# Patient Record
Sex: FEMALE | Race: WHITE | NOT HISPANIC OR LATINO | Employment: UNEMPLOYED | URBAN - NONMETROPOLITAN AREA
[De-identification: names, ages, dates, MRNs, and addresses within clinical notes are randomized per-mention and may not be internally consistent; named-entity substitution may affect disease eponyms.]

---

## 2017-02-11 ENCOUNTER — HOSPITAL ENCOUNTER (EMERGENCY)
Facility: HOSPITAL | Age: 27
Discharge: HOME | End: 2017-02-12
Attending: EMERGENCY MEDICINE | Admitting: EMERGENCY MEDICINE

## 2017-02-11 DIAGNOSIS — B34.9 VIRAL SYNDROME: ICD-10-CM

## 2017-02-11 DIAGNOSIS — R09.82 POST-NASAL DRIP: Primary | ICD-10-CM

## 2017-02-11 DIAGNOSIS — R11.0 NAUSEA: ICD-10-CM

## 2017-02-11 LAB
FLUAV AG NPH QL: NEGATIVE
FLUBV AG NPH QL IA: NEGATIVE
S PYO AG THROAT QL: NEGATIVE

## 2017-02-11 PROCEDURE — 99283 EMERGENCY DEPT VISIT LOW MDM: CPT

## 2017-02-11 PROCEDURE — 87081 CULTURE SCREEN ONLY: CPT | Performed by: EMERGENCY MEDICINE

## 2017-02-11 PROCEDURE — 87880 STREP A ASSAY W/OPTIC: CPT | Performed by: EMERGENCY MEDICINE

## 2017-02-11 PROCEDURE — 94770: CPT

## 2017-02-11 PROCEDURE — 87804 INFLUENZA ASSAY W/OPTIC: CPT | Performed by: EMERGENCY MEDICINE

## 2017-02-12 VITALS
TEMPERATURE: 98.7 F | RESPIRATION RATE: 20 BRPM | HEART RATE: 92 BPM | WEIGHT: 220 LBS | DIASTOLIC BLOOD PRESSURE: 91 MMHG | SYSTOLIC BLOOD PRESSURE: 140 MMHG | OXYGEN SATURATION: 97 % | BODY MASS INDEX: 38.98 KG/M2 | HEIGHT: 63 IN

## 2017-02-12 RX ORDER — AMOXICILLIN 500 MG/1
1000 CAPSULE ORAL 3 TIMES DAILY
Qty: 30 CAPSULE | Refills: 0 | Status: SHIPPED | OUTPATIENT
Start: 2017-02-12 | End: 2017-06-20

## 2017-02-12 RX ORDER — ONDANSETRON 4 MG/1
4 TABLET, ORALLY DISINTEGRATING ORAL EVERY 8 HOURS PRN
Qty: 6 TABLET | Refills: 0 | Status: SHIPPED | OUTPATIENT
Start: 2017-02-12 | End: 2017-06-20

## 2017-02-12 NOTE — ED PROVIDER NOTES
Subjective   HPI Comments: Patient said there has been coughing with upset stomach for about a week denies any fever, no nausea vomiting or diarrhea.    No shortness of breath.    Surgical history history of only left ankle surgery.    Denies any allergies or take any medication in process.    Past medical history denies any other medical problem except for obesity.    Family history of hypertension diabetes cancer and heart disease.     smoke about a pack a day    Patient is a 26 y.o. female presenting with cough.   History provided by:  Patient  Cough   Cough characteristics:  Dry and non-productive  Severity:  Mild  Onset quality:  Gradual  Duration:  7 days  Timing:  Constant  Chronicity:  New  Smoker: yes    Relieved by:  Nothing  Worsened by:  Nothing  Ineffective treatments:  None tried  Associated symptoms: sinus congestion    Associated symptoms: no chest pain, no eye discharge, no fever, no headaches, no rash, no sore throat and no wheezing        Review of Systems   Constitutional: Negative for activity change, appetite change, fatigue and fever.   HENT: Negative for congestion, facial swelling, mouth sores, nosebleeds, sore throat and trouble swallowing.    Eyes: Negative for discharge, redness and itching.   Respiratory: Positive for cough. Negative for apnea and wheezing.    Cardiovascular: Negative for chest pain and palpitations.   Gastrointestinal: Negative for blood in stool and nausea.   Endocrine: Negative for cold intolerance, heat intolerance, polydipsia, polyphagia and polyuria.   Genitourinary: Negative for difficulty urinating, dysuria, flank pain, frequency and hematuria.   Musculoskeletal: Negative for gait problem, joint swelling and neck pain.   Skin: Negative.  Negative for color change, pallor and rash.   Allergic/Immunologic: Negative for environmental allergies.   Neurological: Negative for dizziness, seizures, syncope, speech difficulty, light-headedness, numbness and headaches.  "  Hematological: Negative for adenopathy.   Psychiatric/Behavioral: Negative for agitation, behavioral problems, confusion and sleep disturbance. The patient is not nervous/anxious.        Past Medical History   Diagnosis Date   • Asthma        No Known Allergies    Past Surgical History   Procedure Laterality Date   • Ankle surgery Left 2010       History reviewed. No pertinent family history.    Social History     Social History   • Marital status: Single     Spouse name: N/A   • Number of children: N/A   • Years of education: N/A     Social History Main Topics   • Smoking status: Current Every Day Smoker     Packs/day: 1.00     Types: Cigarettes   • Smokeless tobacco: None   • Alcohol use None   • Drug use: None   • Sexual activity: Not Asked     Other Topics Concern   • None     Social History Narrative   • None           Objective   Physical Exam   Constitutional: She is oriented to person, place, and time. She appears well-developed and well-nourished.   HENT:   Head: Normocephalic and atraumatic.   Nose: Nose normal.   Posterior pharynx with postnasal drip   Eyes: Conjunctivae and EOM are normal. Pupils are equal, round, and reactive to light.   Neck: Normal range of motion. Neck supple.   Cardiovascular: Normal rate, regular rhythm, normal heart sounds and intact distal pulses.    Pulmonary/Chest: Effort normal and breath sounds normal.   Abdominal: Soft. Bowel sounds are normal.   Musculoskeletal: Normal range of motion.   Neurological: She is alert and oriented to person, place, and time.   Skin: Skin is warm and dry.   Psychiatric: She has a normal mood and affect. Her behavior is normal. Judgment and thought content normal.   Nursing note and vitals reviewed.      Procedures         ED Course  ED Course      Blood pressure 140/91, pulse 92, temperature 98.7 °F (37.1 °C), temperature source Temporal Artery , resp. rate 20, height 63\" (160 cm), weight 220 lb (99.8 kg), SpO2 97 %.    Labs Reviewed "   INFLUENZA ANTIGEN - Normal   RAPID STREP A SCREEN - Normal   BETA HEMOLYTIC STREP CULTURE, THROAT        No orders to display                 MDM    Final diagnoses:   Post-nasal drip   Viral syndrome   Nausea            Vipul Phan MD  02/12/17 [id number]

## 2017-02-14 LAB — BACTERIA SPEC AEROBE CULT: NORMAL

## 2017-02-17 ENCOUNTER — HOSPITAL ENCOUNTER (EMERGENCY)
Facility: HOSPITAL | Age: 27
Discharge: LEFT BEFORE DISCHARGE | End: 2017-02-17

## 2017-02-17 VITALS
TEMPERATURE: 98.2 F | DIASTOLIC BLOOD PRESSURE: 90 MMHG | HEART RATE: 102 BPM | HEIGHT: 63 IN | RESPIRATION RATE: 20 BRPM | BODY MASS INDEX: 35.44 KG/M2 | WEIGHT: 200 LBS | OXYGEN SATURATION: 97 % | SYSTOLIC BLOOD PRESSURE: 127 MMHG

## 2017-02-17 PROCEDURE — 99211 OFF/OP EST MAY X REQ PHY/QHP: CPT

## 2017-06-20 ENCOUNTER — OFFICE VISIT (OUTPATIENT)
Dept: OBSTETRICS AND GYNECOLOGY | Facility: CLINIC | Age: 27
End: 2017-06-20

## 2017-06-20 ENCOUNTER — APPOINTMENT (OUTPATIENT)
Dept: LAB | Facility: HOSPITAL | Age: 27
End: 2017-06-20

## 2017-06-20 VITALS
WEIGHT: 227 LBS | BODY MASS INDEX: 40.22 KG/M2 | DIASTOLIC BLOOD PRESSURE: 72 MMHG | SYSTOLIC BLOOD PRESSURE: 128 MMHG | HEIGHT: 63 IN

## 2017-06-20 DIAGNOSIS — Z67.91 BLOOD TYPE, RH NEGATIVE: ICD-10-CM

## 2017-06-20 DIAGNOSIS — N97.9 FEMALE INFERTILITY: Primary | ICD-10-CM

## 2017-06-20 LAB — BLD GP AB SCN SERPL QL: NEGATIVE

## 2017-06-20 PROCEDURE — 99213 OFFICE O/P EST LOW 20 MIN: CPT | Performed by: OBSTETRICS & GYNECOLOGY

## 2017-06-20 PROCEDURE — 86850 RBC ANTIBODY SCREEN: CPT | Performed by: OBSTETRICS & GYNECOLOGY

## 2017-06-20 PROCEDURE — 36415 COLL VENOUS BLD VENIPUNCTURE: CPT | Performed by: OBSTETRICS & GYNECOLOGY

## 2017-06-20 NOTE — PROGRESS NOTES
Subjective   Veena Buck is a 26 y.o. female.     HPI Comments: Patient presents today to discuss infertility.  Reports 2 previous pregnancies which ended in early first trimester miscarriage.  She reports that her blood type is O- and worries about how antibodies may effect her pregnnacies.  We discussed potential workup including antibody screening.  LMP: 3 days ago, prior was in Mar  Last pregnancy was Aug 2016  G1 was as a teenager.  Has been attempting pregnancy for the past 1 1/2 - 2 years.  Discussed irregular periods and weight with potential effects.  Periods were regular for the past 4 years until Mar.      Infertility         The following portions of the patient's history were reviewed and updated as appropriate: allergies, current medications, past family history, past medical history, past social history, past surgical history and problem list.      Review of Systems   All other systems reviewed and are negative.      Objective   Physical Exam   Constitutional: She is oriented to person, place, and time. She appears well-developed and well-nourished. No distress.   HENT:   Head: Normocephalic and atraumatic.   Right Ear: External ear normal.   Left Ear: External ear normal.   Nose: Nose normal.   Mouth/Throat: Oropharynx is clear and moist.   Neurological: She is alert and oriented to person, place, and time.   Skin: She is not diaphoretic.   Psychiatric: She has a normal mood and affect. Her behavior is normal. Judgment and thought content normal.   Vitals reviewed.      Assessment/Plan   Veena was seen today for infertility.    Diagnoses and all orders for this visit:    Female infertility    Blood type, Rh negative  -     Antibody Screen       Antibody screen  Weight loss with 10 lb weight loss goal over 2 mo and f/u 2 mo  Home ovulation kits and keep track of periods

## 2017-06-30 ENCOUNTER — OFFICE VISIT (OUTPATIENT)
Dept: FAMILY MEDICINE CLINIC | Facility: CLINIC | Age: 27
End: 2017-06-30

## 2017-06-30 ENCOUNTER — APPOINTMENT (OUTPATIENT)
Dept: LAB | Facility: HOSPITAL | Age: 27
End: 2017-06-30

## 2017-06-30 VITALS
DIASTOLIC BLOOD PRESSURE: 84 MMHG | WEIGHT: 223.8 LBS | BODY MASS INDEX: 39.65 KG/M2 | SYSTOLIC BLOOD PRESSURE: 132 MMHG | HEIGHT: 63 IN

## 2017-06-30 DIAGNOSIS — N97.9 FEMALE INFERTILITY: Chronic | ICD-10-CM

## 2017-06-30 DIAGNOSIS — Z72.0 TOBACCO USE: Primary | ICD-10-CM

## 2017-06-30 DIAGNOSIS — Z87.09 HISTORY OF ASTHMA: ICD-10-CM

## 2017-06-30 DIAGNOSIS — R73.9 HYPERGLYCEMIA: ICD-10-CM

## 2017-06-30 LAB
ALBUMIN SERPL-MCNC: 4.4 G/DL (ref 3.4–4.8)
ALBUMIN/GLOB SERPL: 1.4 G/DL (ref 1.1–1.8)
ALP SERPL-CCNC: 112 U/L (ref 38–126)
ALT SERPL W P-5'-P-CCNC: 73 U/L (ref 9–52)
ANION GAP SERPL CALCULATED.3IONS-SCNC: 14 MMOL/L (ref 5–15)
AST SERPL-CCNC: 60 U/L (ref 14–36)
BILIRUB SERPL-MCNC: 0.7 MG/DL (ref 0.2–1.3)
BUN BLD-MCNC: 5 MG/DL (ref 7–21)
BUN/CREAT SERPL: 8.3 (ref 7–25)
CALCIUM SPEC-SCNC: 9.8 MG/DL (ref 8.4–10.2)
CHLORIDE SERPL-SCNC: 97 MMOL/L (ref 95–110)
CHOLEST SERPL-MCNC: 231 MG/DL (ref 0–199)
CO2 SERPL-SCNC: 28 MMOL/L (ref 22–31)
CREAT BLD-MCNC: 0.6 MG/DL (ref 0.5–1)
GFR SERPL CREATININE-BSD FRML MDRD: 121 ML/MIN/1.73 (ref 71–165)
GLOBULIN UR ELPH-MCNC: 3.2 GM/DL (ref 2.3–3.5)
GLUCOSE BLD-MCNC: 277 MG/DL (ref 60–100)
HBA1C MFR BLD: 10.35 % (ref 4–5.6)
HDLC SERPL-MCNC: 36 MG/DL (ref 60–200)
LDLC SERPL CALC-MCNC: 117 MG/DL (ref 0–129)
LDLC/HDLC SERPL: 3.26 {RATIO} (ref 0–3.22)
MAGNESIUM SERPL-MCNC: 1.7 MG/DL (ref 1.6–2.3)
POTASSIUM BLD-SCNC: 4.3 MMOL/L (ref 3.5–5.1)
PROT SERPL-MCNC: 7.6 G/DL (ref 6.3–8.6)
SODIUM BLD-SCNC: 139 MMOL/L (ref 137–145)
T4 FREE SERPL-MCNC: 0.98 NG/DL (ref 0.78–2.19)
TRIGL SERPL-MCNC: 388 MG/DL (ref 20–199)
TSH SERPL DL<=0.05 MIU/L-ACNC: 1.68 MIU/ML (ref 0.46–4.68)
VLDLC SERPL-MCNC: 77.6 MG/DL

## 2017-06-30 PROCEDURE — 83525 ASSAY OF INSULIN: CPT | Performed by: FAMILY MEDICINE

## 2017-06-30 PROCEDURE — 83036 HEMOGLOBIN GLYCOSYLATED A1C: CPT | Performed by: FAMILY MEDICINE

## 2017-06-30 PROCEDURE — 83735 ASSAY OF MAGNESIUM: CPT | Performed by: FAMILY MEDICINE

## 2017-06-30 PROCEDURE — 84443 ASSAY THYROID STIM HORMONE: CPT | Performed by: FAMILY MEDICINE

## 2017-06-30 PROCEDURE — 99214 OFFICE O/P EST MOD 30 MIN: CPT | Performed by: FAMILY MEDICINE

## 2017-06-30 PROCEDURE — 80053 COMPREHEN METABOLIC PANEL: CPT | Performed by: FAMILY MEDICINE

## 2017-06-30 PROCEDURE — 36415 COLL VENOUS BLD VENIPUNCTURE: CPT | Performed by: FAMILY MEDICINE

## 2017-06-30 PROCEDURE — 84439 ASSAY OF FREE THYROXINE: CPT | Performed by: FAMILY MEDICINE

## 2017-06-30 PROCEDURE — 80061 LIPID PANEL: CPT | Performed by: FAMILY MEDICINE

## 2017-06-30 RX ORDER — ALBUTEROL SULFATE 90 UG/1
2 AEROSOL, METERED RESPIRATORY (INHALATION) EVERY 4 HOURS PRN
Qty: 1 INHALER | Refills: 2 | Status: SHIPPED | OUTPATIENT
Start: 2017-06-30 | End: 2017-11-15 | Stop reason: SDUPTHER

## 2017-06-30 NOTE — PROGRESS NOTES
Subjective   Veena Buck is a 27 y.o. female.     History of Present Illness Ressie routine evaluation.  Currently having problems with infertility weight.    Probable polycystic ovary syndrome.  Has seen gynecology.  In the past has had an elevated blood sugar 2.  Continues to smoke unfortunately.  Has some family history of metabolic syndrome.  Currently also is in need of an inhaler.  History of asthma but continues to smoke.  History noted.    The following portions of the patient's history were reviewed and updated as appropriate: allergies, current medications, past family history, past medical history, past social history, past surgical history and problem list.    Review of Systems   Constitutional: Negative for activity change, appetite change, fatigue and unexpected weight change.   HENT: Negative for trouble swallowing and voice change.    Eyes: Negative for redness and visual disturbance.   Respiratory: Negative for cough and wheezing.    Cardiovascular: Negative for chest pain and palpitations.   Gastrointestinal: Negative for abdominal pain, constipation, diarrhea, nausea and vomiting.   Genitourinary: Negative for urgency.   Musculoskeletal: Negative for joint swelling.   Neurological: Negative for syncope and headaches.   Hematological: Negative for adenopathy.   Psychiatric/Behavioral: Negative for sleep disturbance.       Objective   Physical Exam   Constitutional: She is oriented to person, place, and time.   HENT:   Head: Normocephalic.   Right Ear: External ear normal.   Left Ear: External ear normal.   Nose: Nose normal.   Mouth/Throat: Oropharynx is clear and moist.   Eyes: EOM are normal. Pupils are equal, round, and reactive to light.   Neck: Normal range of motion. Neck supple. No tracheal deviation present. No thyromegaly present.   Cardiovascular: Normal rate, regular rhythm, normal heart sounds and intact distal pulses.    Pulmonary/Chest: Effort normal and breath sounds normal.  No respiratory distress. She has no wheezes. She has no rales. She exhibits no tenderness.   Abdominal: Soft. Bowel sounds are normal. She exhibits no distension. There is no tenderness.   Musculoskeletal: Normal range of motion.   Neurological: She is alert and oriented to person, place, and time. She has normal reflexes.   Skin: Skin is warm and dry.   Psychiatric: She has a normal mood and affect. Her behavior is normal. Judgment and thought content normal.       Assessment/Plan   Problems Addressed this Visit        Genitourinary    Female infertility (Chronic)    Relevant Orders    Comprehensive Metabolic Panel    Magnesium    T4, Free    TSH    Lipid Panel With LDL / HDL Ratio    Insulin, Random       Other    Tobacco use - Primary (Chronic)    Relevant Medications    albuterol (PROVENTIL HFA;VENTOLIN HFA) 108 (90 BASE) MCG/ACT inhaler    BMI 40.0-44.9, adult (Chronic)    Relevant Orders    Comprehensive Metabolic Panel    Magnesium    T4, Free    TSH    Hemoglobin A1c    Lipid Panel With LDL / HDL Ratio    Insulin, Random    History of asthma    Relevant Medications    albuterol (PROVENTIL HFA;VENTOLIN HFA) 108 (90 BASE) MCG/ACT inhaler      Other Visit Diagnoses     Hyperglycemia        Relevant Orders    Comprehensive Metabolic Panel    Magnesium    Hemoglobin A1c    Lipid Panel With LDL / HDL Ratio    Insulin, Random           lifestyle changes will be due [id number].Recheckasdirectedin7-10days

## 2017-07-03 LAB — INSULIN SERPL-ACNC: 14 UIU/ML

## 2017-07-07 ENCOUNTER — OFFICE VISIT (OUTPATIENT)
Dept: FAMILY MEDICINE CLINIC | Facility: CLINIC | Age: 27
End: 2017-07-07

## 2017-07-07 ENCOUNTER — OFFICE VISIT (OUTPATIENT)
Dept: ENDOCRINOLOGY | Facility: CLINIC | Age: 27
End: 2017-07-07

## 2017-07-07 VITALS
WEIGHT: 221 LBS | HEART RATE: 95 BPM | HEIGHT: 63 IN | SYSTOLIC BLOOD PRESSURE: 122 MMHG | DIASTOLIC BLOOD PRESSURE: 80 MMHG | OXYGEN SATURATION: 96 % | BODY MASS INDEX: 39.16 KG/M2

## 2017-07-07 DIAGNOSIS — E11.9 TYPE 2 DIABETES MELLITUS WITHOUT COMPLICATION, WITHOUT LONG-TERM CURRENT USE OF INSULIN (HCC): Primary | ICD-10-CM

## 2017-07-07 PROCEDURE — 99213 OFFICE O/P EST LOW 20 MIN: CPT | Performed by: FAMILY MEDICINE

## 2017-07-07 RX ORDER — METFORMIN HYDROCHLORIDE 500 MG/1
500 TABLET, EXTENDED RELEASE ORAL
Qty: 90 TABLET | Refills: 3 | Status: SHIPPED | OUTPATIENT
Start: 2017-07-07 | End: 2017-11-15 | Stop reason: SDUPTHER

## 2017-07-07 NOTE — PROGRESS NOTES
Subjective   Veena Buck is a 27 y.o. female.     History of Present Illness reevaluation following studies.  Blood sugar elevated to 77.  Counseled on pathophysiology of type 2 diabetes metabolic syndrome with insulin resistance.  Have  lifestyle measures as before.  Start metformin diabetic educator as outlined.  Counseled flu vaccine in the fall.  Recheck 4 months with hemoglobin A1c prior    The following portions of the patient's history were reviewed and updated as appropriate: allergies, current medications, past family history, past medical history, past social history, past surgical history and problem list.    Review of Systems   Constitutional: Negative for activity change, appetite change, fatigue and unexpected weight change.   HENT: Negative for trouble swallowing and voice change.    Eyes: Negative for redness and visual disturbance.   Respiratory: Negative for cough and wheezing.    Cardiovascular: Negative for chest pain and palpitations.   Gastrointestinal: Negative for abdominal pain, constipation, diarrhea, nausea and vomiting.   Genitourinary: Negative for urgency.   Musculoskeletal: Negative for joint swelling.   Neurological: Negative for syncope and headaches.   Hematological: Negative for adenopathy.   Psychiatric/Behavioral: Negative for sleep disturbance.       Objective   Physical Exam   HENT:   Head: Normocephalic.   Eyes: Pupils are equal, round, and reactive to light.   Neck: Normal range of motion.   Skin: Skin is warm.   Psychiatric: She has a normal mood and affect. Her behavior is normal.       Assessment/Plan   Veena was seen today for follow-up.    Diagnoses and all orders for this visit:    Type 2 diabetes mellitus without complication, without long-term current use of insulin  -     Ambulatory Referral to Diabetic Education  -     metFORMIN ER (GLUCOPHAGE-XR) 500 MG 24 hr tablet; Take 1 tablet by mouth Daily With Breakfast. For blood sugar everyday  -      Hemoglobin A1c; Future    BMI 40.0-44.9, adult       Plan as above

## 2017-11-14 ENCOUNTER — LAB (OUTPATIENT)
Dept: LAB | Facility: HOSPITAL | Age: 27
End: 2017-11-14

## 2017-11-14 DIAGNOSIS — E11.9 TYPE 2 DIABETES MELLITUS WITHOUT COMPLICATION, WITHOUT LONG-TERM CURRENT USE OF INSULIN (HCC): ICD-10-CM

## 2017-11-14 LAB — HBA1C MFR BLD: 8.7 % (ref 4–5.6)

## 2017-11-14 PROCEDURE — 36415 COLL VENOUS BLD VENIPUNCTURE: CPT

## 2017-11-14 PROCEDURE — 83036 HEMOGLOBIN GLYCOSYLATED A1C: CPT | Performed by: FAMILY MEDICINE

## 2017-11-15 ENCOUNTER — OFFICE VISIT (OUTPATIENT)
Dept: FAMILY MEDICINE CLINIC | Facility: CLINIC | Age: 27
End: 2017-11-15

## 2017-11-15 VITALS
WEIGHT: 220.7 LBS | BODY MASS INDEX: 39.11 KG/M2 | DIASTOLIC BLOOD PRESSURE: 78 MMHG | HEIGHT: 63 IN | SYSTOLIC BLOOD PRESSURE: 120 MMHG

## 2017-11-15 DIAGNOSIS — E11.9 TYPE 2 DIABETES MELLITUS WITHOUT COMPLICATION, WITHOUT LONG-TERM CURRENT USE OF INSULIN (HCC): Primary | ICD-10-CM

## 2017-11-15 DIAGNOSIS — Z87.09 HISTORY OF ASTHMA: ICD-10-CM

## 2017-11-15 DIAGNOSIS — Z72.0 TOBACCO USE: Chronic | ICD-10-CM

## 2017-11-15 PROCEDURE — 99214 OFFICE O/P EST MOD 30 MIN: CPT | Performed by: FAMILY MEDICINE

## 2017-11-15 RX ORDER — ALBUTEROL SULFATE 90 UG/1
2 AEROSOL, METERED RESPIRATORY (INHALATION) EVERY 4 HOURS PRN
Qty: 1 INHALER | Refills: 2 | Status: SHIPPED | OUTPATIENT
Start: 2017-11-15 | End: 2018-03-15 | Stop reason: SDUPTHER

## 2017-11-15 RX ORDER — METFORMIN HYDROCHLORIDE EXTENDED-RELEASE TABLETS 1000 MG/1
TABLET, FILM COATED, EXTENDED RELEASE ORAL
Qty: 90 TABLET | Refills: 3 | Status: SHIPPED | OUTPATIENT
Start: 2017-11-15 | End: 2017-12-11

## 2017-11-15 NOTE — PROGRESS NOTES
Subjective   Veena Buck is a 27 y.o. female.     History of Present Illness   reevaluation new-onset diabetes tobacco abuse obesity.  In the interim hemoglobin A1c is dropped from 10-8.7.  Would increase her metformin 2000 mg a day.  Trying to watch diet lost little weight.  Still smoking unfortunately however declines flu vaccine.  Overall metabolically improved.  History noted.    The following portions of the patient's history were reviewed and updated as appropriate: allergies, current medications, past family history, past medical history, past social history, past surgical history and problem list.    Review of Systems   Constitutional: Negative for activity change, appetite change, fatigue and unexpected weight change.   HENT: Negative for trouble swallowing and voice change.    Eyes: Negative for redness and visual disturbance.   Respiratory: Negative for cough and wheezing.    Cardiovascular: Negative for chest pain and palpitations.   Gastrointestinal: Negative for abdominal pain, constipation, diarrhea, nausea and vomiting.   Genitourinary: Negative for urgency.   Musculoskeletal: Negative for joint swelling.   Neurological: Negative for syncope and headaches.   Hematological: Negative for adenopathy.   Psychiatric/Behavioral: Negative for sleep disturbance.       Objective   Physical Exam   Constitutional: She is oriented to person, place, and time. She appears well-developed.   HENT:   Head: Normocephalic.   Nose: Nose normal.   Eyes: Pupils are equal, round, and reactive to light.   Neck: Normal range of motion. No thyromegaly present.   Cardiovascular: Normal rate, regular rhythm, normal heart sounds and intact distal pulses.  Exam reveals no gallop and no friction rub.    No murmur heard.  Pulmonary/Chest: Breath sounds normal.   Abdominal: Soft. She exhibits no distension and no mass. There is no tenderness.   Musculoskeletal: Normal range of motion.   Neurological: She is alert and  oriented to person, place, and time. She has normal reflexes.   Skin: Skin is warm and dry.   Psychiatric: She has a normal mood and affect. Her behavior is normal. Judgment and thought content normal.       Assessment/Plan   Problems Addressed this Visit        Digestive    BMI 40.0-44.9, adult (Chronic)       Endocrine    Type 2 diabetes mellitus without complication, without long-term current use of insulin - Primary    Relevant Medications    metFORMIN ER (FORTAMET) 1000 MG (OSM) 24 hr tablet    Other Relevant Orders    Hemoglobin A1c       Other    Tobacco use (Chronic)    Relevant Medications    albuterol (PROVENTIL HFA;VENTOLIN HFA) 108 (90 Base) MCG/ACT inhaler    History of asthma    Relevant Medications    albuterol (PROVENTIL HFA;VENTOLIN HFA) 108 (90 Base) MCG/ACT inhaler         on stopping smoking.  3-10m      on wt loss measures and programs  Declines flu vaccine just was made above recheck lab 4 months

## 2017-12-11 RX ORDER — METFORMIN HYDROCHLORIDE 750 MG/1
750 TABLET, EXTENDED RELEASE ORAL
Qty: 90 TABLET | Refills: 3 | Status: SHIPPED | OUTPATIENT
Start: 2017-12-11 | End: 2018-03-15 | Stop reason: SDUPTHER

## 2018-03-13 ENCOUNTER — TELEPHONE (OUTPATIENT)
Dept: FAMILY MEDICINE CLINIC | Facility: CLINIC | Age: 28
End: 2018-03-13

## 2018-03-13 NOTE — TELEPHONE ENCOUNTER
Called patient to ask her to go to the lab but there was no answer. ----- Message from Jr Prieto MD sent at 3/13/2018 10:17 AM CDT -----  Regarding: thurs appt  To lab before thurs

## 2018-03-15 ENCOUNTER — APPOINTMENT (OUTPATIENT)
Dept: LAB | Facility: HOSPITAL | Age: 28
End: 2018-03-15

## 2018-03-15 ENCOUNTER — OFFICE VISIT (OUTPATIENT)
Dept: FAMILY MEDICINE CLINIC | Facility: CLINIC | Age: 28
End: 2018-03-15

## 2018-03-15 VITALS
BODY MASS INDEX: 37.49 KG/M2 | DIASTOLIC BLOOD PRESSURE: 82 MMHG | WEIGHT: 211.6 LBS | HEIGHT: 63 IN | SYSTOLIC BLOOD PRESSURE: 130 MMHG

## 2018-03-15 DIAGNOSIS — Z87.09 HISTORY OF ASTHMA: ICD-10-CM

## 2018-03-15 DIAGNOSIS — Z72.0 TOBACCO USE: Chronic | ICD-10-CM

## 2018-03-15 DIAGNOSIS — E11.9 TYPE 2 DIABETES MELLITUS WITHOUT COMPLICATION, WITHOUT LONG-TERM CURRENT USE OF INSULIN (HCC): Primary | ICD-10-CM

## 2018-03-15 LAB — HBA1C MFR BLD: 10.4 % (ref 4–5.6)

## 2018-03-15 PROCEDURE — 83036 HEMOGLOBIN GLYCOSYLATED A1C: CPT | Performed by: FAMILY MEDICINE

## 2018-03-15 PROCEDURE — 36415 COLL VENOUS BLD VENIPUNCTURE: CPT | Performed by: FAMILY MEDICINE

## 2018-03-15 PROCEDURE — 99214 OFFICE O/P EST MOD 30 MIN: CPT | Performed by: FAMILY MEDICINE

## 2018-03-15 RX ORDER — METFORMIN HYDROCHLORIDE 750 MG/1
750 TABLET, EXTENDED RELEASE ORAL
Qty: 90 TABLET | Refills: 3 | Status: SHIPPED | OUTPATIENT
Start: 2018-03-15 | End: 2019-04-03 | Stop reason: SDUPTHER

## 2018-03-15 RX ORDER — ALBUTEROL SULFATE 90 UG/1
2 AEROSOL, METERED RESPIRATORY (INHALATION) EVERY 4 HOURS PRN
Qty: 1 INHALER | Refills: 12 | Status: SHIPPED | OUTPATIENT
Start: 2018-03-15 | End: 2019-04-03 | Stop reason: SDUPTHER

## 2018-03-15 NOTE — PROGRESS NOTES
Subjective   Veena Buck is a 27 y.o. female.     History of Present Illness   follow-up diabetes obesity tobacco abuse.  In interim for some reason confusion about medications try and take it twice daily was taking once a day.  States causes upset stomach counseled on same.  I did not get hemoglobin A1c range for same today.  Counseled once again mainly on lifestyle measures today including diet exercise salt restriction etc.  Also counseled again on stopping smoking.    The following portions of the patient's history were reviewed and updated as appropriate: allergies, current medications, past family history, past medical history, past social history, past surgical history and problem list.    Review of Systems   Constitutional: Negative for activity change, appetite change, fatigue and unexpected weight change.   HENT: Negative for trouble swallowing and voice change.    Eyes: Negative for redness and visual disturbance.   Respiratory: Negative for cough and wheezing.    Cardiovascular: Negative for chest pain and palpitations.   Gastrointestinal: Negative for abdominal pain, constipation, diarrhea, nausea and vomiting.   Genitourinary: Negative for urgency.   Musculoskeletal: Negative for joint swelling.   Neurological: Negative for syncope and headaches.   Hematological: Negative for adenopathy.   Psychiatric/Behavioral: Negative for sleep disturbance.       Objective   Physical Exam   Constitutional: She is oriented to person, place, and time. She appears well-developed.   HENT:   Head: Normocephalic.   Nose: Nose normal.   Eyes: Pupils are equal, round, and reactive to light.   Neck: Normal range of motion. No thyromegaly present.   Cardiovascular: Normal rate, regular rhythm, normal heart sounds and intact distal pulses.  Exam reveals no gallop and no friction rub.    No murmur heard.  Pulmonary/Chest: Breath sounds normal.   Abdominal: Soft. She exhibits no distension and no mass. There is no  tenderness.   Musculoskeletal: Normal range of motion.   Neurological: She is alert and oriented to person, place, and time. She has normal reflexes.   Skin: Skin is warm and dry.   Psychiatric: She has a normal mood and affect.       Assessment/Plan   Veena was seen today for diabetes.    Diagnoses and all orders for this visit:    Type 2 diabetes mellitus without complication, without long-term current use of insulin  -     Hemoglobin A1c  -     metFORMIN ER (GLUCOPHAGE-XR) 750 MG 24 hr tablet; Take 1 tablet by mouth Daily With Breakfast.    BMI 40.0-44.9, adult    History of asthma  -     albuterol (PROVENTIL HFA;VENTOLIN HFA) 108 (90 Base) MCG/ACT inhaler; Inhale 2 puffs Every 4 (Four) Hours As Needed for Wheezing.    Tobacco use  -     albuterol (PROVENTIL HFA;VENTOLIN HFA) 108 (90 Base) MCG/ACT inhaler; Inhale 2 puffs Every 4 (Four) Hours As Needed for Wheezing.    Body mass index is 37.48 kg/m².   on wt loss measures and programs   on stopping smoking.  3-10m     Adjustments recheck 4 months

## 2018-06-26 ENCOUNTER — HOSPITAL ENCOUNTER (EMERGENCY)
Facility: HOSPITAL | Age: 28
Discharge: HOME | End: 2018-06-26
Attending: FAMILY MEDICINE | Admitting: FAMILY MEDICINE

## 2018-06-26 VITALS
WEIGHT: 211 LBS | SYSTOLIC BLOOD PRESSURE: 142 MMHG | DIASTOLIC BLOOD PRESSURE: 90 MMHG | OXYGEN SATURATION: 98 % | TEMPERATURE: 99.1 F | RESPIRATION RATE: 18 BRPM | BODY MASS INDEX: 37.39 KG/M2 | HEIGHT: 63 IN | HEART RATE: 110 BPM

## 2018-06-26 DIAGNOSIS — Z34.91 FIRST TRIMESTER PREGNANCY: Primary | ICD-10-CM

## 2018-06-26 LAB
ALBUMIN SERPL-MCNC: 4.4 G/DL (ref 3.4–4.8)
ALBUMIN/GLOB SERPL: 1.5 G/DL (ref 1.1–1.8)
ALP SERPL-CCNC: 91 U/L (ref 38–126)
ALT SERPL W P-5'-P-CCNC: 58 U/L (ref 9–52)
ANION GAP SERPL CALCULATED.3IONS-SCNC: 11 MMOL/L (ref 5–15)
AST SERPL-CCNC: 42 U/L (ref 14–36)
BACTERIA UR QL AUTO: ABNORMAL /HPF
BASOPHILS # BLD AUTO: 0.02 10*3/MM3 (ref 0–0.2)
BASOPHILS NFR BLD AUTO: 0.2 % (ref 0–2)
BILIRUB SERPL-MCNC: 0.4 MG/DL (ref 0.2–1.3)
BILIRUB UR QL STRIP: NEGATIVE
BUN BLD-MCNC: 6 MG/DL (ref 7–21)
BUN/CREAT SERPL: 10 (ref 7–25)
CALCIUM SPEC-SCNC: 9.9 MG/DL (ref 8.4–10.2)
CHLORIDE SERPL-SCNC: 97 MMOL/L (ref 95–110)
CLARITY UR: ABNORMAL
CO2 SERPL-SCNC: 28 MMOL/L (ref 22–31)
COLOR UR: YELLOW
CREAT BLD-MCNC: 0.6 MG/DL (ref 0.5–1)
DEPRECATED RDW RBC AUTO: 39.2 FL (ref 36.4–46.3)
EOSINOPHIL # BLD AUTO: 0.2 10*3/MM3 (ref 0–0.7)
EOSINOPHIL NFR BLD AUTO: 1.9 % (ref 0–7)
ERYTHROCYTE [DISTWIDTH] IN BLOOD BY AUTOMATED COUNT: 12.6 % (ref 11.5–14.5)
GFR SERPL CREATININE-BSD FRML MDRD: 120 ML/MIN/1.73 (ref 71–165)
GLOBULIN UR ELPH-MCNC: 3 GM/DL (ref 2.3–3.5)
GLUCOSE BLD-MCNC: 309 MG/DL (ref 60–100)
GLUCOSE UR STRIP-MCNC: ABNORMAL MG/DL
HCG INTACT+B SERPL-ACNC: 296.18 MIU/ML
HCG SERPL QL: POSITIVE
HCT VFR BLD AUTO: 42.7 % (ref 35–45)
HGB BLD-MCNC: 15 G/DL (ref 12–15.5)
HGB UR QL STRIP.AUTO: ABNORMAL
HYALINE CASTS UR QL AUTO: ABNORMAL /LPF
IMM GRANULOCYTES # BLD: 0.03 10*3/MM3 (ref 0–0.02)
IMM GRANULOCYTES NFR BLD: 0.3 % (ref 0–0.5)
KETONES UR QL STRIP: ABNORMAL
LEUKOCYTE ESTERASE UR QL STRIP.AUTO: ABNORMAL
LYMPHOCYTES # BLD AUTO: 2.78 10*3/MM3 (ref 0.6–4.2)
LYMPHOCYTES NFR BLD AUTO: 25.9 % (ref 10–50)
MCH RBC QN AUTO: 30.2 PG (ref 26.5–34)
MCHC RBC AUTO-ENTMCNC: 35.1 G/DL (ref 31.4–36)
MCV RBC AUTO: 85.9 FL (ref 80–98)
MONOCYTES # BLD AUTO: 0.37 10*3/MM3 (ref 0–0.9)
MONOCYTES NFR BLD AUTO: 3.4 % (ref 0–12)
NEUTROPHILS # BLD AUTO: 7.34 10*3/MM3 (ref 2–8.6)
NEUTROPHILS NFR BLD AUTO: 68.3 % (ref 37–80)
NITRITE UR QL STRIP: NEGATIVE
PH UR STRIP.AUTO: 5.5 [PH] (ref 5–9)
PLATELET # BLD AUTO: 200 10*3/MM3 (ref 150–450)
PMV BLD AUTO: 11 FL (ref 8–12)
POTASSIUM BLD-SCNC: 4 MMOL/L (ref 3.5–5.1)
PROT SERPL-MCNC: 7.4 G/DL (ref 6.3–8.6)
PROT UR QL STRIP: NEGATIVE
RBC # BLD AUTO: 4.97 10*6/MM3 (ref 3.77–5.16)
RBC # UR: ABNORMAL /HPF
REF LAB TEST METHOD: ABNORMAL
SODIUM BLD-SCNC: 136 MMOL/L (ref 137–145)
SP GR UR STRIP: 1.01 (ref 1–1.03)
SQUAMOUS #/AREA URNS HPF: ABNORMAL /HPF
UROBILINOGEN UR QL STRIP: ABNORMAL
WBC NRBC COR # BLD: 10.74 10*3/MM3 (ref 3.2–9.8)
WBC UR QL AUTO: ABNORMAL /HPF
WHOLE BLOOD HOLD SPECIMEN: NORMAL
YEAST URNS QL MICRO: ABNORMAL /HPF

## 2018-06-26 PROCEDURE — 84702 CHORIONIC GONADOTROPIN TEST: CPT | Performed by: PHYSICIAN ASSISTANT

## 2018-06-26 PROCEDURE — 80053 COMPREHEN METABOLIC PANEL: CPT | Performed by: PHYSICIAN ASSISTANT

## 2018-06-26 PROCEDURE — 99284 EMERGENCY DEPT VISIT MOD MDM: CPT

## 2018-06-26 PROCEDURE — 81001 URINALYSIS AUTO W/SCOPE: CPT | Performed by: FAMILY MEDICINE

## 2018-06-26 PROCEDURE — 85025 COMPLETE CBC W/AUTO DIFF WBC: CPT | Performed by: PHYSICIAN ASSISTANT

## 2018-06-26 PROCEDURE — 84703 CHORIONIC GONADOTROPIN ASSAY: CPT | Performed by: PHYSICIAN ASSISTANT

## 2018-06-26 RX ORDER — SODIUM CHLORIDE 0.9 % (FLUSH) 0.9 %
10 SYRINGE (ML) INJECTION AS NEEDED
Status: DISCONTINUED | OUTPATIENT
Start: 2018-06-26 | End: 2018-06-26 | Stop reason: HOSPADM

## 2018-06-26 RX ORDER — PROMETHAZINE HYDROCHLORIDE 25 MG/ML
12.5 INJECTION, SOLUTION INTRAMUSCULAR; INTRAVENOUS ONCE
Status: DISCONTINUED | OUTPATIENT
Start: 2018-06-26 | End: 2018-06-26 | Stop reason: HOSPADM

## 2018-06-26 RX ORDER — ACETAMINOPHEN 500 MG
1000 TABLET ORAL ONCE
Status: COMPLETED | OUTPATIENT
Start: 2018-06-26 | End: 2018-06-26

## 2018-06-26 RX ORDER — PROMETHAZINE HYDROCHLORIDE 25 MG/1
25 TABLET ORAL EVERY 6 HOURS PRN
Qty: 20 TABLET | Refills: 0 | Status: SHIPPED | OUTPATIENT
Start: 2018-06-26 | End: 2018-10-05

## 2018-06-26 RX ADMIN — ACETAMINOPHEN 1000 MG: 500 TABLET ORAL at 16:47

## 2018-07-05 ENCOUNTER — TRANSCRIBE ORDERS (OUTPATIENT)
Dept: LAB | Facility: HOSPITAL | Age: 28
End: 2018-07-05

## 2018-07-05 ENCOUNTER — LAB (OUTPATIENT)
Dept: LAB | Facility: HOSPITAL | Age: 28
End: 2018-07-05

## 2018-07-05 ENCOUNTER — APPOINTMENT (OUTPATIENT)
Dept: LAB | Facility: HOSPITAL | Age: 28
End: 2018-07-05

## 2018-07-05 DIAGNOSIS — Z34.90 PREGNANCY, UNSPECIFIED GESTATIONAL AGE: ICD-10-CM

## 2018-07-05 DIAGNOSIS — Z34.00 SUPERVISION OF NORMAL FIRST PREGNANCY, ANTEPARTUM: ICD-10-CM

## 2018-07-05 DIAGNOSIS — Z34.00 SUPERVISION OF NORMAL FIRST PREGNANCY, ANTEPARTUM: Primary | ICD-10-CM

## 2018-07-05 DIAGNOSIS — E11.9 TYPE 2 DIABETES MELLITUS WITHOUT COMPLICATION, WITHOUT LONG-TERM CURRENT USE OF INSULIN (HCC): ICD-10-CM

## 2018-07-05 LAB
ABO GROUP BLD: NORMAL
AMPHET+METHAMPHET UR QL: NEGATIVE
BARBITURATES UR QL SCN: NEGATIVE
BASOPHILS # BLD AUTO: 0.02 10*3/MM3 (ref 0–0.2)
BASOPHILS NFR BLD AUTO: 0.2 % (ref 0–2)
BENZODIAZ UR QL SCN: NEGATIVE
BILIRUB UR QL STRIP: NEGATIVE
BLD GP AB SCN SERPL QL: NEGATIVE
CANNABINOIDS SERPL QL: NEGATIVE
CLARITY UR: CLEAR
COCAINE UR QL: NEGATIVE
COLOR UR: YELLOW
DEPRECATED RDW RBC AUTO: 41.4 FL (ref 36.4–46.3)
EOSINOPHIL # BLD AUTO: 0.28 10*3/MM3 (ref 0–0.7)
EOSINOPHIL NFR BLD AUTO: 2.7 % (ref 0–7)
ERYTHROCYTE [DISTWIDTH] IN BLOOD BY AUTOMATED COUNT: 13.2 % (ref 11.5–14.5)
GLUCOSE UR STRIP-MCNC: ABNORMAL MG/DL
HBA1C MFR BLD: 8.9 % (ref 4–5.6)
HCG INTACT+B SERPL-ACNC: 5 MIU/ML
HCT VFR BLD AUTO: 46.1 % (ref 35–45)
HGB BLD-MCNC: 15.8 G/DL (ref 12–15.5)
HGB UR QL STRIP.AUTO: NEGATIVE
IMM GRANULOCYTES # BLD: 0.02 10*3/MM3 (ref 0–0.02)
IMM GRANULOCYTES NFR BLD: 0.2 % (ref 0–0.5)
KETONES UR QL STRIP: NEGATIVE
LEUKOCYTE ESTERASE UR QL STRIP.AUTO: ABNORMAL
LYMPHOCYTES # BLD AUTO: 3.06 10*3/MM3 (ref 0.6–4.2)
LYMPHOCYTES NFR BLD AUTO: 29.7 % (ref 10–50)
Lab: NORMAL
MCH RBC QN AUTO: 29.8 PG (ref 26.5–34)
MCHC RBC AUTO-ENTMCNC: 34.3 G/DL (ref 31.4–36)
MCV RBC AUTO: 87 FL (ref 80–98)
METHADONE UR QL SCN: NEGATIVE
MONOCYTES # BLD AUTO: 0.41 10*3/MM3 (ref 0–0.9)
MONOCYTES NFR BLD AUTO: 4 % (ref 0–12)
NEUTROPHILS # BLD AUTO: 6.51 10*3/MM3 (ref 2–8.6)
NEUTROPHILS NFR BLD AUTO: 63.2 % (ref 37–80)
NITRITE UR QL STRIP: NEGATIVE
OPIATES UR QL: NEGATIVE
OXYCODONE UR QL SCN: NEGATIVE
PH UR STRIP.AUTO: <=5 [PH] (ref 5–9)
PLATELET # BLD AUTO: 244 10*3/MM3 (ref 150–450)
PMV BLD AUTO: 11 FL (ref 8–12)
PROT UR QL STRIP: NEGATIVE
RBC # BLD AUTO: 5.3 10*6/MM3 (ref 3.77–5.16)
RH BLD: NEGATIVE
SP GR UR STRIP: 1.02 (ref 1–1.03)
T4 FREE SERPL-MCNC: 0.95 NG/DL (ref 0.78–2.19)
TSH SERPL DL<=0.05 MIU/L-ACNC: 1.84 MIU/ML (ref 0.46–4.68)
UROBILINOGEN UR QL STRIP: ABNORMAL
WBC NRBC COR # BLD: 10.3 10*3/MM3 (ref 3.2–9.8)

## 2018-07-05 PROCEDURE — 86850 RBC ANTIBODY SCREEN: CPT

## 2018-07-05 PROCEDURE — 80307 DRUG TEST PRSMV CHEM ANLYZR: CPT | Performed by: OBSTETRICS & GYNECOLOGY

## 2018-07-05 PROCEDURE — 86762 RUBELLA ANTIBODY: CPT

## 2018-07-05 PROCEDURE — 85025 COMPLETE CBC W/AUTO DIFF WBC: CPT

## 2018-07-05 PROCEDURE — 84439 ASSAY OF FREE THYROXINE: CPT | Performed by: OBSTETRICS & GYNECOLOGY

## 2018-07-05 PROCEDURE — 86803 HEPATITIS C AB TEST: CPT

## 2018-07-05 PROCEDURE — G0432 EIA HIV-1/HIV-2 SCREEN: HCPCS

## 2018-07-05 PROCEDURE — 36415 COLL VENOUS BLD VENIPUNCTURE: CPT

## 2018-07-05 PROCEDURE — 84702 CHORIONIC GONADOTROPIN TEST: CPT | Performed by: OBSTETRICS & GYNECOLOGY

## 2018-07-05 PROCEDURE — 84443 ASSAY THYROID STIM HORMONE: CPT | Performed by: OBSTETRICS & GYNECOLOGY

## 2018-07-05 PROCEDURE — 83036 HEMOGLOBIN GLYCOSYLATED A1C: CPT

## 2018-07-05 PROCEDURE — 87086 URINE CULTURE/COLONY COUNT: CPT | Performed by: OBSTETRICS & GYNECOLOGY

## 2018-07-05 PROCEDURE — 86900 BLOOD TYPING SEROLOGIC ABO: CPT

## 2018-07-05 PROCEDURE — 87340 HEPATITIS B SURFACE AG IA: CPT

## 2018-07-05 PROCEDURE — 81003 URINALYSIS AUTO W/O SCOPE: CPT | Performed by: OBSTETRICS & GYNECOLOGY

## 2018-07-05 PROCEDURE — 86901 BLOOD TYPING SEROLOGIC RH(D): CPT

## 2018-07-06 ENCOUNTER — TELEPHONE (OUTPATIENT)
Dept: FAMILY MEDICINE CLINIC | Facility: CLINIC | Age: 28
End: 2018-07-06

## 2018-07-06 ENCOUNTER — LAB (OUTPATIENT)
Dept: LAB | Facility: HOSPITAL | Age: 28
End: 2018-07-06

## 2018-07-06 DIAGNOSIS — Z34.00 SUPERVISION OF NORMAL FIRST PREGNANCY, ANTEPARTUM: ICD-10-CM

## 2018-07-06 DIAGNOSIS — Z34.90 PREGNANCY, UNSPECIFIED GESTATIONAL AGE: ICD-10-CM

## 2018-07-06 LAB
HBV SURFACE AG SERPL QL IA: NEGATIVE
HCG INTACT+B SERPL-ACNC: 3.21 MIU/ML
HCV AB SER DONR QL: NEGATIVE
HIV1+2 AB SER QL: NEGATIVE
RUBV IGG SER QL: ABNORMAL
RUBV IGG SER-ACNC: 17.5 IU/ML (ref 0–9.9)

## 2018-07-06 PROCEDURE — 84702 CHORIONIC GONADOTROPIN TEST: CPT

## 2018-07-07 LAB — BACTERIA SPEC AEROBE CULT: NORMAL

## 2018-07-08 LAB — RPR SER QL: NORMAL

## 2018-07-17 ENCOUNTER — OFFICE VISIT (OUTPATIENT)
Dept: FAMILY MEDICINE CLINIC | Facility: CLINIC | Age: 28
End: 2018-07-17

## 2018-07-17 VITALS
BODY MASS INDEX: 37.44 KG/M2 | HEIGHT: 63 IN | WEIGHT: 211.3 LBS | SYSTOLIC BLOOD PRESSURE: 132 MMHG | DIASTOLIC BLOOD PRESSURE: 88 MMHG

## 2018-07-17 DIAGNOSIS — E11.9 TYPE 2 DIABETES MELLITUS WITHOUT COMPLICATION, WITHOUT LONG-TERM CURRENT USE OF INSULIN (HCC): Primary | Chronic | ICD-10-CM

## 2018-07-17 DIAGNOSIS — Z72.0 TOBACCO USE: Chronic | ICD-10-CM

## 2018-07-17 PROBLEM — N97.9 FEMALE INFERTILITY: Chronic | Status: RESOLVED | Noted: 2017-06-20 | Resolved: 2018-07-17

## 2018-07-17 PROCEDURE — 99214 OFFICE O/P EST MOD 30 MIN: CPT | Performed by: FAMILY MEDICINE

## 2018-07-17 NOTE — PROGRESS NOTES
Subjective   Veena Buck is a 28 y.o. female.     History of Present Illness   reevaluation type 2 diabetes obesity tobacco abuse.  In interim said a recent miscarriage.  Hemoglobin A1c drawn at that time it dropped to 8.9 from 10.4.  His electively trying to lose weight diet and exercise.  Unfortunately continuing to smoke.  Lab work was within normal limits.    The following portions of the patient's history were reviewed and updated as appropriate: allergies, current medications, past family history, past medical history, past social history, past surgical history and problem list.    Review of Systems   Constitutional: Negative for activity change, appetite change, fatigue and unexpected weight change.   HENT: Negative for trouble swallowing and voice change.    Eyes: Negative for redness and visual disturbance.   Respiratory: Negative for cough and wheezing.    Cardiovascular: Negative for chest pain and palpitations.   Gastrointestinal: Negative for abdominal pain, constipation, diarrhea, nausea and vomiting.   Genitourinary: Negative for urgency.   Musculoskeletal: Negative for joint swelling.   Neurological: Negative for syncope and headaches.   Hematological: Negative for adenopathy.   Psychiatric/Behavioral: Negative for sleep disturbance.       Objective   Physical Exam   Constitutional: She is oriented to person, place, and time. She appears well-developed.   HENT:   Head: Normocephalic.   Nose: Nose normal.   Eyes: Pupils are equal, round, and reactive to light.   Neck: Normal range of motion. No thyromegaly present.   Cardiovascular: Normal rate, regular rhythm, normal heart sounds and intact distal pulses.  Exam reveals no gallop and no friction rub.    No murmur heard.  Pulmonary/Chest: Breath sounds normal.   Abdominal: Soft. She exhibits no distension and no mass. There is no tenderness.   Musculoskeletal: Normal range of motion.   2+ pulses no skin breakdown   Neurological: She is alert  and oriented to person, place, and time. She has normal reflexes.   Skin: Skin is warm and dry.   Psychiatric: She has a normal mood and affect. Her behavior is normal. Judgment and thought content normal.       Assessment/Plan   Veena was seen today for diabetes.    Diagnoses and all orders for this visit:    Type 2 diabetes mellitus without complication, without long-term current use of insulin (CMS/[facility])  -     Hemoglobin A1c; Future    BMI 37.0-37.9, adult    Tobacco use       on wt loss measures and programs   on stopping smoking.  3-10m     Counseled summer hydration more lifestyle changes.  Defer to GYN for other.  Recheck in 4 months.  Hemoglobin A1c not down more than we'll probably have to go with injectables.

## 2018-10-05 ENCOUNTER — OFFICE VISIT (OUTPATIENT)
Dept: FAMILY MEDICINE CLINIC | Facility: CLINIC | Age: 28
End: 2018-10-05

## 2018-10-05 VITALS
DIASTOLIC BLOOD PRESSURE: 78 MMHG | BODY MASS INDEX: 37.44 KG/M2 | HEIGHT: 63 IN | WEIGHT: 211.3 LBS | SYSTOLIC BLOOD PRESSURE: 122 MMHG

## 2018-10-05 DIAGNOSIS — F41.0 PANIC ANXIETY SYNDROME: ICD-10-CM

## 2018-10-05 DIAGNOSIS — F41.1 GAD (GENERALIZED ANXIETY DISORDER): Primary | ICD-10-CM

## 2018-10-05 PROBLEM — Z87.09 HISTORY OF ASTHMA: Status: RESOLVED | Noted: 2017-06-30 | Resolved: 2018-10-05

## 2018-10-05 PROBLEM — Z67.91 BLOOD TYPE, RH NEGATIVE: Status: RESOLVED | Noted: 2017-06-20 | Resolved: 2018-10-05

## 2018-10-05 PROCEDURE — 99213 OFFICE O/P EST LOW 20 MIN: CPT | Performed by: FAMILY MEDICINE

## 2018-10-05 RX ORDER — PAROXETINE 10 MG/1
TABLET, FILM COATED ORAL
Qty: 40 TABLET | Refills: 1 | Status: SHIPPED | OUTPATIENT
Start: 2018-10-05 | End: 2018-10-30

## 2018-10-05 NOTE — PROGRESS NOTES
Subjective   Veena Buck is a 28 y.o. female.     History of Present Illness   requesting evaluation several week history of what states his panic disorder states about noises multiple stimuli create some room employer's and tachycardia panic reaction.  States has a family history of same.  Also some underlying generalized anxiety disorder with lifestyle issues.  We have counseled on the need for both cognitive behavioral therapy along with medication therapy mainly cognitive therapy help.  Continues on medicine for diabetes.  Most recently his had a miscarriage as well.    The following portions of the patient's history were reviewed and updated as appropriate: allergies, current medications, past family history, past medical history, past social history, past surgical history and problem list.    Review of Systems   Constitutional: Negative for activity change, appetite change, fatigue and unexpected weight change.   HENT: Negative for trouble swallowing and voice change.    Eyes: Negative for redness and visual disturbance.   Respiratory: Negative for cough and wheezing.    Cardiovascular: Negative for chest pain and palpitations.   Gastrointestinal: Negative for abdominal pain, constipation, diarrhea, nausea and vomiting.   Genitourinary: Negative for urgency.   Musculoskeletal: Negative for joint swelling.   Neurological: Negative for syncope and headaches.   Hematological: Negative for adenopathy.   Psychiatric/Behavioral: Negative for sleep disturbance. The patient is nervous/anxious.        Objective   Physical Exam   Constitutional: She appears well-developed.   HENT:   Head: Normocephalic.   Eyes: Pupils are equal, round, and reactive to light.   Neck: Normal range of motion.   Cardiovascular: Normal rate.    Pulmonary/Chest: Effort normal.   Abdominal: Soft.   Psychiatric: She has a normal mood and affect. Her speech is normal and behavior is normal. Judgment and thought content normal.        Assessment/Plan   Veena was seen today for panic attacks.    Diagnoses and all orders for this visit:    ARIADNA (generalized anxiety disorder)  -     PARoxetine (PAXIL) 10 MG tablet; 1qd x 10days then 2qd tll seen again for panic disorder    Panic anxiety syndrome  -     PARoxetine (PAXIL) 10 MG tablet; 1qd x 10days then 2qd tll seen again for panic disorder         him behavioral therapy will make arrangements for same on own start low-dose SSRI recheck 3 weeks  disease process and treatment process

## 2018-10-30 ENCOUNTER — OFFICE VISIT (OUTPATIENT)
Dept: FAMILY MEDICINE CLINIC | Facility: CLINIC | Age: 28
End: 2018-10-30

## 2018-10-30 ENCOUNTER — LAB (OUTPATIENT)
Dept: LAB | Facility: HOSPITAL | Age: 28
End: 2018-10-30

## 2018-10-30 VITALS
SYSTOLIC BLOOD PRESSURE: 132 MMHG | DIASTOLIC BLOOD PRESSURE: 80 MMHG | BODY MASS INDEX: 37.56 KG/M2 | HEIGHT: 63 IN | WEIGHT: 212 LBS

## 2018-10-30 DIAGNOSIS — E11.9 TYPE 2 DIABETES MELLITUS WITHOUT COMPLICATION, WITHOUT LONG-TERM CURRENT USE OF INSULIN (HCC): Chronic | ICD-10-CM

## 2018-10-30 DIAGNOSIS — E11.9 TYPE 2 DIABETES MELLITUS WITHOUT COMPLICATION, WITHOUT LONG-TERM CURRENT USE OF INSULIN (HCC): Primary | Chronic | ICD-10-CM

## 2018-10-30 DIAGNOSIS — F41.1 GAD (GENERALIZED ANXIETY DISORDER): Chronic | ICD-10-CM

## 2018-10-30 LAB — HBA1C MFR BLD: 10.3 % (ref 4–5.6)

## 2018-10-30 PROCEDURE — 99213 OFFICE O/P EST LOW 20 MIN: CPT | Performed by: FAMILY MEDICINE

## 2018-10-30 PROCEDURE — 36415 COLL VENOUS BLD VENIPUNCTURE: CPT

## 2018-10-30 PROCEDURE — 83036 HEMOGLOBIN GLYCOSYLATED A1C: CPT

## 2018-10-30 RX ORDER — SERTRALINE HYDROCHLORIDE 25 MG/1
TABLET, FILM COATED ORAL
Qty: 40 TABLET | Refills: 1 | Status: SHIPPED | OUTPATIENT
Start: 2018-10-30 | End: 2018-11-27 | Stop reason: SDUPTHER

## 2018-10-30 NOTE — PROGRESS NOTES
Subjective   Veena Buck is a 28 y.o. female.     History of Present Illness   reevaluation mild panic attack generalized anxiety disorder.  In interim states the Paxil made her jittery but worked up.  We'll switch to Zoloft currently on side effects which are common with Paxil.  Also counseled once again on need for behavioral therapy of again counseled on Lasix ago for same.  Is also time check a hemoglobin A1c.  Previous history noted.  Declines flu vaccine.    The following portions of the patient's history were reviewed and updated as appropriate: allergies, current medications, past family history, past medical history, past social history, past surgical history and problem list.    Review of Systems   Constitutional: Negative for activity change, appetite change, fatigue and unexpected weight change.   HENT: Negative for trouble swallowing and voice change.    Eyes: Negative for redness and visual disturbance.   Respiratory: Negative for cough and wheezing.    Cardiovascular: Negative for chest pain and palpitations.   Gastrointestinal: Negative for abdominal pain, constipation, diarrhea, nausea and vomiting.   Genitourinary: Negative for urgency.   Musculoskeletal: Negative for joint swelling.   Neurological: Negative for syncope and headaches.   Hematological: Negative for adenopathy.   Psychiatric/Behavioral: Negative for sleep disturbance. The patient is nervous/anxious.        Objective   Physical Exam   Constitutional: She appears well-developed.   HENT:   Head: Normocephalic.   Eyes: Pupils are equal, round, and reactive to light.   Neck: Normal range of motion.   Cardiovascular: Normal rate.    Pulmonary/Chest: Effort normal.   Psychiatric: She has a normal mood and affect. Her behavior is normal. Judgment and thought content normal.       Assessment/Plan   Veena was seen today for follow-up.    Diagnoses and all orders for this visit:    Type 2 diabetes mellitus without complication,  without long-term current use of insulin (CMS/HCC)  -     Hemoglobin A1c    ARIADNA (generalized anxiety disorder)  -     sertraline (ZOLOFT) 25 MG tablet; 1qhs x 7 days then 2qhs till seen again       Plan as above recheck 4 weeks

## 2018-11-27 ENCOUNTER — OFFICE VISIT (OUTPATIENT)
Dept: FAMILY MEDICINE CLINIC | Facility: CLINIC | Age: 28
End: 2018-11-27

## 2018-11-27 VITALS
SYSTOLIC BLOOD PRESSURE: 132 MMHG | DIASTOLIC BLOOD PRESSURE: 80 MMHG | BODY MASS INDEX: 37.21 KG/M2 | HEIGHT: 63 IN | WEIGHT: 210 LBS

## 2018-11-27 DIAGNOSIS — F41.0 PANIC ANXIETY SYNDROME: Primary | Chronic | ICD-10-CM

## 2018-11-27 DIAGNOSIS — E11.9 TYPE 2 DIABETES MELLITUS WITHOUT COMPLICATION, WITHOUT LONG-TERM CURRENT USE OF INSULIN (HCC): Chronic | ICD-10-CM

## 2018-11-27 DIAGNOSIS — Z72.0 TOBACCO USE: Chronic | ICD-10-CM

## 2018-11-27 DIAGNOSIS — F41.1 GAD (GENERALIZED ANXIETY DISORDER): Chronic | ICD-10-CM

## 2018-11-27 PROCEDURE — 99214 OFFICE O/P EST MOD 30 MIN: CPT | Performed by: FAMILY MEDICINE

## 2018-11-27 RX ORDER — GLIMEPIRIDE 2 MG/1
2 TABLET ORAL
Qty: 90 TABLET | Refills: 3 | Status: SHIPPED | OUTPATIENT
Start: 2018-11-27 | End: 2019-01-24

## 2018-11-27 NOTE — PROGRESS NOTES
Subjective   Veena Buck is a 28 y.o. female.     History of Present Illness   follow-up new-onset panic disorder uncontrolled diabetes.  Interim states Zoloft 50 mg a day helps with panic anxiety.  Is also seeing a behavioral therapist.  Hemoglobin A1c jumped 10 again.  We have counseled again on need for better control.  Given options of either injectables versus oral.  She wishes start off with oral.  Counseled this does not worth instructed go to injectable.  Also needs reconsultation with nutrition.  History noted.    The following portions of the patient's history were reviewed and updated as appropriate: allergies, current medications, past family history, past medical history, past social history, past surgical history and problem list.    Review of Systems   Constitutional: Negative for activity change, appetite change, fatigue and unexpected weight change.   HENT: Negative for trouble swallowing and voice change.    Eyes: Negative for redness and visual disturbance.   Respiratory: Negative for cough and wheezing.    Cardiovascular: Negative for chest pain and palpitations.   Gastrointestinal: Negative for abdominal pain, constipation, diarrhea, nausea and vomiting.   Genitourinary: Negative for urgency.   Musculoskeletal: Negative for joint swelling.   Neurological: Negative for syncope and headaches.   Hematological: Negative for adenopathy.   Psychiatric/Behavioral: Negative for sleep disturbance.       Objective   Physical Exam   Constitutional: She appears well-developed.   HENT:   Head: Normocephalic.   Eyes: Pupils are equal, round, and reactive to light.   Neck: Normal range of motion.   Cardiovascular: Normal rate.   Pulmonary/Chest: Effort normal.   Abdominal: Soft.   Psychiatric: She has a normal mood and affect. Her behavior is normal. Thought content normal.       Assessment/Plan   Veena was seen today for follow-up.    Diagnoses and all orders for this visit:    Panic anxiety  syndrome    Tobacco use    Type 2 diabetes mellitus without complication, without long-term current use of insulin (CMS/[facility])  -     glimepiride (AMARYL) 2 MG tablet; Take 1 tablet by mouth Every Morning Before Breakfast.  -     Ambulatory Referral to Nutrition Services  -     Hemoglobin A1c; Future    ARIADNA (generalized anxiety disorder)  -     sertraline (ZOLOFT) 50 MG tablet; 1qd      Medicines adjusted as above.  Continue behavioral therapy.  Reconsultation with nutrition services.  Recheck again 4 months with hemoglobin A1c prior.  Again counseled again on tobacco use cessation

## 2019-01-24 ENCOUNTER — OFFICE VISIT (OUTPATIENT)
Dept: FAMILY MEDICINE CLINIC | Facility: CLINIC | Age: 29
End: 2019-01-24

## 2019-01-24 VITALS
SYSTOLIC BLOOD PRESSURE: 122 MMHG | BODY MASS INDEX: 38.62 KG/M2 | DIASTOLIC BLOOD PRESSURE: 70 MMHG | WEIGHT: 218 LBS | HEIGHT: 63 IN

## 2019-01-24 DIAGNOSIS — E11.9 TYPE 2 DIABETES MELLITUS WITHOUT COMPLICATION, WITHOUT LONG-TERM CURRENT USE OF INSULIN (HCC): Primary | ICD-10-CM

## 2019-01-24 DIAGNOSIS — F41.0 PANIC ANXIETY SYNDROME: Chronic | ICD-10-CM

## 2019-01-24 DIAGNOSIS — Z3A.01 LESS THAN 8 WEEKS GESTATION OF PREGNANCY: ICD-10-CM

## 2019-01-24 PROCEDURE — 99213 OFFICE O/P EST LOW 20 MIN: CPT | Performed by: FAMILY MEDICINE

## 2019-01-24 RX ORDER — TRAZODONE HYDROCHLORIDE 100 MG/1
50 TABLET ORAL AS NEEDED
Refills: 1 | COMMUNITY
Start: 2019-01-18 | End: 2019-01-24

## 2019-01-28 ENCOUNTER — OFFICE VISIT (OUTPATIENT)
Dept: ENDOCRINOLOGY | Facility: CLINIC | Age: 29
End: 2019-01-28

## 2019-01-28 VITALS
SYSTOLIC BLOOD PRESSURE: 120 MMHG | HEART RATE: 79 BPM | DIASTOLIC BLOOD PRESSURE: 74 MMHG | WEIGHT: 218.5 LBS | BODY MASS INDEX: 38.71 KG/M2 | OXYGEN SATURATION: 98 %

## 2019-01-28 DIAGNOSIS — N95.1 MENOPAUSAL SYNDROME (HOT FLASHES): ICD-10-CM

## 2019-01-28 DIAGNOSIS — E11.9 TYPE 2 DIABETES MELLITUS WITHOUT COMPLICATION, WITHOUT LONG-TERM CURRENT USE OF INSULIN (HCC): Primary | Chronic | ICD-10-CM

## 2019-01-28 DIAGNOSIS — Z72.0 TOBACCO ABUSE DISORDER: ICD-10-CM

## 2019-01-28 LAB — GLUCOSE BLDC GLUCOMTR-MCNC: 268 MG/DL (ref 70–130)

## 2019-01-28 PROCEDURE — 95250 CONT GLUC MNTR PHYS/QHP EQP: CPT | Performed by: INTERNAL MEDICINE

## 2019-01-28 PROCEDURE — 99204 OFFICE O/P NEW MOD 45 MIN: CPT | Performed by: INTERNAL MEDICINE

## 2019-01-28 RX ORDER — BLOOD-GLUCOSE METER
KIT MISCELLANEOUS
Qty: 1 EACH | Refills: 0 | Status: SHIPPED | OUTPATIENT
Start: 2019-01-28 | End: 2019-04-02 | Stop reason: SDUPTHER

## 2019-01-28 RX ORDER — LANCETS 30 GAUGE
EACH MISCELLANEOUS
Qty: 200 EACH | Refills: 11 | Status: SHIPPED | OUTPATIENT
Start: 2019-01-28 | End: 2020-07-30

## 2019-01-28 RX ORDER — INSULIN GLARGINE 100 [IU]/ML
INJECTION, SOLUTION SUBCUTANEOUS
Qty: 12 PEN | Refills: 11 | Status: SHIPPED | OUTPATIENT
Start: 2019-01-28 | End: 2019-10-28

## 2019-01-28 NOTE — PROGRESS NOTES
Veena Buck is a 28 y.o. female seen by diabetes educator 01/28/2019 for insertion of Maryjo diagnostic continuous glucose monitor.     Sensor inserted in office. Instructed patient to wear sensor up to 14 days. Patient is to return to clinic in 2 weeks for sensor removal and results. Instructed patient to remove sensor if he has a CT scan, MRI, or X-ray, or if skin irritation occurs.     Emmy Alvarez, TYLORN, RN  Diabetes Educator

## 2019-01-28 NOTE — PATIENT INSTRUCTIONS
Basaglar  ( lantus - levemir )    Start with 15 units at night    The goal is to wake up with a glucose of 80 to 100    If you wake up less than 80 - back off by 2 units    If you wake up more than 100 for 3 days and the glucose is not dropping more than 40 points overnight - then increase by 2 units    Example    If your bedtime reading is 130 ---- you wake up 105 for 3 dasy -- you can increase the dose by 2 units because  A. You woke up higher than 100  B. You didn't drop more than 40 points overnight     -----------    Example    If your bedtime is 180 -- you wake up 120    You can't increase because you are dropping more than 40   =====================================================================    Mealtime Insulin ( admelog - novolog - humalog - apidra )    For every 15 grams that you eat you take 2 units of novolog     The goal is to be less than 120 , 2 hours post meals     If this is not enough then you may want to increase to 2.5 units per 15 grams     --    This is addition to a sliding scale     120-160 : 2 units  161-200 : 4units  201-240 : 6units  Above 240 : 8units    Example    You are about to eat 60 grams of carbohdyrate and the glucose before the meal is 220     For the 60 grams -- 8 units   For the 220 glucose -- 6units    Total 14 units

## 2019-01-28 NOTE — PROGRESS NOTES
" Veena Buck is a 28 y.o. female who presents for  evaluation of   Chief Complaint   Patient presents with   • Diabetes       Referring provider     Primary Care Provider    Jr Prieto MD    Duration Dx approx at age 26 years old    Timing - Diabetes is Constant    Quality -  well controlled    Severity -  moderate    Complications - none    Current symptoms/problems  increase appetite, polydipsia and polyuria     Alleviating Factors: Compliance      Aggravating Factors :  None    Side Effects  none    Current diet  in general, a \"healthy\" diet      Current exercise walking    Current monitoring regimen: home blood tests - checking 2 x daily   Home blood sugar records: 100s    Hypoglycemia none    Past Medical History:   Diagnosis Date   • Asthma    • Diabetes mellitus (CMS/[facility])      No family history on file.  Social History     Tobacco Use   • Smoking status: Current Every Day Smoker     Packs/day: 1.00     Types: Cigarettes   • Tobacco comment: 1*800*quit*now   Substance Use Topics   • Alcohol use: No   • Drug use: No         Current Outpatient Medications:   •  albuterol (PROVENTIL HFA;VENTOLIN HFA) 108 (90 Base) MCG/ACT inhaler, Inhale 2 puffs Every 4 (Four) Hours As Needed for Wheezing., Disp: 1 inhaler, Rfl: 12  •  FLUoxetine HCl (PROZAC PO), Take 20 mg by mouth Daily., Disp: , Rfl:   •  metFORMIN ER (GLUCOPHAGE-XR) 750 MG 24 hr tablet, Take 1 tablet by mouth Daily With Breakfast., Disp: 90 tablet, Rfl: 3  •  Insulin Glargine (BASAGLAR KWIKPEN) 100 UNIT/ML injection pen, Start 15 units qhs but may up titrate up to 40 units qhs, Disp: 12 pen, Rfl: 11  •  Insulin Lispro (ADMELOG SOLOSTAR) 100 UNIT/ML solution pen-injector, Up to 20 units with meals, Disp: 5 pen, Rfl: 11  •  Insulin Pen Needle (B-D UF III MINI PEN NEEDLES) 31G X 5 MM misc, Use 4 times daily  , ICD10 code is E11.9, Disp: 120 each, Rfl: 11    Review of Systems    Review of Systems   Constitutional: Negative for activity change, " appetite change, chills, diaphoresis, fatigue, fever and unexpected weight change.   HENT: Negative for congestion, dental problem, drooling, ear discharge, ear pain, facial swelling, mouth sores, postnasal drip, rhinorrhea, sinus pressure, sore throat, tinnitus, trouble swallowing and voice change.    Eyes: Negative for photophobia, pain, discharge, redness, itching and visual disturbance.   Respiratory: Negative for apnea, cough, choking, chest tightness, shortness of breath, wheezing and stridor.    Cardiovascular: Negative for chest pain, palpitations and leg swelling.   Gastrointestinal: Negative for abdominal distention, abdominal pain, constipation, diarrhea, nausea and vomiting.   Endocrine: Negative for cold intolerance, heat intolerance, polydipsia, polyphagia and polyuria.   Genitourinary: Negative for decreased urine volume, difficulty urinating, dysuria, flank pain, frequency, hematuria and urgency.   Musculoskeletal: Negative for arthralgias, back pain, gait problem, joint swelling, myalgias, neck pain and neck stiffness.   Skin: Negative for color change, pallor, rash and wound.   Allergic/Immunologic: Negative for immunocompromised state.   Neurological: Negative for dizziness, tremors, seizures, syncope, facial asymmetry, speech difficulty, weakness, light-headedness, numbness and headaches.   Hematological: Negative for adenopathy.   Psychiatric/Behavioral: Negative for agitation, behavioral problems, confusion, decreased concentration, dysphoric mood, hallucinations, self-injury, sleep disturbance and suicidal ideas. The patient is not nervous/anxious and is not hyperactive.         Objective:   /74   Pulse 79   Wt 99.1 kg (218 lb 8 oz)   SpO2 98%   BMI 38.71 kg/m²     Physical Exam   Constitutional: She is oriented to person, place, and time. She appears well-developed.   HENT:   Head: Normocephalic.   Right Ear: External ear normal.   Left Ear: External ear normal.   Nose: Nose normal.    Eyes: Conjunctivae and EOM are normal. No scleral icterus.   Neck: Normal range of motion. Neck supple. No tracheal deviation present. No thyromegaly present.   Cardiovascular: Normal rate, regular rhythm, normal heart sounds and intact distal pulses. Exam reveals no gallop and no friction rub.   No murmur heard.  Pulmonary/Chest: Effort normal. No stridor. No respiratory distress. She has no rales. She exhibits no tenderness.   Abdominal: Soft. Bowel sounds are normal. She exhibits no distension and no mass. There is no tenderness. There is no rebound and no guarding.   Musculoskeletal: Normal range of motion. She exhibits no tenderness or deformity.   Lymphadenopathy:     She has no cervical adenopathy.   Neurological: She is alert and oriented to person, place, and time. She displays normal reflexes. She exhibits normal muscle tone. Coordination normal.   Skin: No rash noted. No erythema. No pallor.   Psychiatric: She has a normal mood and affect. Her behavior is normal. Judgment and thought content normal.       Lab Review    Results for orders placed or performed in visit on 01/28/19   POC Glucose   Result Value Ref Range    Glucose 268 (A) 70 - 130 mg/dL         Assessment/Plan       ICD-10-CM ICD-9-CM   1. Type 2 diabetes mellitus without complication, without long-term current use of insulin (CMS/[facility]) E11.9 250.00   2. Menopausal syndrome (hot flashes) N95.1 627.2   3. Tobacco abuse disorder Z72.0 305.1         I reviewed and summarized records from Jr Prieto MD from 2019 and I reviewed / ordered labs.   From review of records :    Pt has type 2 diabetes with hyperglycemia    Glycemic Management:   Lab Results   Component Value Date    HGBA1C 10.3 (H) 10/30/2018    HGBA1C 8.9 (H) 07/05/2018    HGBA1C 10.4 (H) 03/15/2018     Lab Results   Component Value Date    GLUCOSE 309 (H) 06/26/2018    BUN 6 (L) 06/26/2018    CREATININE 0.60 06/26/2018    EGFRIFNONA 120 06/26/2018    BCR 10.0 06/26/2018    K 4.0  06/26/2018    CO2 28.0 06/26/2018    CALCIUM 9.9 06/26/2018    ALBUMIN 4.40 06/26/2018    AST 42 (H) 06/26/2018    ALT 58 (H) 06/26/2018    ANIONGAP 11.0 06/26/2018     Lab Results   Component Value Date    WBC 10.30 (H) 07/05/2018    HGB 15.8 (H) 07/05/2018    HCT 46.1 (H) 07/05/2018    MCV 87.0 07/05/2018     07/05/2018     Metformin 750 mg daily - keep this dose     Start Basaglar 15 units qhs    Novolog 2units per carb       Basaglar  ( lantus - levemir )    Start with 15 units at night    The goal is to wake up with a glucose of 80 to 100    If you wake up less than 80 - back off by 2 units    If you wake up more than 100 for 3 days and the glucose is not dropping more than 40 points overnight - then increase by 2 units    Example    If your bedtime reading is 130 ---- you wake up 105 for 3 dasy -- you can increase the dose by 2 units because  A. You woke up higher than 100  B. You didn't drop more than 40 points overnight     -----------    Example    If your bedtime is 180 -- you wake up 120    You can't increase because you are dropping more than 40   =====================================================================    Mealtime Insulin ( admelog - novolog - humalog - apidra )    For every 15 grams that you eat you take 2 units of novolog     The goal is to be less than 120 , 2 hours post meals     If this is not enough then you may want to increase to 2.5 units per 15 grams     --    This is addition to a sliding scale     120-160 : 2 units  161-200 : 4units  201-240 : 6units  Above 240 : 8units    Example    You are about to eat 60 grams of carbohdyrate and the glucose before the meal is 220     For the 60 grams -- 8 units   For the 220 glucose -- 6units    Total 14 units       ===    Uncontrolled diabetes   Hyperglycemia    Insertion of continuous glucose monitor to define pattern    The continuous glucose monitor that was inserted is a alan glucose monitor            Lab Results   Component  Value Date    TSH 1.840 07/05/2018             Orders Placed This Encounter   Procedures   • POC Glucose         A copy of my note was sent to Jr Prieto MD    Please see my above opinion and suggestions.

## 2019-01-29 ENCOUNTER — TELEPHONE (OUTPATIENT)
Dept: ENDOCRINOLOGY | Facility: CLINIC | Age: 29
End: 2019-01-29

## 2019-01-29 NOTE — TELEPHONE ENCOUNTER
Pt was called in two different kinds of insulin yesterday.  One of them was not covered.  A PA was sent over yesterday.  Can you check on this and let the pt know what to do?  Thank you.

## 2019-01-30 ENCOUNTER — TELEPHONE (OUTPATIENT)
Dept: ENDOCRINOLOGY | Facility: CLINIC | Age: 29
End: 2019-01-30

## 2019-02-01 ENCOUNTER — APPOINTMENT (OUTPATIENT)
Dept: CT IMAGING | Facility: HOSPITAL | Age: 29
End: 2019-02-01

## 2019-02-01 ENCOUNTER — HOSPITAL ENCOUNTER (EMERGENCY)
Facility: HOSPITAL | Age: 29
Discharge: HOME | End: 2019-02-01
Attending: FAMILY MEDICINE | Admitting: FAMILY MEDICINE

## 2019-02-01 ENCOUNTER — DOCUMENTATION (OUTPATIENT)
Dept: NUTRITION | Facility: HOSPITAL | Age: 29
End: 2019-02-01

## 2019-02-01 VITALS
WEIGHT: 218 LBS | BODY MASS INDEX: 38.62 KG/M2 | RESPIRATION RATE: 16 BRPM | OXYGEN SATURATION: 98 % | HEART RATE: 92 BPM | DIASTOLIC BLOOD PRESSURE: 66 MMHG | TEMPERATURE: 98.5 F | HEIGHT: 63 IN | SYSTOLIC BLOOD PRESSURE: 143 MMHG
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
## 2019-02-01 DIAGNOSIS — S16.1XXA STRAIN OF NECK MUSCLE, INITIAL ENCOUNTER: ICD-10-CM

## 2019-02-01 PROCEDURE — 99284 EMERGENCY DEPT VISIT MOD MDM: CPT

## 2019-02-01 PROCEDURE — 72125 CT NECK SPINE W/O DYE: CPT

## 2019-02-01 PROCEDURE — 70450 CT HEAD/BRAIN W/O DYE: CPT

## 2019-02-01 RX ORDER — SERTRALINE HYDROCHLORIDE 25 MG/1
25 TABLET, FILM COATED ORAL DAILY
COMMUNITY
End: 2019-02-01

## 2019-02-01 RX ORDER — HYDROCODONE BITARTRATE AND ACETAMINOPHEN 5; 325 MG/1; MG/1
1 TABLET ORAL ONCE
Status: COMPLETED | OUTPATIENT
Start: 2019-02-01 | End: 2019-02-01

## 2019-02-01 RX ADMIN — HYDROCODONE BITARTRATE AND ACETAMINOPHEN 1 TABLET: 5; 325 TABLET ORAL at 09:37

## 2019-02-01 NOTE — PROGRESS NOTES
Nutrition Services    Patient Name:  Veena Buck  YOB: 1990  MRN: [id number]  Admit Date:  (Not on file)    Pt was referred by yamila quintanilla MD for dm education. 3 attempts to call pt and spoke with pt today who said she doesn't want to come in to see RDN since she is pregnant and her aic is trending down. Will make provider aware.    Electronically signed by:  Erendira Chávez RD  02/01/19 12:54 PM

## 2019-02-06 PROBLEM — J98.8 VIRAL RESPIRATORY ILLNESS: Status: ACTIVE | Noted: 2019-02-06

## 2019-02-06 PROBLEM — B97.89 VIRAL RESPIRATORY ILLNESS: Status: ACTIVE | Noted: 2019-02-06

## 2019-02-06 PROBLEM — R52 BODY ACHES: Status: ACTIVE | Noted: 2019-02-06
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
## 2019-02-11 ENCOUNTER — OFFICE VISIT (OUTPATIENT)
Dept: ENDOCRINOLOGY | Facility: CLINIC | Age: 29
End: 2019-02-11

## 2019-02-11 VITALS
HEART RATE: 99 BPM | WEIGHT: 223 LBS | DIASTOLIC BLOOD PRESSURE: 78 MMHG | BODY MASS INDEX: 39.51 KG/M2 | HEIGHT: 63 IN | SYSTOLIC BLOOD PRESSURE: 118 MMHG

## 2019-02-11 DIAGNOSIS — E11.9 TYPE 2 DIABETES MELLITUS WITHOUT COMPLICATION, WITHOUT LONG-TERM CURRENT USE OF INSULIN (HCC): Primary | Chronic | ICD-10-CM

## 2019-02-11 DIAGNOSIS — Z3A.01 LESS THAN 8 WEEKS GESTATION OF PREGNANCY: Chronic | ICD-10-CM

## 2019-02-11 PROCEDURE — 95250 CONT GLUC MNTR PHYS/QHP EQP: CPT | Performed by: NURSE PRACTITIONER

## 2019-02-11 PROCEDURE — 99214 OFFICE O/P EST MOD 30 MIN: CPT | Performed by: NURSE PRACTITIONER

## 2019-02-11 NOTE — PROGRESS NOTES
"  Subjective    Veena Buck is a 28 y.o. female. she is here today for follow-up.    History of Present Illness       Primary Care Provider     Jr Prieto MD     Duration Dx approx at age 26 years old    Patient is currently 8 weeks      Timing - Diabetes is Constant     Quality -  well controlled     Severity -  moderate     Complications - none     Current symptoms/problems  increase appetite, polydipsia and polyuria      Alleviating Factors: Compliance       Aggravating Factors :  None     Side Effects  none     Current diet  in general, a \"healthy\" diet       Current exercise walking     Current monitoring regimen: home blood tests - checking 2 x daily   Home blood sugar records: 100s    Maryjo average 144    No low    Improved each day       Hypoglycemia none         The following portions of the patient's history were reviewed and updated as appropriate:   Past Medical History:   Diagnosis Date   • Asthma    • Diabetes mellitus (CMS/[facility])      Past Surgical History:   Procedure Laterality Date   • ANKLE SURGERY Left      History reviewed. No pertinent family history.  OB History      Para Term  AB Living    1              SAB TAB Ectopic Molar Multiple Live Births                       Current Outpatient Medications   Medication Sig Dispense Refill   • albuterol (PROVENTIL HFA;VENTOLIN HFA) 108 (90 Base) MCG/ACT inhaler Inhale 2 puffs Every 4 (Four) Hours As Needed for Wheezing. 1 inhaler 12   • diphenhydrAMINE (BENADRYL) 25 mg capsule Take 1 capsule by mouth Every 6 (Six) Hours As Needed for Allergies. 30 capsule 0   • glucose blood test strip PRN 60 each 5   • glucose monitor monitoring kit Use to test sugar 4 times daily. Whatever meter is covered by insurance. With lancing device 1 each 0   • guaifenesin (ROBITUSSIN) 100 MG/5ML liquid Take 5 mL by mouth Every 4 (Four) Hours As Needed for Cough for up to 10 days. 180 mL 0   • Insulin Glargine (BASAGLAR KWIKPEN) 100 UNIT/ML " injection pen Start 15 units qhs but may up titrate up to 40 units qhs 12 pen 11   • Insulin Pen Needle (B-D UF III MINI PEN NEEDLES) 31G X 5 MM misc Use 4 times daily  , ICD10 code is E11.9 120 each 11   • Lancets misc Use to test sugar 4 times daily to go with meter that is covered by insurance 200 each 11   • metFORMIN ER (GLUCOPHAGE-XR) 750 MG 24 hr tablet Take 1 tablet by mouth Daily With Breakfast. 90 tablet 3   • sertraline (ZOLOFT) 50 MG tablet Take 50 mg by mouth Daily.  1     No current facility-administered medications for this visit.      No Known Allergies  Social History     Socioeconomic History   • Marital status:      Spouse name: Not on file   • Number of children: Not on file   • Years of education: Not on file   • Highest education level: Not on file   Tobacco Use   • Smoking status: Current Every Day Smoker     Packs/day: 1.00     Types: Cigarettes   • Smokeless tobacco: Never Used   • Tobacco comment: 1*800*quit*now   Substance and Sexual Activity   • Alcohol use: No   • Drug use: No   • Sexual activity: Yes     Partners: Male     Birth control/protection: None       Review of Systems  Review of Systems   Constitutional: Negative for activity change, appetite change, diaphoresis and fatigue.   HENT: Negative for facial swelling, sneezing, sore throat, tinnitus, trouble swallowing and voice change.    Eyes: Negative for photophobia, pain, discharge, redness, itching and visual disturbance.   Respiratory: Negative for apnea, cough, choking, chest tightness and shortness of breath.    Cardiovascular: Negative for chest pain, palpitations and leg swelling.   Gastrointestinal: Negative for abdominal distention, abdominal pain, constipation, diarrhea, nausea and vomiting.   Endocrine: Negative for cold intolerance, heat intolerance, polydipsia, polyphagia and polyuria.   Genitourinary: Negative for difficulty urinating, dysuria, frequency, hematuria and urgency.   Musculoskeletal: Negative for  "arthralgias, back pain, gait problem, joint swelling, myalgias, neck pain and neck stiffness.   Skin: Negative for color change, pallor, rash and wound.   Neurological: Negative for dizziness, tremors, weakness, light-headedness, numbness and headaches.   Hematological: Negative for adenopathy. Does not bruise/bleed easily.   Psychiatric/Behavioral: Negative for behavioral problems, confusion and sleep disturbance.        Objective    /78 (BP Location: Right arm, Patient Position: Sitting, Cuff Size: Adult)   Pulse 99   Ht 160 cm (63\")   Wt 101 kg (223 lb)   BMI 39.50 kg/m²   Physical Exam   Constitutional: She is oriented to person, place, and time. She appears well-developed and well-nourished. No distress.   HENT:   Head: Normocephalic and atraumatic.   Right Ear: External ear normal.   Left Ear: External ear normal.   Nose: Nose normal.   Eyes: Conjunctivae and EOM are normal. Pupils are equal, round, and reactive to light.   Neck: Normal range of motion. Neck supple. No tracheal deviation present. No thyromegaly present.   Cardiovascular: Normal rate, regular rhythm and normal heart sounds.   No murmur heard.  Pulmonary/Chest: Effort normal and breath sounds normal. No respiratory distress. She has no wheezes.   Abdominal: Soft. Bowel sounds are normal. There is no tenderness. There is no rebound and no guarding.   Musculoskeletal: Normal range of motion. She exhibits no edema, tenderness or deformity.   Neurological: She is alert and oriented to person, place, and time. No cranial nerve deficit.   Skin: Skin is warm and dry. No rash noted.   Psychiatric: She has a normal mood and affect. Her behavior is normal. Judgment and thought content normal.       Lab Review  Glucose   Date Value   06/26/2018 309 mg/dL (H)   06/30/2017 277 mg/dL (H)   01/08/2016 123 mg/dl (H)     Sodium (mmol/L)   Date Value   06/26/2018 136 (L)   06/30/2017 139   01/08/2016 141     Potassium (mmol/L)   Date Value   06/26/2018 " 4.0   06/30/2017 4.3   01/08/2016 4.4     Chloride (mmol/L)   Date Value   06/26/2018 97   06/30/2017 97   01/08/2016 101     CO2 (mmol/L)   Date Value   06/26/2018 28.0   06/30/2017 28.0   01/08/2016 28     BUN   Date Value   06/26/2018 6 mg/dL (L)   06/30/2017 5 mg/dL (L)   01/08/2016 9 mg/dl     Creatinine   Date Value   06/26/2018 0.60 mg/dL   06/30/2017 0.60 mg/dL   01/08/2016 0.8 mg/dl     Hemoglobin A1C (%)   Date Value   10/30/2018 10.3 (H)   07/05/2018 8.9 (H)   03/15/2018 10.4 (H)     Triglycerides (mg/dL)   Date Value   06/30/2017 388 (H)     LDL Cholesterol  (mg/dL)   Date Value   06/30/2017 117       Assessment/Plan      1. Type 2 diabetes mellitus without complication, without long-term current use of insulin (CMS/[facility])    2. Less than 8 weeks gestation of pregnancy    .    Medications prescribed:  Outpatient Encounter Medications as of 2/11/2019   Medication Sig Dispense Refill   • albuterol (PROVENTIL HFA;VENTOLIN HFA) 108 (90 Base) MCG/ACT inhaler Inhale 2 puffs Every 4 (Four) Hours As Needed for Wheezing. 1 inhaler 12   • diphenhydrAMINE (BENADRYL) 25 mg capsule Take 1 capsule by mouth Every 6 (Six) Hours As Needed for Allergies. 30 capsule 0   • glucose blood test strip PRN 60 each 5   • glucose monitor monitoring kit Use to test sugar 4 times daily. Whatever meter is covered by insurance. With lancing device 1 each 0   • guaifenesin (ROBITUSSIN) 100 MG/5ML liquid Take 5 mL by mouth Every 4 (Four) Hours As Needed for Cough for up to 10 days. 180 mL 0   • Insulin Glargine (BASAGLAR KWIKPEN) 100 UNIT/ML injection pen Start 15 units qhs but may up titrate up to 40 units qhs 12 pen 11   • Insulin Pen Needle (B-D UF III MINI PEN NEEDLES) 31G X 5 MM misc Use 4 times daily  , ICD10 code is E11.9 120 each 11   • Lancets misc Use to test sugar 4 times daily to go with meter that is covered by insurance 200 each 11   • metFORMIN ER (GLUCOPHAGE-XR) 750 MG 24 hr tablet Take 1 tablet by mouth Daily With  Breakfast. 90 tablet 3   • sertraline (ZOLOFT) 50 MG tablet Take 50 mg by mouth Daily.  1     No facility-administered encounter medications on file as of 2/11/2019.        Orders placed during this encounter include:  No orders of the defined types were placed in this encounter.    Pt has type 2 diabetes with hyperglycemia     Glycemic Management:        Metformin 750 mg daily - keep this dose      Start Basaglar 15 units qhs     Novolog 2units per carb         Basaglar  ( lantus - levemir )     Start with 15 units at night     The goal is to wake up with a glucose of 80 to 100     If you wake up less than 80 - back off by 2 units     If you wake up more than 100 for 3 days and the glucose is not dropping more than 40 points overnight - then increase by 2 units     Example     If your bedtime reading is 130 ---- you wake up 105 for 3 dasy -- you can increase the dose by 2 units because  A. You woke up higher than 100  B. You didn't drop more than 40 points overnight      -----------     Example     If your bedtime is 180 -- you wake up 120     You can't increase because you are dropping more than 40   =====================================================================     Mealtime Insulin ( admelog - novolog - humalog - apidra )     For every 15 grams that you eat you take 2 units of novolog      The goal is to be less than 120 , 2 hours post meals      If this is not enough then you may want to increase to 2.5 units per 15 grams      --     This is addition to a sliding scale      120-160 : 2 units  161-200 : 4units  201-240 : 6units  Above 240 : 8units     Example     You are about to eat 60 grams of carbohdyrate and the glucose before the meal is 220      For the 60 grams -- 8 units   For the 220 glucose -- 6units     Total 14 units             Maryjo sensor downloaded and reviewed    Dated from Jan. 28 - Feb. 6, 2019     Average blood glucose 144     No low sugar     This am 113       Am goal is 95    2 hours  postprandial 120 or less    Increase basaglar to 15 units     Keep Humalog at 2 per 15 -- she just started this week       Follow up monthly      ===                          Lab Results   Component Value Date     TSH 1.840 07/05/2018           Preventive care:    Smoking    I advised Veena of the risks of continuing to use tobacco, and I provided her with tobacco cessation educational materials in the After Visit Summary.     During this visit, I spent less than 3  minutes counseling the patient regarding tobacco cessation.      Weight management:    Patient's Body mass index is 39.5 kg/m². BMI is above range.        4. Follow-up: Return in about 4 weeks (around 3/11/2019) for Recheck.

## 2019-02-18 ENCOUNTER — TRANSCRIBE ORDERS (OUTPATIENT)
Dept: LAB | Facility: HOSPITAL | Age: 29
End: 2019-02-18

## 2019-02-18 DIAGNOSIS — Z34.90 PREGNANCY, UNSPECIFIED GESTATIONAL AGE: Primary | ICD-10-CM

## 2019-02-19 ENCOUNTER — LAB (OUTPATIENT)
Dept: LAB | Facility: HOSPITAL | Age: 29
End: 2019-02-19

## 2019-02-19 DIAGNOSIS — Z34.90 PREGNANCY, UNSPECIFIED GESTATIONAL AGE: ICD-10-CM

## 2019-02-19 LAB
ABO GROUP BLD: NORMAL
AMPHET+METHAMPHET UR QL: NEGATIVE
BARBITURATES UR QL SCN: NEGATIVE
BASOPHILS # BLD AUTO: 0.03 10*3/MM3 (ref 0–0.2)
BASOPHILS NFR BLD AUTO: 0.2 % (ref 0–1.5)
BENZODIAZ UR QL SCN: NEGATIVE
BILIRUB UR QL STRIP: NEGATIVE
BLD GP AB SCN SERPL QL: NEGATIVE
CANNABINOIDS SERPL QL: NEGATIVE
CLARITY UR: ABNORMAL
COCAINE UR QL: NEGATIVE
COLOR UR: YELLOW
DEPRECATED RDW RBC AUTO: 39.2 FL (ref 37–54)
EOSINOPHIL # BLD AUTO: 0.42 10*3/MM3 (ref 0–0.4)
EOSINOPHIL NFR BLD AUTO: 3 % (ref 0.3–6.2)
ERYTHROCYTE [DISTWIDTH] IN BLOOD BY AUTOMATED COUNT: 12.5 % (ref 12.3–15.4)
GLUCOSE UR STRIP-MCNC: ABNORMAL MG/DL
HCT VFR BLD AUTO: 40.8 % (ref 34–46.6)
HGB BLD-MCNC: 13.8 G/DL (ref 12–15.9)
HGB UR QL STRIP.AUTO: NEGATIVE
IMM GRANULOCYTES # BLD AUTO: 0.05 10*3/MM3 (ref 0–0.05)
IMM GRANULOCYTES NFR BLD AUTO: 0.4 % (ref 0–0.5)
KETONES UR QL STRIP: ABNORMAL
LEUKOCYTE ESTERASE UR QL STRIP.AUTO: NEGATIVE
LYMPHOCYTES # BLD AUTO: 3.18 10*3/MM3 (ref 0.7–3.1)
LYMPHOCYTES NFR BLD AUTO: 23 % (ref 19.6–45.3)
Lab: NORMAL
MCH RBC QN AUTO: 29.2 PG (ref 26.6–33)
MCHC RBC AUTO-ENTMCNC: 33.8 G/DL (ref 31.5–35.7)
MCV RBC AUTO: 86.3 FL (ref 79–97)
METHADONE UR QL SCN: NEGATIVE
MONOCYTES # BLD AUTO: 0.6 10*3/MM3 (ref 0.1–0.9)
MONOCYTES NFR BLD AUTO: 4.3 % (ref 5–12)
NEUTROPHILS # BLD AUTO: 9.55 10*3/MM3 (ref 1.4–7)
NEUTROPHILS NFR BLD AUTO: 69.1 % (ref 42.7–76)
NITRITE UR QL STRIP: NEGATIVE
NRBC BLD AUTO-RTO: 0 /100 WBC (ref 0–0)
OPIATES UR QL: NEGATIVE
OXYCODONE UR QL SCN: NEGATIVE
PH UR STRIP.AUTO: <=5 [PH] (ref 5–9)
PLATELET # BLD AUTO: 272 10*3/MM3 (ref 140–450)
PMV BLD AUTO: 10.5 FL (ref 6–12)
PROT UR QL STRIP: NEGATIVE
RBC # BLD AUTO: 4.73 10*6/MM3 (ref 3.77–5.28)
RH BLD: NEGATIVE
SP GR UR STRIP: 1.02 (ref 1–1.03)
UROBILINOGEN UR QL STRIP: ABNORMAL
WBC NRBC COR # BLD: 13.83 10*3/MM3 (ref 3.4–10.8)

## 2019-02-19 PROCEDURE — 80307 DRUG TEST PRSMV CHEM ANLYZR: CPT | Performed by: NURSE PRACTITIONER

## 2019-02-19 PROCEDURE — 36415 COLL VENOUS BLD VENIPUNCTURE: CPT | Performed by: NURSE PRACTITIONER

## 2019-02-19 PROCEDURE — 86850 RBC ANTIBODY SCREEN: CPT | Performed by: NURSE PRACTITIONER

## 2019-02-19 PROCEDURE — G0432 EIA HIV-1/HIV-2 SCREEN: HCPCS | Performed by: NURSE PRACTITIONER

## 2019-02-19 PROCEDURE — 86762 RUBELLA ANTIBODY: CPT | Performed by: NURSE PRACTITIONER

## 2019-02-19 PROCEDURE — 87077 CULTURE AEROBIC IDENTIFY: CPT | Performed by: NURSE PRACTITIONER

## 2019-02-19 PROCEDURE — 81003 URINALYSIS AUTO W/O SCOPE: CPT | Performed by: NURSE PRACTITIONER

## 2019-02-19 PROCEDURE — 85025 COMPLETE CBC W/AUTO DIFF WBC: CPT | Performed by: NURSE PRACTITIONER

## 2019-02-19 PROCEDURE — 87086 URINE CULTURE/COLONY COUNT: CPT | Performed by: NURSE PRACTITIONER

## 2019-02-19 PROCEDURE — 86901 BLOOD TYPING SEROLOGIC RH(D): CPT | Performed by: NURSE PRACTITIONER

## 2019-02-19 PROCEDURE — 84443 ASSAY THYROID STIM HORMONE: CPT

## 2019-02-19 PROCEDURE — 86900 BLOOD TYPING SEROLOGIC ABO: CPT | Performed by: NURSE PRACTITIONER

## 2019-02-19 PROCEDURE — 87186 SC STD MICRODIL/AGAR DIL: CPT | Performed by: NURSE PRACTITIONER

## 2019-02-19 PROCEDURE — 87340 HEPATITIS B SURFACE AG IA: CPT | Performed by: NURSE PRACTITIONER

## 2019-02-19 PROCEDURE — 86803 HEPATITIS C AB TEST: CPT | Performed by: NURSE PRACTITIONER

## 2019-02-20 LAB
HBV SURFACE AG SERPL QL IA: NEGATIVE
HCV AB SER DONR QL: NEGATIVE
HIV1+2 AB SER QL: NEGATIVE
RUBV IGG SER QL: ABNORMAL
RUBV IGG SER-ACNC: 14.7 IU/ML (ref 0–9.9)

## 2019-02-21 LAB
BACTERIA SPEC AEROBE CULT: ABNORMAL
RPR SER QL: NORMAL

## 2019-02-25 LAB — TSH SERPL DL<=0.05 MIU/L-ACNC: 5.53 MIU/ML (ref 0.46–4.68)

## 2019-03-01 ENCOUNTER — TRANSCRIBE ORDERS (OUTPATIENT)
Dept: LAB | Facility: HOSPITAL | Age: 29
End: 2019-03-01

## 2019-03-01 DIAGNOSIS — Z34.90 PREGNANCY, UNSPECIFIED GESTATIONAL AGE: Primary | ICD-10-CM

## 2019-03-05 ENCOUNTER — OFFICE VISIT (OUTPATIENT)
Dept: FAMILY MEDICINE CLINIC | Facility: CLINIC | Age: 29
End: 2019-03-05

## 2019-03-05 ENCOUNTER — LAB (OUTPATIENT)
Dept: LAB | Facility: HOSPITAL | Age: 29
End: 2019-03-05

## 2019-03-05 VITALS
HEIGHT: 63 IN | BODY MASS INDEX: 39.09 KG/M2 | SYSTOLIC BLOOD PRESSURE: 120 MMHG | DIASTOLIC BLOOD PRESSURE: 80 MMHG | WEIGHT: 220.6 LBS

## 2019-03-05 DIAGNOSIS — F41.0 PANIC ANXIETY SYNDROME: Primary | Chronic | ICD-10-CM

## 2019-03-05 DIAGNOSIS — Z3A.10 PREGNANCY WITH 10 COMPLETED WEEKS GESTATION: ICD-10-CM

## 2019-03-05 DIAGNOSIS — Z34.90 PREGNANCY, UNSPECIFIED GESTATIONAL AGE: ICD-10-CM

## 2019-03-05 DIAGNOSIS — E11.9 TYPE 2 DIABETES MELLITUS WITHOUT COMPLICATION, WITHOUT LONG-TERM CURRENT USE OF INSULIN (HCC): Chronic | ICD-10-CM

## 2019-03-05 PROBLEM — Z3A.01 LESS THAN 8 WEEKS GESTATION OF PREGNANCY: Chronic | Status: RESOLVED | Noted: 2019-01-24 | Resolved: 2019-03-05

## 2019-03-05 PROBLEM — R52 BODY ACHES: Status: RESOLVED | Noted: 2019-02-06 | Resolved: 2019-03-05

## 2019-03-05 PROBLEM — J98.8 VIRAL RESPIRATORY ILLNESS: Status: RESOLVED | Noted: 2019-02-06 | Resolved: 2019-03-05

## 2019-03-05 PROBLEM — B97.89 VIRAL RESPIRATORY ILLNESS: Status: RESOLVED | Noted: 2019-02-06 | Resolved: 2019-03-05

## 2019-03-05 LAB
T4 SERPL-MCNC: 9.06 MCG/DL (ref 5.5–11)
TSH SERPL DL<=0.05 MIU/L-ACNC: 1.39 MIU/ML (ref 0.46–4.68)

## 2019-03-05 PROCEDURE — 84443 ASSAY THYROID STIM HORMONE: CPT

## 2019-03-05 PROCEDURE — 36415 COLL VENOUS BLD VENIPUNCTURE: CPT

## 2019-03-05 PROCEDURE — 99213 OFFICE O/P EST LOW 20 MIN: CPT | Performed by: FAMILY MEDICINE

## 2019-03-05 PROCEDURE — 84436 ASSAY OF TOTAL THYROXINE: CPT

## 2019-03-05 PROCEDURE — 86376 MICROSOMAL ANTIBODY EACH: CPT

## 2019-03-05 NOTE — PROGRESS NOTES
Subjective   Veena Buck is a 28 y.o. female.     History of Present Illness evaluation pregnancy with diabetes panic disorder.  The interim is been to see her OB/GYN in [place] as well as sees Dr. Mohan Gilbert for her diabetes.  Sugars been doing fairly well.  Is been on Zoloft since per preconception and obstetrician confirms to stay on it for now.  Have counseled following up with her endocrinologist no be we will resume her diabetes type care when she delivers    The following portions of the patient's history were reviewed and updated as appropriate: allergies, current medications, past family history, past medical history, past social history, past surgical history and problem list.    Review of Systems   Constitutional: Negative for activity change, appetite change, fatigue and unexpected weight change.   HENT: Negative for trouble swallowing and voice change.    Eyes: Negative for redness and visual disturbance.   Respiratory: Negative for cough and wheezing.    Cardiovascular: Negative for chest pain and palpitations.   Gastrointestinal: Negative for abdominal pain, constipation, diarrhea, nausea and vomiting.   Genitourinary: Negative for urgency.   Musculoskeletal: Negative for joint swelling.   Neurological: Negative for syncope and headaches.   Hematological: Negative for adenopathy.   Psychiatric/Behavioral: Negative for sleep disturbance.       Objective   Physical Exam   Constitutional: She appears well-developed.   HENT:   Head: Normocephalic.   Eyes: Pupils are equal, round, and reactive to light.   Neck: Normal range of motion.   Cardiovascular: Normal rate.   Psychiatric: She has a normal mood and affect. Her behavior is normal. Thought content normal.       Assessment/Plan   Veena was seen today for follow-up.    Diagnoses and all orders for this visit:    Panic anxiety syndrome    Pregnancy with 10 completed weeks gestation    Type 2 diabetes mellitus without complication,  without long-term current use of insulin (CMS/[facility])       Plan as above

## 2019-03-06 LAB — THYROPEROXIDASE AB SERPL-ACNC: 14 IU/ML (ref 0–34)

## 2019-03-10 ENCOUNTER — HOSPITAL ENCOUNTER (EMERGENCY)
Facility: HOSPITAL | Age: 29
Discharge: HOME | End: 2019-03-10
Attending: FAMILY MEDICINE | Admitting: FAMILY MEDICINE

## 2019-03-10 VITALS
OXYGEN SATURATION: 98 % | DIASTOLIC BLOOD PRESSURE: 95 MMHG | TEMPERATURE: 97.9 F | WEIGHT: 220 LBS | SYSTOLIC BLOOD PRESSURE: 144 MMHG | HEIGHT: 63 IN | RESPIRATION RATE: 18 BRPM | HEART RATE: 93 BPM | BODY MASS INDEX: 38.98 KG/M2

## 2019-03-10 DIAGNOSIS — O20.0 THREATENED ABORTION: Primary | ICD-10-CM

## 2019-03-10 LAB
BACTERIA UR QL AUTO: ABNORMAL /HPF
BILIRUB UR QL STRIP: ABNORMAL
CLARITY UR: ABNORMAL
COLOR UR: ABNORMAL
GLUCOSE UR STRIP-MCNC: NEGATIVE MG/DL
HCG INTACT+B SERPL-ACNC: ABNORMAL MIU/ML
HGB UR QL STRIP.AUTO: ABNORMAL
HYALINE CASTS UR QL AUTO: ABNORMAL /LPF
KETONES UR QL STRIP: NEGATIVE
LEUKOCYTE ESTERASE UR QL STRIP.AUTO: ABNORMAL
NITRITE UR QL STRIP: NEGATIVE
PH UR STRIP.AUTO: 5.5 [PH] (ref 5–9)
PROT UR QL STRIP: ABNORMAL
RBC # UR: ABNORMAL /HPF
REF LAB TEST METHOD: ABNORMAL
SP GR UR STRIP: 1.02 (ref 1–1.03)
SQUAMOUS #/AREA URNS HPF: ABNORMAL /HPF
UROBILINOGEN UR QL STRIP: ABNORMAL
WBC UR QL AUTO: ABNORMAL /HPF

## 2019-03-10 PROCEDURE — 84702 CHORIONIC GONADOTROPIN TEST: CPT | Performed by: FAMILY MEDICINE

## 2019-03-10 PROCEDURE — 81001 URINALYSIS AUTO W/SCOPE: CPT | Performed by: FAMILY MEDICINE

## 2019-03-10 PROCEDURE — 25010000003 RHO D IMMUNE GLOBULIN 1500 UNITS SOLUTION PREFILLED SYRINGE: Performed by: FAMILY MEDICINE

## 2019-03-10 PROCEDURE — 96372 THER/PROPH/DIAG INJ SC/IM: CPT

## 2019-03-10 PROCEDURE — 99283 EMERGENCY DEPT VISIT LOW MDM: CPT

## 2019-03-10 RX ORDER — LANOLIN ALCOHOL/MO/W.PET/CERES
25 CREAM (GRAM) TOPICAL DAILY
COMMUNITY
End: 2020-07-21

## 2019-03-10 RX ADMIN — RHO(D) IMMUNE GLOBULIN (HUMAN) 300 MCG: 1500 SOLUTION INTRAMUSCULAR at 06:23

## 2019-03-10 NOTE — ED NOTES
Pt informed me that when she went to restroom and is not bleeding any more at this time.     Declan Meyer RN  03/10/19 0604

## 2019-03-10 NOTE — ED PROVIDER NOTES
Subjective   28-year-old  at 10 weeks gestational age presents to the emergency department with complaint of minimal vaginal bleeding.  She states that she came because she was told that she was supposed to get RhoGam with any vaginal bleeding.  She denies abdominal pain.  She states that her last menstrual period was sometime in December.    She states that her OB performed an ultrasound and documented an intrauterine pregnancy with a heart rate of 178.  Repeat ultrasound showed a heartbeat of 170 according to the patient.            Review of Systems   Constitutional: Negative for activity change, appetite change, chills, fatigue, fever and unexpected weight change.   HENT: Negative for nosebleeds, rhinorrhea, sore throat, trouble swallowing and voice change.    Eyes: Negative for photophobia, pain and visual disturbance.   Respiratory: Negative for apnea, cough, chest tightness, shortness of breath, wheezing and stridor.    Cardiovascular: Negative for chest pain, palpitations and leg swelling.   Gastrointestinal: Negative for abdominal distention, abdominal pain, blood in stool, constipation, diarrhea, nausea and vomiting.   Endocrine: Negative for cold intolerance, heat intolerance, polydipsia and polyuria.   Genitourinary: Negative for decreased urine volume, difficulty urinating, dysuria, flank pain, hematuria and urgency.   Musculoskeletal: Negative for arthralgias, myalgias, neck pain and neck stiffness.   Skin: Negative for color change, pallor and rash.   Allergic/Immunologic: Negative for immunocompromised state.   Neurological: Negative for dizziness, seizures, syncope, weakness, light-headedness and numbness.   Hematological: Negative for adenopathy.   Psychiatric/Behavioral: Negative for agitation, confusion, dysphoric mood and suicidal ideas. The patient is not nervous/anxious.        Past Medical History:   Diagnosis Date   • Anxiety    • Asthma    • Diabetes mellitus (CMS/[facility])        No  Known Allergies    Past Surgical History:   Procedure Laterality Date   • ANKLE SURGERY Left 2010       History reviewed. No pertinent family history.    Social History     Socioeconomic History   • Marital status:      Spouse name: Not on file   • Number of children: Not on file   • Years of education: Not on file   • Highest education level: Not on file   Tobacco Use   • Smoking status: Current Every Day Smoker     Packs/day: 1.00     Types: Cigarettes   • Smokeless tobacco: Never Used   • Tobacco comment: 1*800*quit*now   Substance and Sexual Activity   • Alcohol use: No   • Drug use: No   • Sexual activity: Yes     Partners: Male     Birth control/protection: None           Objective   Physical Exam   Constitutional: She is oriented to person, place, and time. She appears well-developed and well-nourished. No distress.   HENT:   Head: Normocephalic and atraumatic.   Right Ear: External ear normal.   Left Ear: External ear normal.   Mouth/Throat: Oropharynx is clear and moist. No oropharyngeal exudate.   Eyes: Conjunctivae and EOM are normal. Pupils are equal, round, and reactive to light. Right eye exhibits no discharge. Left eye exhibits no discharge. No scleral icterus.   Neck: Neck supple. No JVD present. No tracheal deviation present. No thyromegaly present.   Cardiovascular: Normal rate, regular rhythm, normal heart sounds and intact distal pulses. Exam reveals no friction rub.   No murmur heard.  Pulmonary/Chest: Effort normal and breath sounds normal. No stridor. No respiratory distress. She has no wheezes. She has no rales. She exhibits no tenderness.   Abdominal: Soft. Bowel sounds are normal. She exhibits no distension and no mass. There is no tenderness. There is no rebound and no guarding.   Musculoskeletal: She exhibits no edema, tenderness or deformity.   Lymphadenopathy:     She has no cervical adenopathy.   Neurological: She is alert and oriented to person, place, and time. No cranial  nerve deficit. She exhibits normal muscle tone. Coordination normal.   Skin: Skin is warm and dry. Capillary refill takes 2 to 3 seconds. No rash noted. She is not diaphoretic. No erythema.   Psychiatric: She has a normal mood and affect. Her behavior is normal. Judgment and thought content normal.   Nursing note and vitals reviewed.      Procedures           ED Course  ED Course as of Mar 10 0703   Sun Mar 10, 2019   0600 Patient was placed in room 2 and evaluated by me.  Physical exam was unremarkable.  She has previously confirmed intrauterine pregnancy.  There is no abdominal pain.  She has Rh- and given RhoGam in the emergency department.  Quantitative hCG was obtained.  At this point I believe she is stable for discharge and will follow up with her OB as an outpatient.  [CE]      ED Course User Index  [CE] Eder Lambert,           Labs Reviewed   URINALYSIS W/ MICROSCOPIC IF INDICATED (NO CULTURE) - Abnormal; Notable for the following components:       Result Value    Color, UA Red (*)     Appearance, UA Cloudy (*)     Bilirubin, UA Small (1+) (*)     Blood, UA Large (3+) (*)     Protein, UA >=300 mg/dL (3+) (*)     Leuk Esterase, UA Small (1+) (*)     All other components within normal limits   URINALYSIS, MICROSCOPIC ONLY - Abnormal; Notable for the following components:    RBC, UA 21-30 (*)     WBC, UA 6-12 (*)     Bacteria, UA 1+ (*)     Squamous Epithelial Cells, UA 3-5 (*)     All other components within normal limits   HCG, QUANTITATIVE, PREGNANCY     No results found.        MDM      Final diagnoses:   Threatened             Eder Lambert DO  03/10/19 0704

## 2019-03-13 ENCOUNTER — TRANSCRIBE ORDERS (OUTPATIENT)
Dept: LAB | Facility: HOSPITAL | Age: 29
End: 2019-03-13

## 2019-03-13 DIAGNOSIS — Z34.90 PREGNANCY, UNSPECIFIED GESTATIONAL AGE: Primary | ICD-10-CM

## 2019-03-15 ENCOUNTER — OFFICE VISIT (OUTPATIENT)
Dept: ENDOCRINOLOGY | Facility: CLINIC | Age: 29
End: 2019-03-15

## 2019-03-15 VITALS
HEIGHT: 63 IN | BODY MASS INDEX: 39.69 KG/M2 | SYSTOLIC BLOOD PRESSURE: 124 MMHG | WEIGHT: 224 LBS | DIASTOLIC BLOOD PRESSURE: 86 MMHG | HEART RATE: 104 BPM

## 2019-03-15 DIAGNOSIS — E11.9 TYPE 2 DIABETES MELLITUS WITHOUT COMPLICATION, WITHOUT LONG-TERM CURRENT USE OF INSULIN (HCC): Primary | Chronic | ICD-10-CM

## 2019-03-15 DIAGNOSIS — Z72.0 TOBACCO USE: Chronic | ICD-10-CM

## 2019-03-15 DIAGNOSIS — Z3A.12 12 WEEKS GESTATION OF PREGNANCY: ICD-10-CM

## 2019-03-15 LAB — HBA1C MFR BLD: 6.1 %

## 2019-03-15 PROCEDURE — 83036 HEMOGLOBIN GLYCOSYLATED A1C: CPT | Performed by: NURSE PRACTITIONER

## 2019-03-15 PROCEDURE — 99214 OFFICE O/P EST MOD 30 MIN: CPT | Performed by: NURSE PRACTITIONER

## 2019-03-15 NOTE — PROGRESS NOTES
"  Subjective    Veena Buck is a 28 y.o. female. she is here today for follow-up.    History of Present Illness       Primary Care Provider     Jr Prieto MD     Duration Dx approx at age 26 years old     Patient is currently 8 weeks      Timing - Diabetes is Constant     Quality -  well controlled     Severity -  moderate     Complications - none     Current symptoms/problems  increase appetite, polydipsia and polyuria      Alleviating Factors: Compliance       Aggravating Factors :  None     Side Effects  none     Current diet  in general, a \"healthy\" diet       Current exercise walking     Current monitoring regimen: home blood tests - checking 2 x daily       Lab Results   Component Value Date    HGBA1C 6.1 03/15/2019       Home blood sugar records: 100s     This am 109        She has had two readings in the 200s but forgot her shot    Postprandial 134 up to 156      Hypoglycemia none         The following portions of the patient's history were reviewed and updated as appropriate:   Past Medical History:   Diagnosis Date   • Anxiety    • Asthma    • Diabetes mellitus (CMS/[facility])      Past Surgical History:   Procedure Laterality Date   • ANKLE SURGERY Left      No family history on file.  OB History      Para Term  AB Living    1              SAB TAB Ectopic Molar Multiple Live Births                       Current Outpatient Medications   Medication Sig Dispense Refill   • albuterol (PROVENTIL HFA;VENTOLIN HFA) 108 (90 Base) MCG/ACT inhaler Inhale 2 puffs Every 4 (Four) Hours As Needed for Wheezing. 1 inhaler 12   • diphenhydrAMINE (BENADRYL) 25 mg capsule Take 1 capsule by mouth Every 6 (Six) Hours As Needed for Allergies. 30 capsule 0   • doxylamine (UNISOM) 25 MG tablet Take 25 mg by mouth At Night As Needed for Sleep.     • glucose blood test strip PRN 60 each 5   • glucose monitor monitoring kit Use to test sugar 4 times daily. Whatever meter is covered by insurance. With " lancing device 1 each 0   • Insulin Glargine (BASAGLAR KWIKPEN) 100 UNIT/ML injection pen Start 15 units qhs but may up titrate up to 40 units qhs 12 pen 11   • insulin lispro (humaLOG) 100 UNIT/ML injection Inject  under the skin into the appropriate area as directed 3 (Three) Times a Day Before Meals. SLIDING SCALE     • Insulin Pen Needle (B-D UF III MINI PEN NEEDLES) 31G X 5 MM misc Use 4 times daily  , ICD10 code is E11.9 120 each 11   • Lancets misc Use to test sugar 4 times daily to go with meter that is covered by insurance 200 each 11   • metFORMIN ER (GLUCOPHAGE-XR) 750 MG 24 hr tablet Take 1 tablet by mouth Daily With Breakfast. 90 tablet 3   • sertraline (ZOLOFT) 50 MG tablet Take 1 tablet by mouth Daily. 90 tablet 2   • vitamin B-6 (PYRIDOXINE) 50 MG tablet Take 25 mg by mouth Daily.       No current facility-administered medications for this visit.      No Known Allergies  Social History     Socioeconomic History   • Marital status:      Spouse name: Not on file   • Number of children: Not on file   • Years of education: Not on file   • Highest education level: Not on file   Tobacco Use   • Smoking status: Current Every Day Smoker     Packs/day: 1.00     Types: Cigarettes   • Smokeless tobacco: Never Used   • Tobacco comment: 1*800*quit*now   Substance and Sexual Activity   • Alcohol use: No   • Drug use: No   • Sexual activity: Yes     Partners: Male     Birth control/protection: None       Review of Systems  Review of Systems   Constitutional: Negative for activity change, appetite change, diaphoresis and fatigue.   HENT: Negative for facial swelling, sneezing, sore throat, tinnitus, trouble swallowing and voice change.    Eyes: Negative for photophobia, pain, discharge, redness, itching and visual disturbance.   Respiratory: Negative for apnea, cough, choking, chest tightness and shortness of breath.    Cardiovascular: Negative for chest pain, palpitations and leg swelling.  "  Gastrointestinal: Negative for abdominal distention, abdominal pain, constipation, diarrhea, nausea and vomiting.   Endocrine: Negative for cold intolerance, heat intolerance, polydipsia, polyphagia and polyuria.   Genitourinary: Negative for difficulty urinating, dysuria, frequency, hematuria and urgency.   Musculoskeletal: Negative for arthralgias, back pain, gait problem, joint swelling, myalgias, neck pain and neck stiffness.   Skin: Negative for color change, pallor, rash and wound.   Neurological: Negative for dizziness, tremors, weakness, light-headedness, numbness and headaches.   Hematological: Negative for adenopathy. Does not bruise/bleed easily.   Psychiatric/Behavioral: Negative for behavioral problems, confusion and sleep disturbance.        Objective    /86 (BP Location: Right arm, Patient Position: Sitting, Cuff Size: Adult)   Pulse 104   Ht 160 cm (63\")   Wt 102 kg (224 lb)   BMI 39.68 kg/m²   Physical Exam   Constitutional: She is oriented to person, place, and time. She appears well-developed and well-nourished. No distress.   HENT:   Head: Normocephalic and atraumatic.   Right Ear: External ear normal.   Left Ear: External ear normal.   Nose: Nose normal.   Eyes: Conjunctivae and EOM are normal. Pupils are equal, round, and reactive to light.   Neck: Normal range of motion. Neck supple. No tracheal deviation present. No thyromegaly present.   Cardiovascular: Normal rate, regular rhythm and normal heart sounds.   No murmur heard.  Pulmonary/Chest: Effort normal and breath sounds normal. No respiratory distress. She has no wheezes.   Abdominal: Soft. Bowel sounds are normal. There is no tenderness. There is no rebound and no guarding.   Musculoskeletal: Normal range of motion. She exhibits no edema, tenderness or deformity.   Neurological: She is alert and oriented to person, place, and time. No cranial nerve deficit.   Skin: Skin is warm and dry. No rash noted.   Psychiatric: She has a " normal mood and affect. Her behavior is normal. Judgment and thought content normal.       Lab Review  Glucose   Date Value   06/26/2018 309 mg/dL (H)   06/30/2017 277 mg/dL (H)   01/08/2016 123 mg/dl (H)     Sodium (mmol/L)   Date Value   06/26/2018 136 (L)   06/30/2017 139   01/08/2016 141     Potassium (mmol/L)   Date Value   06/26/2018 4.0   06/30/2017 4.3   01/08/2016 4.4     Chloride (mmol/L)   Date Value   06/26/2018 97   06/30/2017 97   01/08/2016 101     CO2 (mmol/L)   Date Value   06/26/2018 28.0   06/30/2017 28.0   01/08/2016 28     BUN   Date Value   06/26/2018 6 mg/dL (L)   06/30/2017 5 mg/dL (L)   01/08/2016 9 mg/dl     Creatinine   Date Value   06/26/2018 0.60 mg/dL   06/30/2017 0.60 mg/dL   01/08/2016 0.8 mg/dl     Hemoglobin A1C (%)   Date Value   03/15/2019 6.1   10/30/2018 10.3 (H)   07/05/2018 8.9 (H)   03/15/2018 10.4 (H)     Triglycerides (mg/dL)   Date Value   06/30/2017 388 (H)     LDL Cholesterol  (mg/dL)   Date Value   06/30/2017 117       Assessment/Plan      1. Type 2 diabetes mellitus without complication, without long-term current use of insulin (CMS/[facility])    2. Tobacco use    3. 12 weeks gestation of pregnancy    .    Medications prescribed:  Outpatient Encounter Medications as of 3/15/2019   Medication Sig Dispense Refill   • albuterol (PROVENTIL HFA;VENTOLIN HFA) 108 (90 Base) MCG/ACT inhaler Inhale 2 puffs Every 4 (Four) Hours As Needed for Wheezing. 1 inhaler 12   • diphenhydrAMINE (BENADRYL) 25 mg capsule Take 1 capsule by mouth Every 6 (Six) Hours As Needed for Allergies. 30 capsule 0   • doxylamine (UNISOM) 25 MG tablet Take 25 mg by mouth At Night As Needed for Sleep.     • glucose blood test strip PRN 60 each 5   • glucose monitor monitoring kit Use to test sugar 4 times daily. Whatever meter is covered by insurance. With lancing device 1 each 0   • Insulin Glargine (BASAGLAR KWIKPEN) 100 UNIT/ML injection pen Start 15 units qhs but may up titrate up to 40 units qhs 12 pen 11    • insulin lispro (humaLOG) 100 UNIT/ML injection Inject  under the skin into the appropriate area as directed 3 (Three) Times a Day Before Meals. SLIDING SCALE     • Insulin Pen Needle (B-D UF III MINI PEN NEEDLES) 31G X 5 MM misc Use 4 times daily  , ICD10 code is E11.9 120 each 11   • Lancets misc Use to test sugar 4 times daily to go with meter that is covered by insurance 200 each 11   • metFORMIN ER (GLUCOPHAGE-XR) 750 MG 24 hr tablet Take 1 tablet by mouth Daily With Breakfast. 90 tablet 3   • sertraline (ZOLOFT) 50 MG tablet Take 1 tablet by mouth Daily. 90 tablet 2   • vitamin B-6 (PYRIDOXINE) 50 MG tablet Take 25 mg by mouth Daily.       No facility-administered encounter medications on file as of 3/15/2019.        Orders placed during this encounter include:  Orders Placed This Encounter   Procedures   • POC Glycosylated Hemoglobin (Hb A1C)     Pt has type 2 diabetes with hyperglycemia     Glycemic Management:        Lab Results   Component Value Date    HGBA1C 6.1 03/15/2019             Basaglar  ( lantus - levemir )     Start with 15 units at night--increase to 18 units      The goal is to wake up with a glucose of 80 to 100     If you wake up less than 80 - back off by 2 units     If you wake up more than 100 for 3 days and the glucose is not dropping more than 40 points overnight - then increase by 2 units     Example     If your bedtime reading is 130 ---- you wake up 105 for 3 dasy -- you can increase the dose by 2 units because  A. You woke up higher than 100  B. You didn't drop more than 40 points overnight      -----------     Example     If your bedtime is 180 -- you wake up 120     You can't increase because you are dropping more than 40   =====================================================================     Mealtime Insulin ( admelog - novolog - humalog - apidra )     For every 15 grams that you eat you take 2 units of novolog      The goal is to be less than 120 , 2 hours post meals       If this is not enough then you may want to increase to 2.5 units per 15 grams      --      This is addition to a sliding scale      120-160 : 2 units  161-200 : 4units  201-240 : 6units  Above 240 : 8units     Example     You are about to eat 60 grams of carbohdyrate and the glucose before the meal is 220      For the 60 grams -- 8 units   For the 220 glucose -- 6units     Total 14 units                  Maryjo sensor downloaded and reviewed     Dated from Jan. 28 - Feb. 6, 2019      Average blood glucose 144      No low sugar      This am 113         Am goal is 95     2 hours postprandial 120 or less     Increase basaglar to 15 units      Keep Humalog at 2 per 15 -- she just started this week         Follow up monthly      ===                              Lab Results   Component Value Date     TSH 1.840 07/05/2018            Preventive care:     Smoking     I advised Veena of the risks of continuing to use tobacco, and I provided her with tobacco cessation educational materials in the After Visit Summary.      During this visit, I spent less than 3  minutes counseling the patient regarding tobacco cessation.        Weight management:     Patient's Body mass index is 39.68 kg/m². BMI is above normal parameters. Recommendations include: educational material.           4. Follow-up: Return in about 4 weeks (around 4/12/2019) for Recheck.

## 2019-03-19 ENCOUNTER — TELEPHONE (OUTPATIENT)
Dept: ENDOCRINOLOGY | Facility: CLINIC | Age: 29
End: 2019-03-19

## 2019-04-02 ENCOUNTER — TELEPHONE (OUTPATIENT)
Dept: FAMILY MEDICINE CLINIC | Facility: CLINIC | Age: 29
End: 2019-04-02

## 2019-04-03 ENCOUNTER — TELEPHONE (OUTPATIENT)
Dept: ENDOCRINOLOGY | Facility: CLINIC | Age: 29
End: 2019-04-03

## 2019-04-03 ENCOUNTER — TELEPHONE (OUTPATIENT)
Dept: FAMILY MEDICINE CLINIC | Facility: CLINIC | Age: 29
End: 2019-04-03

## 2019-04-03 DIAGNOSIS — Z72.0 TOBACCO USE: Chronic | ICD-10-CM

## 2019-04-03 DIAGNOSIS — E11.9 TYPE 2 DIABETES MELLITUS WITHOUT COMPLICATION, WITHOUT LONG-TERM CURRENT USE OF INSULIN (HCC): ICD-10-CM

## 2019-04-03 DIAGNOSIS — Z87.09 HISTORY OF ASTHMA: ICD-10-CM

## 2019-04-03 RX ORDER — BLOOD-GLUCOSE METER
EACH MISCELLANEOUS
Qty: 150 EACH | Refills: 11 | Status: SHIPPED | OUTPATIENT
Start: 2019-04-03 | End: 2019-05-28 | Stop reason: SDUPTHER

## 2019-04-03 RX ORDER — METFORMIN HYDROCHLORIDE 750 MG/1
750 TABLET, EXTENDED RELEASE ORAL
Qty: 90 TABLET | Refills: 3 | Status: SHIPPED | OUTPATIENT
Start: 2019-04-03 | End: 2019-10-28

## 2019-04-03 RX ORDER — ALBUTEROL SULFATE 90 UG/1
2 AEROSOL, METERED RESPIRATORY (INHALATION) EVERY 4 HOURS PRN
Qty: 1 INHALER | Refills: 12 | Status: SHIPPED | OUTPATIENT
Start: 2019-04-03 | End: 2020-07-30 | Stop reason: SDUPTHER

## 2019-04-03 NOTE — TELEPHONE ENCOUNTER
Patient needs refills sent to [facility] for her albuterol (PROVENTIL HFA;VENTOLIN HFA) 108 (90 Base) MCG/ACT inhaler

## 2019-04-30 ENCOUNTER — LAB (OUTPATIENT)
Dept: LAB | Facility: HOSPITAL | Age: 29
End: 2019-04-30

## 2019-04-30 ENCOUNTER — TRANSCRIBE ORDERS (OUTPATIENT)
Dept: LAB | Facility: HOSPITAL | Age: 29
End: 2019-04-30

## 2019-04-30 DIAGNOSIS — Z36.1 NEED FOR MATERNAL SERUM ALPHA-PROTEIN (MSAFP) SCREENING: Primary | ICD-10-CM

## 2019-04-30 DIAGNOSIS — Z36.1 NEED FOR MATERNAL SERUM ALPHA-PROTEIN (MSAFP) SCREENING: ICD-10-CM

## 2019-04-30 PROCEDURE — 36415 COLL VENOUS BLD VENIPUNCTURE: CPT

## 2019-05-03 ENCOUNTER — LAB (OUTPATIENT)
Dept: LAB | Facility: HOSPITAL | Age: 29
End: 2019-05-03

## 2019-05-03 ENCOUNTER — TRANSCRIBE ORDERS (OUTPATIENT)
Dept: LAB | Facility: HOSPITAL | Age: 29
End: 2019-05-03

## 2019-05-03 DIAGNOSIS — Z36.1 SCREENING FOR RAISED ALPHA-FETOPROTEIN LEVELS IN AMNIOTIC FLUID: Primary | ICD-10-CM

## 2019-05-03 DIAGNOSIS — E11.8 DIABETIC COMPLICATION (HCC): Primary | ICD-10-CM

## 2019-05-03 LAB
ALBUMIN SERPL-MCNC: 3.9 G/DL (ref 3.5–5.2)
ALBUMIN/GLOB SERPL: 1.4 G/DL
ALP SERPL-CCNC: 73 U/L (ref 39–117)
ALT SERPL W P-5'-P-CCNC: 14 U/L (ref 1–33)
ANION GAP SERPL CALCULATED.3IONS-SCNC: 11.5 MMOL/L
AST SERPL-CCNC: 13 U/L (ref 1–32)
BASOPHILS # BLD AUTO: 0.03 10*3/MM3 (ref 0–0.2)
BASOPHILS NFR BLD AUTO: 0.2 % (ref 0–1.5)
BILIRUB SERPL-MCNC: 0.2 MG/DL (ref 0.2–1.2)
BUN BLD-MCNC: 5 MG/DL (ref 6–20)
BUN/CREAT SERPL: 9.3 (ref 7–25)
CALCIUM SPEC-SCNC: 9.5 MG/DL (ref 8.6–10.5)
CHLORIDE SERPL-SCNC: 104 MMOL/L (ref 98–107)
CO2 SERPL-SCNC: 20.5 MMOL/L (ref 22–29)
CREAT BLD-MCNC: 0.54 MG/DL (ref 0.57–1)
DEPRECATED RDW RBC AUTO: 42.1 FL (ref 37–54)
EOSINOPHIL # BLD AUTO: 0.22 10*3/MM3 (ref 0–0.4)
EOSINOPHIL NFR BLD AUTO: 1.6 % (ref 0.3–6.2)
ERYTHROCYTE [DISTWIDTH] IN BLOOD BY AUTOMATED COUNT: 13 % (ref 12.3–15.4)
GFR SERPL CREATININE-BSD FRML MDRD: 135 ML/MIN/1.73
GLOBULIN UR ELPH-MCNC: 2.7 GM/DL
GLUCOSE BLD-MCNC: 189 MG/DL (ref 65–99)
HBA1C MFR BLD: 5.7 % (ref 4.8–5.6)
HCT VFR BLD AUTO: 38.2 % (ref 34–46.6)
HGB BLD-MCNC: 12.8 G/DL (ref 12–15.9)
IMM GRANULOCYTES # BLD AUTO: 0.05 10*3/MM3 (ref 0–0.05)
IMM GRANULOCYTES NFR BLD AUTO: 0.4 % (ref 0–0.5)
LDH SERPL-CCNC: 120 U/L (ref 135–214)
LYMPHOCYTES # BLD AUTO: 1.72 10*3/MM3 (ref 0.7–3.1)
LYMPHOCYTES NFR BLD AUTO: 12.7 % (ref 19.6–45.3)
MCH RBC QN AUTO: 29.6 PG (ref 26.6–33)
MCHC RBC AUTO-ENTMCNC: 33.5 G/DL (ref 31.5–35.7)
MCV RBC AUTO: 88.2 FL (ref 79–97)
MONOCYTES # BLD AUTO: 0.42 10*3/MM3 (ref 0.1–0.9)
MONOCYTES NFR BLD AUTO: 3.1 % (ref 5–12)
NEUTROPHILS # BLD AUTO: 11.12 10*3/MM3 (ref 1.7–7)
NEUTROPHILS NFR BLD AUTO: 82 % (ref 42.7–76)
NRBC BLD AUTO-RTO: 0 /100 WBC (ref 0–0.2)
PLATELET # BLD AUTO: 220 10*3/MM3 (ref 140–450)
PMV BLD AUTO: 11.8 FL (ref 6–12)
POTASSIUM BLD-SCNC: 3.9 MMOL/L (ref 3.5–5.2)
PROT SERPL-MCNC: 6.6 G/DL (ref 6–8.5)
RBC # BLD AUTO: 4.33 10*6/MM3 (ref 3.77–5.28)
SODIUM BLD-SCNC: 136 MMOL/L (ref 136–145)
URATE SERPL-MCNC: 3.9 MG/DL (ref 2.4–5.7)
WBC NRBC COR # BLD: 13.56 10*3/MM3 (ref 3.4–10.8)

## 2019-05-03 PROCEDURE — 82105 ALPHA-FETOPROTEIN SERUM: CPT

## 2019-05-03 PROCEDURE — 82677 ASSAY OF ESTRIOL: CPT

## 2019-05-03 PROCEDURE — 84550 ASSAY OF BLOOD/URIC ACID: CPT | Performed by: NURSE PRACTITIONER

## 2019-05-03 PROCEDURE — 86336 INHIBIN A: CPT

## 2019-05-03 PROCEDURE — 36415 COLL VENOUS BLD VENIPUNCTURE: CPT | Performed by: NURSE PRACTITIONER

## 2019-05-03 PROCEDURE — 80053 COMPREHEN METABOLIC PANEL: CPT | Performed by: NURSE PRACTITIONER

## 2019-05-03 PROCEDURE — 83036 HEMOGLOBIN GLYCOSYLATED A1C: CPT | Performed by: NURSE PRACTITIONER

## 2019-05-03 PROCEDURE — 84702 CHORIONIC GONADOTROPIN TEST: CPT

## 2019-05-03 PROCEDURE — 85025 COMPLETE CBC W/AUTO DIFF WBC: CPT | Performed by: NURSE PRACTITIONER

## 2019-05-03 PROCEDURE — 83615 LACTATE (LD) (LDH) ENZYME: CPT | Performed by: NURSE PRACTITIONER

## 2019-05-05 LAB
2ND TRIMESTER 4 SCREEN SERPL-IMP: NORMAL
2ND TRIMESTER 4 SCREEN SERPL-IMP: NORMAL
AFP ADJ MOM SERPL: 0.76
AFP SERPL-MCNC: 34.6 NG/ML
AGE AT DELIVERY: 28.2 YR
FET TS 18 RISK FROM MAT AGE: NORMAL
FET TS 21 RISK FROM MAT AGE: 842
GA METHOD: NORMAL
GA: 20.3 WEEKS
HCG ADJ MOM SERPL: 0.83
HCG SERPL-ACNC: NORMAL MIU/ML
IDDM PATIENT QL: NO
INHIBIN A ADJ MOM SERPL: 1.74
INHIBIN A SERPL-MCNC: 282.28 PG/ML
LABORATORY COMMENT REPORT: NORMAL
MULTIPLE PREGNANCY: NO
NEURAL TUBE DEFECT RISK FETUS: NORMAL %
RESULT: NORMAL
TS 18 RISK FETUS: NORMAL
TS 21 RISK FETUS: 560
U ESTRIOL ADJ MOM SERPL: 0.34
U ESTRIOL SERPL-MCNC: 0.58 NG/ML

## 2019-05-21 ENCOUNTER — OFFICE VISIT (OUTPATIENT)
Dept: ENDOCRINOLOGY | Facility: CLINIC | Age: 29
End: 2019-05-21

## 2019-05-21 VITALS
DIASTOLIC BLOOD PRESSURE: 70 MMHG | BODY MASS INDEX: 38.8 KG/M2 | WEIGHT: 219 LBS | HEART RATE: 110 BPM | SYSTOLIC BLOOD PRESSURE: 122 MMHG | HEIGHT: 63 IN

## 2019-05-21 DIAGNOSIS — Z3A.20 20 WEEKS GESTATION OF PREGNANCY: ICD-10-CM

## 2019-05-21 DIAGNOSIS — E11.9 TYPE 2 DIABETES MELLITUS WITHOUT COMPLICATION, WITHOUT LONG-TERM CURRENT USE OF INSULIN (HCC): Primary | Chronic | ICD-10-CM

## 2019-05-21 DIAGNOSIS — Z72.0 TOBACCO USE: Chronic | ICD-10-CM

## 2019-05-21 PROCEDURE — 95250 CONT GLUC MNTR PHYS/QHP EQP: CPT | Performed by: NURSE PRACTITIONER

## 2019-05-21 PROCEDURE — 99214 OFFICE O/P EST MOD 30 MIN: CPT | Performed by: NURSE PRACTITIONER

## 2019-05-21 RX ORDER — LORATADINE 10 MG
81 TABLET ORAL 2 TIMES DAILY
Refills: 5 | COMMUNITY
Start: 2019-05-06 | End: 2020-07-21

## 2019-05-21 NOTE — PROGRESS NOTES
"  Subjective    Veena Buck is a 28 y.o. female. she is here today for follow-up.    History of Present Illness         Primary Care Provider     Jr Prieto MD     Duration Dx approx at age 26 years old     Patient is currently 20 weeks      Timing - Diabetes is Constant     Quality -  well controlled     Severity -  moderate     Complications - none     Current symptoms/problems  increase appetite, polydipsia and polyuria      Alleviating Factors: Compliance       Aggravating Factors :  None     Side Effects  none     Current diet  in general, a \"healthy\" diet       Current exercise walking     Current monitoring regimen: home blood tests - checking 2 x daily      Lab Results   Component Value Date    HGBA1C 5.70 (H) 2019             Home blood sugar records: 100s     Am fasting 90 up to 110      at lunch time 78          Hypoglycemia none         The following portions of the patient's history were reviewed and updated as appropriate:   Past Medical History:   Diagnosis Date   • Anxiety    • Asthma    • Diabetes mellitus (CMS/HCC)      Past Surgical History:   Procedure Laterality Date   • ANKLE SURGERY Left      History reviewed. No pertinent family history.  OB History      Para Term  AB Living    1              SAB TAB Ectopic Molar Multiple Live Births                       Current Outpatient Medications   Medication Sig Dispense Refill   • albuterol sulfate  (90 Base) MCG/ACT inhaler Inhale 2 puffs Every 4 (Four) Hours As Needed for Wheezing. 1 inhaler 12   • Blood Glucose Monitoring Suppl (ONE TOUCH ULTRA 2) w/Device kit USE TO TEST SUGAR 4 TIMES DAILY. 150 each 11   • [facility] ASPIRIN ADULT LOW DOSE 81 MG chewable tablet Chew 81 mg 2 (Two) Times a Day.  5   • diphenhydrAMINE (BENADRYL) 25 mg capsule Take 1 capsule by mouth Every 6 (Six) Hours As Needed for Allergies. 30 capsule 0   • doxylamine (UNISOM) 25 MG tablet Take 25 mg by mouth At Night As Needed for Sleep.   "   • glucose blood test strip PRN 60 each 5   • Insulin Glargine (BASAGLAR KWIKPEN) 100 UNIT/ML injection pen Start 15 units qhs but may up titrate up to 40 units qhs 12 pen 11   • insulin lispro (ADMELOG) 100 UNIT/ML injection Inject 2 up to 20 units tid (uses sliding scale) 5 each 5   • Insulin Pen Needle (B-D UF III MINI PEN NEEDLES) 31G X 5 MM misc Use 4 times daily  , ICD10 code is E11.9 120 each 11   • Lancets misc Use to test sugar 4 times daily to go with meter that is covered by insurance 200 each 11   • metFORMIN ER (GLUCOPHAGE-XR) 750 MG 24 hr tablet Take 1 tablet by mouth Daily With Breakfast. 90 tablet 3   • sertraline (ZOLOFT) 50 MG tablet Take 1 tablet by mouth Daily. 90 tablet 2   • vitamin B-6 (PYRIDOXINE) 50 MG tablet Take 25 mg by mouth Daily.       No current facility-administered medications for this visit.      No Known Allergies  Social History     Socioeconomic History   • Marital status:      Spouse name: Not on file   • Number of children: Not on file   • Years of education: Not on file   • Highest education level: Not on file   Tobacco Use   • Smoking status: Current Every Day Smoker     Packs/day: 1.00     Types: Cigarettes   • Smokeless tobacco: Never Used   • Tobacco comment: 1*800*quit*now   Substance and Sexual Activity   • Alcohol use: No   • Drug use: No   • Sexual activity: Yes     Partners: Male     Birth control/protection: None       Review of Systems  Review of Systems   Constitutional: Negative for activity change, appetite change, diaphoresis and fatigue.   HENT: Negative for facial swelling, sneezing, sore throat, tinnitus, trouble swallowing and voice change.    Eyes: Negative for photophobia, pain, discharge, redness, itching and visual disturbance.   Respiratory: Negative for apnea, cough, choking, chest tightness and shortness of breath.    Cardiovascular: Negative for chest pain, palpitations and leg swelling.   Gastrointestinal: Negative for abdominal distention,  "abdominal pain, constipation, diarrhea, nausea and vomiting.   Endocrine: Negative for cold intolerance, heat intolerance, polydipsia, polyphagia and polyuria.   Genitourinary: Negative for difficulty urinating, dysuria, frequency, hematuria and urgency.   Musculoskeletal: Negative for arthralgias, back pain, gait problem, joint swelling, myalgias, neck pain and neck stiffness.   Skin: Negative for color change, pallor, rash and wound.   Neurological: Negative for dizziness, tremors, weakness, light-headedness, numbness and headaches.   Hematological: Negative for adenopathy. Does not bruise/bleed easily.   Psychiatric/Behavioral: Negative for behavioral problems, confusion and sleep disturbance.        Objective    /70 (BP Location: Left arm, Patient Position: Sitting, Cuff Size: Adult)   Pulse 110   Ht 160 cm (63\")   Wt 99.3 kg (219 lb)   BMI 38.79 kg/m²   Physical Exam   Constitutional: She is oriented to person, place, and time. She appears well-developed and well-nourished. No distress.   HENT:   Head: Normocephalic and atraumatic.   Right Ear: External ear normal.   Left Ear: External ear normal.   Nose: Nose normal.   Eyes: Conjunctivae and EOM are normal. Pupils are equal, round, and reactive to light.   Neck: Normal range of motion. Neck supple. No tracheal deviation present. No thyromegaly present.   Cardiovascular: Normal rate, regular rhythm and normal heart sounds.   No murmur heard.  Pulmonary/Chest: Effort normal and breath sounds normal. No respiratory distress. She has no wheezes.   Abdominal: Soft. Bowel sounds are normal. There is no tenderness. There is no rebound and no guarding.   Musculoskeletal: Normal range of motion. She exhibits no edema, tenderness or deformity.   Neurological: She is alert and oriented to person, place, and time. No cranial nerve deficit.   Skin: Skin is warm and dry. No rash noted.   Psychiatric: She has a normal mood and affect. Her behavior is normal. " Judgment and thought content normal.       Lab Review  Glucose (mg/dL)   Date Value   05/03/2019 189 (H)   06/26/2018 309 (H)   06/30/2017 277 (H)     Sodium (mmol/L)   Date Value   05/03/2019 136   06/26/2018 136 (L)   06/30/2017 139     Potassium (mmol/L)   Date Value   05/03/2019 3.9   06/26/2018 4.0   06/30/2017 4.3     Chloride (mmol/L)   Date Value   05/03/2019 104   06/26/2018 97   06/30/2017 97     CO2 (mmol/L)   Date Value   05/03/2019 20.5 (L)   06/26/2018 28.0   06/30/2017 28.0     BUN (mg/dL)   Date Value   05/03/2019 5 (L)   06/26/2018 6 (L)   06/30/2017 5 (L)     Creatinine (mg/dL)   Date Value   05/03/2019 0.54 (L)   06/26/2018 0.60   06/30/2017 0.60     Hemoglobin A1C (%)   Date Value   05/03/2019 5.70 (H)   03/15/2019 6.1   10/30/2018 10.3 (H)   07/05/2018 8.9 (H)   03/15/2018 10.4 (H)     Triglycerides (mg/dL)   Date Value   06/30/2017 388 (H)     LDL Cholesterol  (mg/dL)   Date Value   06/30/2017 117       Assessment/Plan      1. Type 2 diabetes mellitus without complication, without long-term current use of insulin (CMS/[facility])    2. 20 weeks gestation of pregnancy    3. Tobacco use    .    Medications prescribed:  Outpatient Encounter Medications as of 5/21/2019   Medication Sig Dispense Refill   • albuterol sulfate  (90 Base) MCG/ACT inhaler Inhale 2 puffs Every 4 (Four) Hours As Needed for Wheezing. 1 inhaler 12   • Blood Glucose Monitoring Suppl (ONE TOUCH ULTRA 2) w/Device kit USE TO TEST SUGAR 4 TIMES DAILY. 150 each 11   • [facility] ASPIRIN ADULT LOW DOSE 81 MG chewable tablet Chew 81 mg 2 (Two) Times a Day.  5   • diphenhydrAMINE (BENADRYL) 25 mg capsule Take 1 capsule by mouth Every 6 (Six) Hours As Needed for Allergies. 30 capsule 0   • doxylamine (UNISOM) 25 MG tablet Take 25 mg by mouth At Night As Needed for Sleep.     • glucose blood test strip PRN 60 each 5   • Insulin Glargine (BASAGLAR KWIKPEN) 100 UNIT/ML injection pen Start 15 units qhs but may up titrate up to 40 units qhs 12  pen 11   • insulin lispro (ADMELOG) 100 UNIT/ML injection Inject 2 up to 20 units tid (uses sliding scale) 5 each 5   • Insulin Pen Needle (B-D UF III MINI PEN NEEDLES) 31G X 5 MM misc Use 4 times daily  , ICD10 code is E11.9 120 each 11   • Lancets misc Use to test sugar 4 times daily to go with meter that is covered by insurance 200 each 11   • metFORMIN ER (GLUCOPHAGE-XR) 750 MG 24 hr tablet Take 1 tablet by mouth Daily With Breakfast. 90 tablet 3   • sertraline (ZOLOFT) 50 MG tablet Take 1 tablet by mouth Daily. 90 tablet 2   • vitamin B-6 (PYRIDOXINE) 50 MG tablet Take 25 mg by mouth Daily.       No facility-administered encounter medications on file as of 5/21/2019.        Orders placed during this encounter include:  No orders of the defined types were placed in this encounter.    Pt has type 2 diabetes with hyperglycemia     Glycemic Management:       Lab Results   Component Value Date    HGBA1C 5.70 (H) 05/03/2019           Basaglar  ( lantus - levemir ) --- taking 26 units        Your goal for am is 95 or less      -----------       =====================================================================     Mealtime Insulin ( admelog - novolog - humalog - apidra )     For every 15 grams that you eat you take 2 units of novolog      The goal is to be less than 120 , 2 hours post meals      If this is not enough then you may want to increase to 2.5 units per 15 grams      --      This is addition to a sliding scale      120-160 : 2 units  161-200 : 4units  201-240 : 6units  Above 240 : 8units     Example     You are about to eat 60 grams of carbohdyrate and the glucose before the meal is 220      For the 60 grams -- 8 units   For the 220 glucose -- 6units     Total 14 units         Uncontrolled diabetes  Hyperglycemia    Insertion of continuous glucose monitor to define patter    The continuous glucose monitor that was inserted is a alan glucose monitor      Follow up one week          ===                               Lab Results   Component Value Date     TSH 1.840 07/05/2018            Preventive care:     Smoking     I advised Veena of the risks of continuing to use tobacco, and I provided her with tobacco cessation educational materials in the After Visit Summary.     During this visit, I spent less than 3  minutes counseling the patient regarding tobacco cessation.          Weight management:     Patient's Body mass index is 38.79 kg/m². BMI is above normal parameters. Recommendations include: educational material.      4. Follow-up: Return in about 1 week (around 5/28/2019) for Recheck.

## 2019-05-28 ENCOUNTER — OFFICE VISIT (OUTPATIENT)
Dept: ENDOCRINOLOGY | Facility: CLINIC | Age: 29
End: 2019-05-28

## 2019-05-28 DIAGNOSIS — E11.9 TYPE 2 DIABETES MELLITUS WITHOUT COMPLICATION, WITHOUT LONG-TERM CURRENT USE OF INSULIN (HCC): Primary | Chronic | ICD-10-CM

## 2019-05-28 PROCEDURE — 95251 CONT GLUC MNTR ANALYSIS I&R: CPT | Performed by: NURSE PRACTITIONER

## 2019-05-28 RX ORDER — BLOOD-GLUCOSE METER
EACH MISCELLANEOUS
Qty: 150 EACH | Refills: 11 | Status: SHIPPED | OUTPATIENT
Start: 2019-05-28 | End: 2020-07-30

## 2019-05-28 NOTE — PROGRESS NOTES
Veena Buck is a 28 y.o. female seen by diabetes educator 05/28/2019  to remove diagnostic Maryjo sensor. Completed sensor download. Reviewed with ALDAIR Thomson. Avg. Glucose 110. In [employer] range 76%, above 8%, hypoglycemia 16%. Patient currently not giving for breakfast. Lunch giving 10-15 un. After dinner she gives about 10-15 units then states her sugar gets down to 70 so she eats a bedtime snack. Patient stated if her sugar is 130 she doesn't give insulin but if it's 150, then she will give 15 units. Reminded to give Basaglar 26 units every day no matter if she eats or not. Give same time of day. Do not stack insulin that's how she ended up having a low sugar of 40. Reviewed pregnancy goals waking and before meals <95, 2 hours after meals <120. Reviewed recommended carb intake for pregnancy. Instructed to count carbs. Give 2:15 plus sliding scale 1:40 above 120.      Keep follow up appt on 6/26/19. Recommended 2 wks follow up - patient stated she will keep a log and return in 1 week for follow up. Appointment scheduled.   TYLOR GalindoN, RN  Diabetes Educator

## 2019-05-28 NOTE — TELEPHONE ENCOUNTER
More test strips   Received: Today   Message Contents   Emmy Alvarez RN Campbell, Selma Billy MA             Needs more test strips sent in.

## 2019-05-29 ENCOUNTER — TELEPHONE (OUTPATIENT)
Dept: FAMILY MEDICINE CLINIC | Facility: CLINIC | Age: 29
End: 2019-05-29

## 2019-05-29 ENCOUNTER — TELEPHONE (OUTPATIENT)
Dept: ENDOCRINOLOGY | Facility: CLINIC | Age: 29
End: 2019-05-29

## 2019-05-29 NOTE — TELEPHONE ENCOUNTER
Pt needs test strips for the Ultra One , got the machine but no test strips.    Pharmacy [facility]      Call [phone] [facility]

## 2019-06-05 ENCOUNTER — OFFICE VISIT (OUTPATIENT)
Dept: ENDOCRINOLOGY | Facility: CLINIC | Age: 29
End: 2019-06-05

## 2019-06-05 DIAGNOSIS — E11.9 TYPE 2 DIABETES MELLITUS WITHOUT COMPLICATION, WITHOUT LONG-TERM CURRENT USE OF INSULIN (HCC): Primary | Chronic | ICD-10-CM

## 2019-06-05 NOTE — PROGRESS NOTES
Veena Buck is a 28 y.o. female seen by diabetes educator 06/05/2019  to review blood sugars and medication. Patient currently giving 5 units for breakfast. Discussed sometimes she eats 2-3 biscuits and needs more insulin for 3 biscuits.  understood and patient stated it is hard for her to remember. At lunch sometimes she checks and gives after. Discussed checking before meals and giving insulin before meals. Patient stated she has vial and syringe so hard to take out. Will see if she can change to pens. Patient eating morning snack and bedtime snack - sometimes will give for snacks depending on blood sugar. Judges insulin need at dinner based on blood sugar - stated sometimes she doesn't give and sometimes she gives 5-10 un for dinner. Gives Basaglar 26 un nightly. Takes 1 metformin 750 tab in AM.   Discussed tobacco cessation and dangers during pregnancy. Patient stated she has cut back to half a pack a day and uses a vaporizer. Patient states she takes 1 aspirin 81mg a day because she forgets the second pill. Reinforced medication compliance. Patient will be 23 weeks tomorrow. Got anatomy scan done last week per patient.     Keep follow up appt with Joshua.   Emmy Alvarez, TYLORN, RN  Diabetes Educator

## 2019-06-06 ENCOUNTER — HOSPITAL ENCOUNTER (OUTPATIENT)
Facility: HOSPITAL | Age: 29
Discharge: HOME | End: 2019-06-06
Attending: OBSTETRICS & GYNECOLOGY | Admitting: OBSTETRICS & GYNECOLOGY

## 2019-06-06 VITALS
DIASTOLIC BLOOD PRESSURE: 63 MMHG | HEART RATE: 88 BPM | TEMPERATURE: 98 F | SYSTOLIC BLOOD PRESSURE: 136 MMHG | OXYGEN SATURATION: 97 %

## 2019-06-06 LAB
BACTERIA UR QL AUTO: ABNORMAL /HPF
BILIRUB UR QL STRIP: NEGATIVE
CLARITY UR: ABNORMAL
COLOR UR: YELLOW
DEPRECATED RDW RBC AUTO: 41.9 FL (ref 37–54)
EOSINOPHIL # BLD MANUAL: 0.59 10*3/MM3 (ref 0–0.4)
EOSINOPHIL NFR BLD MANUAL: 4 % (ref 0.3–6.2)
ERYTHROCYTE [DISTWIDTH] IN BLOOD BY AUTOMATED COUNT: 13.1 % (ref 12.3–15.4)
GLUCOSE UR STRIP-MCNC: NEGATIVE MG/DL
HCT VFR BLD AUTO: 36.4 % (ref 34–46.6)
HGB BLD-MCNC: 12.4 G/DL (ref 12–15.9)
HGB UR QL STRIP.AUTO: NEGATIVE
HYALINE CASTS UR QL AUTO: ABNORMAL /LPF
KETONES UR QL STRIP: NEGATIVE
LEUKOCYTE ESTERASE UR QL STRIP.AUTO: ABNORMAL
LYMPHOCYTES # BLD MANUAL: 2.51 10*3/MM3 (ref 0.7–3.1)
LYMPHOCYTES NFR BLD MANUAL: 17 % (ref 19.6–45.3)
LYMPHOCYTES NFR BLD MANUAL: 2 % (ref 5–12)
MCH RBC QN AUTO: 30 PG (ref 26.6–33)
MCHC RBC AUTO-ENTMCNC: 34.1 G/DL (ref 31.5–35.7)
MCV RBC AUTO: 87.9 FL (ref 79–97)
MONOCYTES # BLD AUTO: 0.3 10*3/MM3 (ref 0.1–0.9)
MYELOCYTES NFR BLD MANUAL: 1 % (ref 0–0)
NEUTROPHILS # BLD AUTO: 11.23 10*3/MM3 (ref 1.7–7)
NEUTROPHILS NFR BLD MANUAL: 72 % (ref 42.7–76)
NEUTS BAND NFR BLD MANUAL: 4 % (ref 0–5)
NITRITE UR QL STRIP: NEGATIVE
PH UR STRIP.AUTO: 5.5 [PH] (ref 5–9)
PLAT MORPH BLD: NORMAL
PLATELET # BLD AUTO: 193 10*3/MM3 (ref 140–450)
PMV BLD AUTO: 10.2 FL (ref 6–12)
PROT UR QL STRIP: NEGATIVE
RBC # BLD AUTO: 4.14 10*6/MM3 (ref 3.77–5.28)
RBC # UR: ABNORMAL /HPF
RBC MORPH BLD: NORMAL
REF LAB TEST METHOD: ABNORMAL
SP GR UR STRIP: 1.01 (ref 1–1.03)
SQUAMOUS #/AREA URNS HPF: ABNORMAL /HPF
UROBILINOGEN UR QL STRIP: ABNORMAL
WBC MORPH BLD: NORMAL
WBC NRBC COR # BLD: 14.78 10*3/MM3 (ref 3.4–10.8)
WBC UR QL AUTO: ABNORMAL /HPF
YEAST URNS QL MICRO: ABNORMAL /HPF

## 2019-06-06 PROCEDURE — 36415 COLL VENOUS BLD VENIPUNCTURE: CPT | Performed by: OBSTETRICS & GYNECOLOGY

## 2019-06-06 PROCEDURE — 59025 FETAL NON-STRESS TEST: CPT

## 2019-06-06 PROCEDURE — 85027 COMPLETE CBC AUTOMATED: CPT | Performed by: OBSTETRICS & GYNECOLOGY

## 2019-06-06 PROCEDURE — 81001 URINALYSIS AUTO W/SCOPE: CPT | Performed by: OBSTETRICS & GYNECOLOGY

## 2019-06-06 PROCEDURE — G0463 HOSPITAL OUTPT CLINIC VISIT: HCPCS

## 2019-06-06 PROCEDURE — 85007 BL SMEAR W/DIFF WBC COUNT: CPT | Performed by: OBSTETRICS & GYNECOLOGY

## 2019-06-06 NOTE — NURSING NOTE
Pt has a big bag of [employer] food is sitting calmly says she is going to eat, the baby is hungry. I told her we were doing some tests and needs to wait. Says she feels some better.

## 2019-06-06 NOTE — NON STRESS TEST
Veena Buck, a  at 25w1d with an JOSEF of 2019, Date entered prior to episode creation, was seen at [facility] DELIVERY for a nonstress test.    Chief Complaint   Patient presents with   • Abdominal Cramping     middle of abd.       Patient Active Problem List   Diagnosis   • Tobacco use   • Type 2 diabetes mellitus without complication, without long-term current use of insulin (CMS/[facility])   • BMI 37.0-37.9, adult   • ARIADNA (generalized anxiety disorder)   • Panic anxiety syndrome   • 12 weeks gestation of pregnancy   • Pregnancy with 10 completed weeks gestation   • 20 weeks gestation of pregnancy       Start Time: 1238  Stop Time: 1256    Interpretation A  Nonstress Test Interpretation A: Reactive (19 1256 : Alexandria Drake, RN)  Comments A: 25 weeks (19 1256 : Alexandria Drake, RN)

## 2019-06-26 ENCOUNTER — OFFICE VISIT (OUTPATIENT)
Dept: ENDOCRINOLOGY | Facility: CLINIC | Age: 29
End: 2019-06-26

## 2019-06-26 VITALS
SYSTOLIC BLOOD PRESSURE: 124 MMHG | BODY MASS INDEX: 39.51 KG/M2 | HEART RATE: 107 BPM | DIASTOLIC BLOOD PRESSURE: 76 MMHG | WEIGHT: 223 LBS | HEIGHT: 63 IN

## 2019-06-26 DIAGNOSIS — E11.9 TYPE 2 DIABETES MELLITUS WITHOUT COMPLICATION, WITHOUT LONG-TERM CURRENT USE OF INSULIN (HCC): Primary | Chronic | ICD-10-CM

## 2019-06-26 DIAGNOSIS — Z72.0 TOBACCO USE: Chronic | ICD-10-CM

## 2019-06-26 DIAGNOSIS — Z3A.26 26 WEEKS GESTATION OF PREGNANCY: ICD-10-CM

## 2019-06-26 LAB — HBA1C MFR BLD: 5.6 %

## 2019-06-26 PROCEDURE — 83036 HEMOGLOBIN GLYCOSYLATED A1C: CPT | Performed by: NURSE PRACTITIONER

## 2019-06-26 PROCEDURE — 99214 OFFICE O/P EST MOD 30 MIN: CPT | Performed by: NURSE PRACTITIONER

## 2019-06-26 NOTE — PROGRESS NOTES
"  Subjective    Veena Buck is a 29 y.o. female. she is here today for follow-up.    History of Present Illness         Primary Care Provider     Jr Prieto MD     Duration Dx approx at age 26 years old     Patient is currently 26 weeks      Timing - Diabetes is Constant     Quality -  well controlled     Severity -  moderate     Complications - none     Current symptoms/problems  increase appetite, polydipsia and polyuria      Alleviating Factors: Compliance       Aggravating Factors :  None     Side Effects  none     Current diet  in general, a \"healthy\" diet       Current exercise walking     Current monitoring regimen: home blood tests - checking 2 x daily       Lab Results   Component Value Date    HGBA1C 5.6 2019                   Home blood sugar records: 100s     Am fasting 90 up to 110       at lunch time 78            Hypoglycemia none           The following portions of the patient's history were reviewed and updated as appropriate:   Past Medical History:   Diagnosis Date   • Anxiety    • Asthma    • Diabetes mellitus (CMS/HCC)      Past Surgical History:   Procedure Laterality Date   • ANKLE SURGERY Left      History reviewed. No pertinent family history.  OB History      Para Term  AB Living    1              SAB TAB Ectopic Molar Multiple Live Births                       Current Outpatient Medications   Medication Sig Dispense Refill   • albuterol sulfate  (90 Base) MCG/ACT inhaler Inhale 2 puffs Every 4 (Four) Hours As Needed for Wheezing. 1 inhaler 12   • Blood Glucose Monitoring Suppl (ONE TOUCH ULTRA 2) w/Device kit USE TO TEST SUGAR 4 TIMES DAILY.  Dx-e11.9 150 each 11   • [facility] ASPIRIN ADULT LOW DOSE 81 MG chewable tablet Chew 81 mg 2 (Two) Times a Day.  5   • diphenhydrAMINE (BENADRYL) 25 mg capsule Take 1 capsule by mouth Every 6 (Six) Hours As Needed for Allergies. 30 capsule 0   • glucose blood test strip Test 4 times daily DX E11.9 100 each 11   • " hydrocortisone 1 % cream Apply  topically to the appropriate area as directed 2 (Two) Times a Day. Do not apply longer than 2 weeks 20 g 0   • Insulin Glargine (BASAGLAR KWIKPEN) 100 UNIT/ML injection pen Start 15 units qhs but may up titrate up to 40 units qhs 12 pen 11   • insulin lispro (ADMELOG) 100 UNIT/ML injection Inject 2 up to 20 units tid (uses sliding scale) 5 each 5   • Insulin Pen Needle (B-D UF III MINI PEN NEEDLES) 31G X 5 MM misc Use 4 times daily  , ICD10 code is E11.9 120 each 11   • Lancets misc Use to test sugar 4 times daily to go with meter that is covered by insurance 200 each 11   • metFORMIN ER (GLUCOPHAGE-XR) 750 MG 24 hr tablet Take 1 tablet by mouth Daily With Breakfast. 90 tablet 3   • sertraline (ZOLOFT) 50 MG tablet Take 1 tablet by mouth Daily. 90 tablet 2   • vitamin B-6 (PYRIDOXINE) 50 MG tablet Take 25 mg by mouth Daily.       No current facility-administered medications for this visit.      No Known Allergies  Social History     Socioeconomic History   • Marital status:      Spouse name: Not on file   • Number of children: Not on file   • Years of education: Not on file   • Highest education level: Not on file   Tobacco Use   • Smoking status: Current Every Day Smoker     Packs/day: 1.00     Types: Cigarettes   • Smokeless tobacco: Never Used   • Tobacco comment: 1*800*quit*now   Substance and Sexual Activity   • Alcohol use: No   • Drug use: No   • Sexual activity: Yes     Partners: Male     Birth control/protection: None       Review of Systems  Review of Systems   Constitutional: Negative for activity change, appetite change, diaphoresis and fatigue.   HENT: Negative for facial swelling, sneezing, sore throat, tinnitus, trouble swallowing and voice change.    Eyes: Negative for photophobia, pain, discharge, redness, itching and visual disturbance.   Respiratory: Negative for apnea, cough, choking, chest tightness and shortness of breath.    Cardiovascular: Negative for  "chest pain, palpitations and leg swelling.   Gastrointestinal: Negative for abdominal distention, abdominal pain, constipation, diarrhea, nausea and vomiting.   Endocrine: Negative for cold intolerance, heat intolerance, polydipsia, polyphagia and polyuria.   Genitourinary: Negative for difficulty urinating, dysuria, frequency, hematuria and urgency.   Musculoskeletal: Negative for arthralgias, back pain, gait problem, joint swelling, myalgias, neck pain and neck stiffness.   Skin: Negative for color change, pallor, rash and wound.   Neurological: Negative for dizziness, tremors, weakness, light-headedness, numbness and headaches.   Hematological: Negative for adenopathy. Does not bruise/bleed easily.   Psychiatric/Behavioral: Negative for behavioral problems, confusion and sleep disturbance.        Objective    /76 (BP Location: Right arm, Patient Position: Sitting, Cuff Size: Adult)   Pulse 107   Ht 160 cm (63\")   Wt 101 kg (223 lb)   BMI 39.50 kg/m²   Physical Exam   Constitutional: She is oriented to person, place, and time. She appears well-developed and well-nourished. No distress.   HENT:   Head: Normocephalic and atraumatic.   Right Ear: External ear normal.   Left Ear: External ear normal.   Nose: Nose normal.   Eyes: Conjunctivae and EOM are normal. Pupils are equal, round, and reactive to light.   Neck: Normal range of motion. Neck supple. No tracheal deviation present. No thyromegaly present.   Cardiovascular: Normal rate, regular rhythm and normal heart sounds.   No murmur heard.  Pulmonary/Chest: Effort normal and breath sounds normal. No respiratory distress. She has no wheezes.   Abdominal: Soft. Bowel sounds are normal. There is no tenderness. There is no rebound and no guarding.   Musculoskeletal: Normal range of motion. She exhibits no edema, tenderness or deformity.   Neurological: She is alert and oriented to person, place, and time. No cranial nerve deficit.   Skin: Skin is warm and " dry. No rash noted.   Psychiatric: She has a normal mood and affect. Her behavior is normal. Judgment and thought content normal.       Lab Review  Glucose (mg/dL)   Date Value   05/03/2019 189 (H)   06/26/2018 309 (H)   06/30/2017 277 (H)     Sodium (mmol/L)   Date Value   05/03/2019 136   06/26/2018 136 (L)   06/30/2017 139     Potassium (mmol/L)   Date Value   05/03/2019 3.9   06/26/2018 4.0   06/30/2017 4.3     Chloride (mmol/L)   Date Value   05/03/2019 104   06/26/2018 97   06/30/2017 97     CO2 (mmol/L)   Date Value   05/03/2019 20.5 (L)   06/26/2018 28.0   06/30/2017 28.0     BUN (mg/dL)   Date Value   05/03/2019 5 (L)   06/26/2018 6 (L)   06/30/2017 5 (L)     Creatinine (mg/dL)   Date Value   05/03/2019 0.54 (L)   06/26/2018 0.60   06/30/2017 0.60     Hemoglobin A1C (%)   Date Value   06/26/2019 5.6   05/03/2019 5.70 (H)   03/15/2019 6.1   10/30/2018 10.3 (H)   07/05/2018 8.9 (H)   03/15/2018 10.4 (H)     Triglycerides (mg/dL)   Date Value   06/30/2017 388 (H)     LDL Cholesterol  (mg/dL)   Date Value   06/30/2017 117       Assessment/Plan      1. Type 2 diabetes mellitus without complication, without long-term current use of insulin (CMS/[facility])    2. 26 weeks gestation of pregnancy    3. Tobacco use    .    Medications prescribed:  Outpatient Encounter Medications as of 6/26/2019   Medication Sig Dispense Refill   • albuterol sulfate  (90 Base) MCG/ACT inhaler Inhale 2 puffs Every 4 (Four) Hours As Needed for Wheezing. 1 inhaler 12   • Blood Glucose Monitoring Suppl (ONE TOUCH ULTRA 2) w/Device kit USE TO TEST SUGAR 4 TIMES DAILY.  Dx-e11.9 150 each 11   • [facility] ASPIRIN ADULT LOW DOSE 81 MG chewable tablet Chew 81 mg 2 (Two) Times a Day.  5   • diphenhydrAMINE (BENADRYL) 25 mg capsule Take 1 capsule by mouth Every 6 (Six) Hours As Needed for Allergies. 30 capsule 0   • glucose blood test strip Test 4 times daily DX E11.9 100 each 11   • hydrocortisone 1 % cream Apply  topically to the appropriate area  as directed 2 (Two) Times a Day. Do not apply longer than 2 weeks 20 g 0   • Insulin Glargine (BASAGLAR KWIKPEN) 100 UNIT/ML injection pen Start 15 units qhs but may up titrate up to 40 units qhs 12 pen 11   • insulin lispro (ADMELOG) 100 UNIT/ML injection Inject 2 up to 20 units tid (uses sliding scale) 5 each 5   • Insulin Pen Needle (B-D UF III MINI PEN NEEDLES) 31G X 5 MM misc Use 4 times daily  , ICD10 code is E11.9 120 each 11   • Lancets misc Use to test sugar 4 times daily to go with meter that is covered by insurance 200 each 11   • metFORMIN ER (GLUCOPHAGE-XR) 750 MG 24 hr tablet Take 1 tablet by mouth Daily With Breakfast. 90 tablet 3   • sertraline (ZOLOFT) 50 MG tablet Take 1 tablet by mouth Daily. 90 tablet 2   • vitamin B-6 (PYRIDOXINE) 50 MG tablet Take 25 mg by mouth Daily.       No facility-administered encounter medications on file as of 6/26/2019.        Orders placed during this encounter include:  Orders Placed This Encounter   Procedures   • POC Glycosylated Hemoglobin (Hb A1C)     Pt has type 2 diabetes with hyperglycemia     Glycemic Management:       Lab Results   Component Value Date    HGBA1C 5.6 06/26/2019                   Basaglar  ( lantus - levemir ) --- taking 26 units         Your goal for am is 95 or less      -----------        =====================================================================     Mealtime Insulin ( admelog - novolog - humalog - apidra )    Admelog     2 units per 15 grams of CHO            --     This is addition to a sliding scale      120-160 : 2 units  161-200 : 4units  201-240 : 6units  Above 240 : 8units     Example     You are about to eat 60 grams of carbohdyrate and the glucose before the meal is 220      For the 60 grams -- 8 units   For the 220 glucose -- 6units     Total 14 units                    ===                              Lab Results   Component Value Date     TSH 1.840 07/05/2018            Preventive care:     Smoking     I advised  Veena of the risks of continuing to use tobacco, and I provided her with tobacco cessation educational materials in the After Visit Summary.      During this visit, I spent less than 3  minutes counseling the patient regarding tobacco cessation.           Weight management:      Patient's Body mass index is 39.5 kg/m². BMI is above normal parameters. Recommendations include: educational material.           4. Follow-up: Return in about 3 weeks (around 7/17/2019) for Recheck.

## 2019-07-09 ENCOUNTER — HOSPITAL ENCOUNTER (OUTPATIENT)
Facility: HOSPITAL | Age: 29
Discharge: HOME | End: 2019-07-09
Attending: OBSTETRICS & GYNECOLOGY | Admitting: OBSTETRICS & GYNECOLOGY

## 2019-07-09 VITALS
RESPIRATION RATE: 18 BRPM | TEMPERATURE: 98.4 F | OXYGEN SATURATION: 98 % | HEART RATE: 99 BPM | DIASTOLIC BLOOD PRESSURE: 70 MMHG | SYSTOLIC BLOOD PRESSURE: 142 MMHG

## 2019-07-09 LAB
BACTERIA UR QL AUTO: ABNORMAL /HPF
BILIRUB UR QL STRIP: NEGATIVE
CLARITY UR: ABNORMAL
COLOR UR: YELLOW
GLUCOSE UR STRIP-MCNC: ABNORMAL MG/DL
GRAN CASTS URNS QL MICRO: ABNORMAL /LPF
HGB UR QL STRIP.AUTO: NEGATIVE
HYALINE CASTS UR QL AUTO: ABNORMAL /LPF
KETONES UR QL STRIP: NEGATIVE
LEUKOCYTE ESTERASE UR QL STRIP.AUTO: ABNORMAL
NITRITE UR QL STRIP: POSITIVE
PH UR STRIP.AUTO: 6 [PH] (ref 5–9)
PROT UR QL STRIP: NEGATIVE
RBC # UR: ABNORMAL /HPF
REF LAB TEST METHOD: ABNORMAL
SP GR UR STRIP: 1.02 (ref 1–1.03)
SQUAMOUS #/AREA URNS HPF: ABNORMAL /HPF
UROBILINOGEN UR QL STRIP: ABNORMAL
WBC UR QL AUTO: ABNORMAL /HPF

## 2019-07-09 PROCEDURE — 59025 FETAL NON-STRESS TEST: CPT | Performed by: OBSTETRICS & GYNECOLOGY

## 2019-07-09 PROCEDURE — 59025 FETAL NON-STRESS TEST: CPT

## 2019-07-09 PROCEDURE — G0463 HOSPITAL OUTPT CLINIC VISIT: HCPCS

## 2019-07-09 PROCEDURE — 81001 URINALYSIS AUTO W/SCOPE: CPT | Performed by: OBSTETRICS & GYNECOLOGY

## 2019-07-09 RX ORDER — SODIUM CHLORIDE 9 MG/ML
INJECTION, SOLUTION INTRAVENOUS
Status: DISCONTINUED
Start: 2019-07-09 | End: 2019-07-09 | Stop reason: HOSPADM

## 2019-07-09 RX ORDER — SODIUM CHLORIDE 9 MG/ML
999 INJECTION, SOLUTION INTRAVENOUS ONCE
Status: DISCONTINUED | OUTPATIENT
Start: 2019-07-09 | End: 2019-07-09 | Stop reason: HOSPADM

## 2019-07-09 RX ORDER — PRENATAL VIT NO.126/IRON/FOLIC 28MG-0.8MG
1 TABLET ORAL DAILY
COMMUNITY
End: 2020-07-21

## 2019-07-09 RX ORDER — RANITIDINE 150 MG/1
150 TABLET ORAL 2 TIMES DAILY
COMMUNITY
End: 2020-07-21

## 2019-07-09 RX ORDER — ONDANSETRON 2 MG/ML
4 INJECTION INTRAMUSCULAR; INTRAVENOUS EVERY 6 HOURS PRN
Status: DISCONTINUED | OUTPATIENT
Start: 2019-07-09 | End: 2019-07-09 | Stop reason: HOSPADM

## 2019-07-09 RX ORDER — NITROFURANTOIN 25; 75 MG/1; MG/1
100 CAPSULE ORAL 2 TIMES DAILY
Qty: 6 CAPSULE | Refills: 0 | Status: SHIPPED | OUTPATIENT
Start: 2019-07-09 | End: 2019-12-30 | Stop reason: HOSPADM

## 2019-07-09 RX ORDER — SODIUM CHLORIDE, SODIUM LACTATE, POTASSIUM CHLORIDE, CALCIUM CHLORIDE 600; 310; 30; 20 MG/100ML; MG/100ML; MG/100ML; MG/100ML
150 INJECTION, SOLUTION INTRAVENOUS CONTINUOUS
Status: DISCONTINUED | OUTPATIENT
Start: 2019-07-09 | End: 2019-07-09 | Stop reason: HOSPADM

## 2019-07-09 NOTE — NON STRESS TEST
Veena Buck, a  at 29w6d with an JOSEF of 2019, Date entered prior to episode creation, was seen at [facility] DELIV[facility] for a nonstress test.    Chief Complaint   Patient presents with   • nausea     patient complains of nausea and vomiting.        Patient Active Problem List   Diagnosis   • Tobacco use   • Type 2 diabetes mellitus without complication, without long-term current use of insulin (CMS/[facility])   • BMI 37.0-37.9, adult   • ARIADNA (generalized anxiety disorder)   • Panic anxiety syndrome   • 12 weeks gestation of pregnancy   • Pregnancy with 10 completed weeks gestation   • 20 weeks gestation of pregnancy   • 26 weeks gestation of pregnancy       Start Time:1720  Stop Time: 180  Interpretation A  Nonstress Test Interpretation A: Reactive (19 : Kalli Nielson, RN)  Comments A: Reviewed with KERLINE Kang RN;   (19 : Kalli Nielson, RN)    Patient non compliant with monitoring for NST. Kept sitting up in bed after being asked to lean back for monitoring.

## 2019-07-09 NOTE — NURSING NOTE
Attempted x2 iv sticks on patient.  Left hand and left AC, unsuccessful.  Patient refused to be stuck again.  Requests to be discharged after urine test results.

## 2019-07-09 NOTE — DISCHARGE INSTR - OTHER ORDERS
Return to labor and delivery if you have:  Regular painful contractions, vaginal bleeding, leaking fluid or water breaks.  If you have decreased fetal movement.  Drink plenty of water.  Take medications as prescribed.  Keep next scheduled appointment.

## 2019-07-10 NOTE — PROGRESS NOTES
[facility]  Obstetric History and Physical    Chief Complaint   Patient presents with   • nausea     patient complains of nausea and vomiting.            Patient is a 29 y.o. female  currently at 29w6d, who presents with complaint of nausea and vomiting for last 3 to 4 days she is able to hold clear liquids down.  Her medical history is complicated by history of type 2 diabetes she is on metformin and insulin.  Nausea and vomiting is described as throughout the day nothing seems to make it better or worse she is try Phenergan and it really has not helped its not bilious in nature.  There is no blood tinge to it.    Her prenatal care is is at [facility] in [place]     Obstetric History   #: 1, Date: None, Sex: None, Weight: None, GA: None, Delivery: None, Apgar1: None, Apgar5: None, Living: None, Birth Comments: None       The following portions of the patients history were reviewed and updated as appropriate: current medications, allergies, past medical history, past surgical history, past family history, past social history and problem list .       Prenatal Information:  Prenatal Results     Initial Prenatal Labs     Test Value Reference Range Date Time    Hemoglobin 12.8 g/dL 12.0 - 15.9 g/dL 19 1003    Hematocrit 38.2 % 34.0 - 46.6 % 19 1003    Platelets 193 10*3/mm3 140 - 450 10*3/mm3 19 1329    Rubella IgG 14.7 IU/mL 0.0 - 9.9 IU/mL 19 1518      Immune  Immune 19 1518    Hepatitis B SAg Negative  Negative 19 1518    Hepatitis C Ab Negative  Negative 19 1518    RPR Non-Reactive  Non-Reactive 19 1518    ABO O   19 1518    Rh Negative   19 1518    Antibody Screen Negative   19 1518    HIV Negative  Negative 19 1518    Urine Culture >100,000 CFU/mL Escherichia coli   19 1518    Gonorrhea        Chlamydia        TSH 1.390 mIU/mL 0.460 - 4.680 mIU/mL 19 1315          2nd and 3rd Trimester     Test Value  Reference Range Date Time    Hemoglobin (repeated) 12.4 g/dL 12.0 - 15.9 g/dL 06/06/19 1329    Hematocrit (repeated) 36.4 % 34.0 - 46.6 % 06/06/19 1329    GCT        Antibody Screen (repeated)        GTT Fasting        GTT 1 Hr        GTT 2 Hr        GTT 3 Hr        Group B Strep              Drug Screening     Test Value Reference Range Date Time    Amphetamine Screen Negative  Negative 02/19/19 1518    Barbiturate Screen Negative  Negative 02/19/19 1518    Benzodiazepine Screen Negative  Negative 02/19/19 1518    Methadone Screen Negative  Negative 02/19/19 1518    Phencyclidine Screen        Opiates Screen Negative  Negative 02/19/19 1518    THC Screen Negative  Negative 02/19/19 1518    Cocaine Screen Negative  Negative 02/19/19 1518    Propoxyphene Screen        Buprenorphine Screen        Methamphetamine Screen        Oxycodone Screen Negative  Negative 02/19/19 1518    Tricyclic Antidepressants Screen              Other (Risk screening)     Test Value Reference Range Date Time    Varicella IgG        Parvovirus IgG        CMV IgG        Cystic Fibrosis        Hemoglobin electrophoresis        NIPT        MSAFP-4 *Screen Negative*   05/03/19 1003    AFP (for NTD only)                  External Prenatal Results     Pregnancy Outside Results - Transcribed From Office Records - See Scanned Records For Details     Test Value Date Time    Hgb 12.4 g/dL 06/06/19 1329    Hct 36.4 % 06/06/19 1329    ABO O  02/19/19 1518    Rh Negative  02/19/19 1518    Antibody Screen Negative  02/19/19 1518    Glucose Fasting GTT       Glucose Tolerance Test 1 hour       Glucose Tolerance Test 3 hour       Gonorrhea (discrete)       Chlamydia (discrete)       RPR Non-Reactive  02/19/19 1518    VDRL       Syphilis Antibody       Rubella 14.7 IU/mL 02/19/19 1518      Immune  02/19/19 1518    HBsAg Negative  02/19/19 1518    Herpes Simplex Virus PCR       Herpes Simplex VIrus Culture       HIV Negative  02/19/19 1518    Hep C RNA Quant  PCR       Hep C Antibody Negative  19 1518    AFP 34.6 ng/mL 19 1003    Group B Strep       GBS Susceptibility to Clindamycin       GBS Susceptibility to Erythromycin       Fetal Fibronectin       Genetic Testing, Maternal Blood             Drug Screening     Test Value Date Time    Urine Drug Screen       Amphetamine Screen Negative  19 1518    Barbiturate Screen Negative  19 1518    Benzodiazepine Screen Negative  19 1518    Methadone Screen Negative  19 1518    Phencyclidine Screen       Opiates Screen Negative  19 1518    THC Screen Negative  19 1518    Cocaine Screen       Propoxyphene Screen       Buprenorphine Screen       Methamphetamine Screen       Oxycodone Screen Negative  19 1518    Tricyclic Antidepressants Screen                    Past OB History:     Obstetric History       T0      L0     SAB0   TAB0   Ectopic0   Molar0   Multiple0   Live Births0       # Outcome Date GA Lbr Ky/2nd Weight Sex Delivery Anes PTL Lv   1 Current                    ALLERGIES:   No Known Allergies     Home Medications:     Prior to Admission medications    Medication Sig Start Date End Date Taking? Authorizing Provider   albuterol sulfate  (90 Base) MCG/ACT inhaler Inhale 2 puffs Every 4 (Four) Hours As Needed for Wheezing. 4/3/19  Yes Jr Prieto MD   Blood Glucose Monitoring Suppl (ONE TOUCH ULTRA 2) w/Device kit USE TO TEST SUGAR 4 TIMES DAILY.  Dx-e11.9 19  Yes Joshua Jean APRN   [facility] ASPIRIN ADULT LOW DOSE 81 MG chewable tablet Chew 81 mg 2 (Two) Times a Day. 19  Yes ProviderMaulik MD   glucose blood test strip Test 4 times daily DX E11.9 19  Yes Joshua Jean APRN   Insulin Glargine (BASAGLAR KWIKPEN) 100 UNIT/ML injection pen Start 15 units qhs but may up titrate up to 40 units qhs 19  Yes Jude Meeks MD   insulin lispro (ADMELOG) 100 UNIT/ML injection Inject 2 up to 20 units tid  (uses sliding scale) 3/19/19  Yes Joshua Jean APRN   Insulin Pen Needle (B-D UF III MINI PEN NEEDLES) 31G X 5 MM misc Use 4 times daily  , ICD10 code is E11.9 1/28/19  Yes Jude Meeks MD   Lancets misc Use to test sugar 4 times daily to go with meter that is covered by insurance 1/28/19  Yes Jude Meeks MD   metFORMIN ER (GLUCOPHAGE-XR) 750 MG 24 hr tablet Take 1 tablet by mouth Daily With Breakfast. 4/3/19  Yes Joshua Jean APRN   Prenatal Vit-Fe Fumarate-FA (PRENATAL, CLASSIC, VITAMIN) 28-0.8 MG tablet tablet Take 1 tablet by mouth Daily.   Yes Maulik Mclaughlin MD   promethazine (PHENERGAN) 25 MG tablet Take 1 tablet by mouth Every 6 (Six) Hours As Needed for Nausea or Vomiting. 7/8/19  Yes Bebe Gutierrez APRN   raNITIdine (ZANTAC) 150 MG tablet Take 150 mg by mouth 2 (Two) Times a Day.   Yes Maulik Mclaughlin MD   sertraline (ZOLOFT) 50 MG tablet Take 1 tablet by mouth Daily. 3/5/19  Yes Jr Prieto MD   vitamin B-6 (PYRIDOXINE) 50 MG tablet Take 25 mg by mouth Daily.   Yes ProviderMaulik MD   diphenhydrAMINE (BENADRYL) 25 mg capsule Take 1 capsule by mouth Every 6 (Six) Hours As Needed for Allergies. 2/6/19   Jeniffer Rosa APRN   hydrocortisone 1 % cream Apply  topically to the appropriate area as directed 2 (Two) Times a Day. Do not apply longer than 2 weeks 5/28/19   Marques Pereira MD   nitrofurantoin, macrocrystal-monohydrate, (MACROBID) 100 MG capsule Take 1 capsule by mouth 2 (Two) Times a Day. 7/9/19   Amrik Danielle MD       Past Medical History: Past Medical History:   Diagnosis Date   • Anxiety    • Asthma    • Diabetes mellitus (CMS/HCC)       Past Surgical History Past Surgical History:   Procedure Laterality Date   • ANKLE SURGERY Left 2010      Family History: History reviewed. No pertinent family history.   Social History:  reports that she has been smoking cigarettes.  She has been smoking about 1.00 pack per  day. She has never used smokeless tobacco.   reports that she does not drink alcohol.   reports that she does not use drugs.        Review of Systems                                                                                                                  Neuro no history of brain tumor    HENT no history of ear tumors    Eye no history of retinal tumors    Pulmonary no history of lung tumors    Cardiac no history of cardiac tumors    GI: No history of small bowel tumors    Musculoskeletal: No history of skeletal muscle tumors    Endocrine: No history of adrenal tumors    Lymphatic: No history of Hodgkin's disease    Renal: No history of renal cancer      Objective       Vital Signs Range for the last 24 hours  Temperature: Temp:  [98.4 °F (36.9 °C)] 98.4 °F (36.9 °C)   Temp Source: Temp src: Oral   BP: BP: (142)/(70) 142/70   Pulse: Heart Rate:  [99] 99   Respirations: Resp:  [18] 18   SPO2: SpO2:  [98 %] 98 %   O2 Amount (l/min):     O2 Devices Device (Oxygen Therapy): room air   Weight:         OBGyn Exam  Constitutional: Appears to be in no acute distress; Eyes: sclera normal; Endocrine system: thyroid palpate is normal; Pulmonary system: lungs clear; Cardiovascular system: heart regular rate and rhythm; Gastrointestinal system: abdomen soft nontender, active bowel sounds; Urologic system: CVA negative; Psychiatric: appropriate insight; Neurologic: gait within normal limit      Last Labs  Lab Results   Component Value Date    WBC 14.78 (H) 06/06/2019    RBC 4.14 06/06/2019    HGB 12.4 06/06/2019    HCT 36.4 06/06/2019    MCV 87.9 06/06/2019    MCH 30.0 06/06/2019    MCHC 34.1 06/06/2019    RDW 13.1 06/06/2019    RDWSD 41.9 06/06/2019    MPV 10.2 06/06/2019     06/06/2019        Lab Results   Component Value Date    GLUCOSE 189 (H) 05/03/2019    BUN 5 (L) 05/03/2019    CREATININE 0.54 (L) 05/03/2019     05/03/2019    K 3.9 05/03/2019     05/03/2019    CO2 20.5 (L) 05/03/2019    CALCIUM  9.5 2019    PROTEINTOT 6.6 2019    ALBUMIN 3.90 2019    ALT 14 2019    AST 13 2019    ALKPHOS 73 2019    BILITOT 0.2 2019    EGFRIFNONA 135 2019    GLOB 2.7 2019    AGRATIO 1.4 2019    BCR 9.3 2019    ANIONGAP 11.5 2019       Lab Results   Component Value Date    HCGQUAL Positive (A) 2018         Assessment/Plan:  1. 29 y.o. [id number].  Nausea and vomiting about 29-6/7 weeks pregnant pregnancy complicated by type 2 diabetes on metformin and insulin I advised her to discontinue her Metformin until she is feeling better because of risk of lactic acidosis  2. UTI urine is positive for nitrites so she probably does have a urinary tract infection culture is pending.  Care everywhere indicates prior UTI Macrobid is been called in pending culture  3.  [facility]                 This document has been electronically signed by Amrik Danielle MD on 2019 7:01 PM

## 2019-08-09 ENCOUNTER — HOSPITAL ENCOUNTER (OUTPATIENT)
Facility: HOSPITAL | Age: 29
Discharge: HOME | End: 2019-08-09
Attending: OBSTETRICS & GYNECOLOGY | Admitting: OBSTETRICS & GYNECOLOGY

## 2019-08-09 VITALS
RESPIRATION RATE: 18 BRPM | WEIGHT: 218 LBS | HEART RATE: 95 BPM | TEMPERATURE: 98.3 F | OXYGEN SATURATION: 97 % | HEIGHT: 63 IN | DIASTOLIC BLOOD PRESSURE: 63 MMHG | SYSTOLIC BLOOD PRESSURE: 126 MMHG | BODY MASS INDEX: 38.62 KG/M2

## 2019-08-09 LAB
BLOODY SPECIMEN?: NO
FIBRONECTIN FETAL VAG QL: NEGATIVE

## 2019-08-09 PROCEDURE — 59025 FETAL NON-STRESS TEST: CPT

## 2019-08-09 PROCEDURE — 59025 FETAL NON-STRESS TEST: CPT | Performed by: OBSTETRICS & GYNECOLOGY

## 2019-08-09 PROCEDURE — G0463 HOSPITAL OUTPT CLINIC VISIT: HCPCS

## 2019-08-09 PROCEDURE — 82731 ASSAY OF FETAL FIBRONECTIN: CPT | Performed by: OBSTETRICS & GYNECOLOGY

## 2019-08-09 NOTE — DISCHARGE INSTRUCTIONS
Please return for more than 6 contractions in a 1 hour period, not relieved by hydration, soaking in a warm bath and lying on your side. Return for leaking of fluid or bright red vaginal bleeding. Return for decreased or absence of fetal movement. Drink 8-10 glasses of water a day. Eat small frequent meals.

## 2019-08-09 NOTE — NURSING NOTE
Dr. Danielle notified of patient arrival and complaint of contractions since last night, pt IDM during pregnancy & recently finished Macrobid for a UTI.     FFN collected. SVE finger tip, unchanged from documentation on 6/6/2019. Category 1 tracing noted.     Order to recheck cervix 2 hours from previous

## 2019-08-09 NOTE — NON STRESS TEST
Veena Buck, a  at 34w2d with an JOSEF of 2019, Date entered prior to episode creation, was seen at [facility] DEL[facility]ERY for a nonstress test.    Chief Complaint   Patient presents with   • Contractions     since yesterday afternoon   • other     reports normal fetal movement. denies leaking or bleeding. denies n/v. denies visual changes.        Patient Active Problem List   Diagnosis   • Tobacco use   • Type 2 diabetes mellitus without complication, without long-term current use of insulin (CMS/[facility])   • BMI 37.0-37.9, adult   • ARIADNA (generalized anxiety disorder)   • Panic anxiety syndrome   • 12 weeks gestation of pregnancy   • Pregnancy with 10 completed weeks gestation   • 20 weeks gestation of pregnancy   • 26 weeks gestation of pregnancy       Start Time: 1020  Stop Time: 1245    Interpretation A  Nonstress Test Interpretation A: Reactive (19 1245 : Jose Davison, RN)  Comments A: reviewed with DOLORES Rubi RN  (19 1245 : Jose Davison, RN)    FFN negative. SVE finger tip, unchanged on repeat exam. Ready for discharge.

## 2019-10-28 ENCOUNTER — OFFICE VISIT (OUTPATIENT)
Dept: ENDOCRINOLOGY | Facility: CLINIC | Age: 29
End: 2019-10-28

## 2019-10-28 VITALS
BODY MASS INDEX: 35.12 KG/M2 | OXYGEN SATURATION: 96 % | HEIGHT: 63 IN | SYSTOLIC BLOOD PRESSURE: 124 MMHG | HEART RATE: 95 BPM | DIASTOLIC BLOOD PRESSURE: 68 MMHG | WEIGHT: 198.2 LBS

## 2019-10-28 DIAGNOSIS — E11.9 TYPE 2 DIABETES MELLITUS WITHOUT COMPLICATION, WITHOUT LONG-TERM CURRENT USE OF INSULIN (HCC): Primary | Chronic | ICD-10-CM

## 2019-10-28 LAB
GLUCOSE BLDC GLUCOMTR-MCNC: 210 MG/DL (ref 70–130)
HBA1C MFR BLD: 5.7 %

## 2019-10-28 PROCEDURE — 83036 HEMOGLOBIN GLYCOSYLATED A1C: CPT | Performed by: INTERNAL MEDICINE

## 2019-10-28 PROCEDURE — 99214 OFFICE O/P EST MOD 30 MIN: CPT | Performed by: INTERNAL MEDICINE

## 2019-10-28 PROCEDURE — 82962 GLUCOSE BLOOD TEST: CPT | Performed by: INTERNAL MEDICINE

## 2019-10-28 RX ORDER — INSULIN LISPRO 100 [IU]/ML
INJECTION, SOLUTION INTRAVENOUS; SUBCUTANEOUS
Qty: 5 PEN | Refills: 11 | Status: SHIPPED | OUTPATIENT
Start: 2019-10-28 | End: 2019-10-29 | Stop reason: CLARIF

## 2019-10-28 RX ORDER — INSULIN GLARGINE 100 [IU]/ML
INJECTION, SOLUTION SUBCUTANEOUS
Qty: 3 PEN | Refills: 11 | Status: SHIPPED | OUTPATIENT
Start: 2019-10-28 | End: 2020-01-31

## 2019-10-28 RX ORDER — METFORMIN HYDROCHLORIDE 750 MG/1
750 TABLET, EXTENDED RELEASE ORAL
Qty: 30 TABLET | Refills: 11 | Status: SHIPPED | OUTPATIENT
Start: 2019-10-28 | End: 2020-01-31

## 2019-10-28 RX ORDER — NAPROXEN SODIUM 220 MG
TABLET ORAL
Qty: 120 EACH | Refills: 11 | Status: SHIPPED | OUTPATIENT
Start: 2019-10-28

## 2019-10-28 NOTE — PROGRESS NOTES
"  Subjective    Veena Buck is a 29 y.o. female. she is here today for follow-up.    Diabetes   Pertinent negatives for hypoglycemia include no confusion, dizziness, headaches, pallor or tremors. Pertinent negatives for diabetes include no chest pain, no fatigue, no polydipsia, no polyphagia, no polyuria and no weakness.            Primary Care Provider     Jr Prieto MD     Duration Dx approx at age 26 years old     Patient is currently 26 weeks      Timing - Diabetes is Constant     Quality -  well controlled     Severity -  moderate     Complications - none     Current symptoms/problems  increase appetite, polydipsia and polyuria      Alleviating Factors: Compliance       Aggravating Factors :  None     Side Effects  none     Current diet  in general, a \"healthy\" diet       Current exercise walking     Current monitoring regimen: home blood tests - checking 2 x daily       Lab Results   Component Value Date    HGBA1C 5.6 2019                   Home blood sugar records: 100s     Am fasting 90 up to 110       at lunch time 78            Hypoglycemia none           The following portions of the patient's history were reviewed and updated as appropriate:   Past Medical History:   Diagnosis Date   • Anxiety    • Asthma    • Diabetes mellitus (CMS/[facility])     Type 2   • Gestational diabetes     controlled with Insulin      Past Surgical History:   Procedure Laterality Date   • ANKLE SURGERY Left    • WISDOM TOOTH EXTRACTION       No family history on file.  OB History      Para Term  AB Living    1              SAB TAB Ectopic Molar Multiple Live Births                       Current Outpatient Medications   Medication Sig Dispense Refill   • Blood Glucose Monitoring Suppl (ONE TOUCH ULTRA 2) w/Device kit USE TO TEST SUGAR 4 TIMES DAILY.  Dx-e11.9 150 each 11   • glucose blood test strip Test 4 times daily DX E11.9 100 each 11   • hydrocortisone 1 % cream Apply  topically to the " appropriate area as directed 2 (Two) Times a Day. Do not apply longer than 2 weeks 20 g 0   • Insulin Pen Needle (B-D UF III MINI PEN NEEDLES) 31G X 5 MM misc Use 4 times daily  , ICD10 code is E11.9 120 each 11   • Lancets misc Use to test sugar 4 times daily to go with meter that is covered by insurance 200 each 11   • sertraline (ZOLOFT) 50 MG tablet Take 1 tablet by mouth Daily. 90 tablet 2   • albuterol sulfate  (90 Base) MCG/ACT inhaler Inhale 2 puffs Every 4 (Four) Hours As Needed for Wheezing. 1 inhaler 12   • [facility] ASPIRIN ADULT LOW DOSE 81 MG chewable tablet Chew 81 mg 2 (Two) Times a Day.  5   • diphenhydrAMINE (BENADRYL) 25 mg capsule Take 1 capsule by mouth Every 6 (Six) Hours As Needed for Allergies. 30 capsule 0   • Insulin Glargine (BASAGLAR KWIKPEN) 100 UNIT/ML injection pen Start 15 units qhs but may up titrate up to 40 units qhs 12 pen 11   • insulin lispro (ADMELOG) 100 UNIT/ML injection Inject 2 up to 20 units tid (uses sliding scale) 5 each 5   • metFORMIN ER (GLUCOPHAGE-XR) 750 MG 24 hr tablet Take 1 tablet by mouth Daily With Breakfast. 90 tablet 3   • nitrofurantoin, macrocrystal-monohydrate, (MACROBID) 100 MG capsule Take 1 capsule by mouth 2 (Two) Times a Day. 6 capsule 0   • Prenatal Vit-Fe Fumarate-FA (PRENATAL, CLASSIC, VITAMIN) 28-0.8 MG tablet tablet Take 1 tablet by mouth Daily.     • promethazine (PHENERGAN) 25 MG tablet Take 1 tablet by mouth Every 6 (Six) Hours As Needed for Nausea or Vomiting. 20 tablet 0   • raNITIdine (ZANTAC) 150 MG tablet Take 150 mg by mouth 2 (Two) Times a Day.     • vitamin B-6 (PYRIDOXINE) 50 MG tablet Take 25 mg by mouth Daily.       No current facility-administered medications for this visit.      No Known Allergies  Social History     Socioeconomic History   • Marital status:      Spouse name: Not on file   • Number of children: Not on file   • Years of education: Not on file   • Highest education level: Not on file   Tobacco Use   •  "Smoking status: Current Every Day Smoker     Packs/day: 1.00     Types: Cigarettes   • Smokeless tobacco: Never Used   • Tobacco comment: 1*800*quit*now   Substance and Sexual Activity   • Alcohol use: No   • Drug use: No   • Sexual activity: Yes     Partners: Male     Birth control/protection: None       Review of Systems  Review of Systems   Constitutional: Negative for activity change, appetite change, diaphoresis and fatigue.   HENT: Negative for facial swelling, sneezing, sore throat, tinnitus, trouble swallowing and voice change.    Eyes: Negative for photophobia, pain, discharge, redness, itching and visual disturbance.   Respiratory: Negative for apnea, cough, choking, chest tightness and shortness of breath.    Cardiovascular: Negative for chest pain, palpitations and leg swelling.   Gastrointestinal: Negative for abdominal distention, abdominal pain, constipation, diarrhea, nausea and vomiting.   Endocrine: Negative for cold intolerance, heat intolerance, polydipsia, polyphagia and polyuria.   Genitourinary: Negative for difficulty urinating, dysuria, frequency, hematuria and urgency.   Musculoskeletal: Negative for arthralgias, back pain, gait problem, joint swelling, myalgias, neck pain and neck stiffness.   Skin: Negative for color change, pallor, rash and wound.   Neurological: Negative for dizziness, tremors, weakness, light-headedness, numbness and headaches.   Hematological: Negative for adenopathy. Does not bruise/bleed easily.   Psychiatric/Behavioral: Negative for behavioral problems, confusion and sleep disturbance.        Objective    /68   Pulse 95   Ht 160 cm (63\")   Wt 89.9 kg (198 lb 3.2 oz)   SpO2 96%   BMI 35.11 kg/m²   Physical Exam   Constitutional: She is oriented to person, place, and time. She appears well-developed and well-nourished. No distress.   HENT:   Head: Normocephalic and atraumatic.   Right Ear: External ear normal.   Left Ear: External ear normal.   Nose: Nose " normal.   Eyes: Conjunctivae and EOM are normal. Pupils are equal, round, and reactive to light.   Neck: Normal range of motion. Neck supple. No tracheal deviation present. No thyromegaly present.   Cardiovascular: Normal rate, regular rhythm and normal heart sounds.   No murmur heard.  Pulmonary/Chest: Effort normal and breath sounds normal. No respiratory distress. She has no wheezes.   Abdominal: Soft. Bowel sounds are normal. There is no tenderness. There is no rebound and no guarding.   Musculoskeletal: Normal range of motion. She exhibits no edema, tenderness or deformity.   Neurological: She is alert and oriented to person, place, and time. No cranial nerve deficit.   Skin: Skin is warm and dry. No rash noted.   Psychiatric: She has a normal mood and affect. Her behavior is normal. Judgment and thought content normal.       Lab Review  Glucose (mg/dL)   Date Value   05/03/2019 189 (H)   06/26/2018 309 (H)   06/30/2017 277 (H)     Sodium (mmol/L)   Date Value   05/03/2019 136   06/26/2018 136 (L)   06/30/2017 139     Potassium (mmol/L)   Date Value   05/03/2019 3.9   06/26/2018 4.0   06/30/2017 4.3     Chloride (mmol/L)   Date Value   05/03/2019 104   06/26/2018 97   06/30/2017 97     CO2 (mmol/L)   Date Value   05/03/2019 20.5 (L)   06/26/2018 28.0   06/30/2017 28.0     BUN (mg/dL)   Date Value   05/03/2019 5 (L)   06/26/2018 6 (L)   06/30/2017 5 (L)     Creatinine (mg/dL)   Date Value   05/03/2019 0.54 (L)   06/26/2018 0.60   06/30/2017 0.60     Hemoglobin A1C (%)   Date Value   06/26/2019 5.6   05/03/2019 5.70 (H)   03/15/2019 6.1   10/30/2018 10.3 (H)   07/05/2018 8.9 (H)   03/15/2018 10.4 (H)     Triglycerides (mg/dL)   Date Value   06/30/2017 388 (H)     LDL Cholesterol  (mg/dL)   Date Value   06/30/2017 117       Assessment/Plan      1. Type 2 diabetes mellitus without complication, without long-term current use of insulin (CMS/[facility])    .    Medications prescribed:  Outpatient Encounter Medications as of  10/28/2019   Medication Sig Dispense Refill   • Blood Glucose Monitoring Suppl (ONE TOUCH ULTRA 2) w/Device kit USE TO TEST SUGAR 4 TIMES DAILY.  Dx-e11.9 150 each 11   • glucose blood test strip Test 4 times daily DX E11.9 100 each 11   • hydrocortisone 1 % cream Apply  topically to the appropriate area as directed 2 (Two) Times a Day. Do not apply longer than 2 weeks 20 g 0   • Insulin Pen Needle (B-D UF III MINI PEN NEEDLES) 31G X 5 MM misc Use 4 times daily  , ICD10 code is E11.9 120 each 11   • Lancets misc Use to test sugar 4 times daily to go with meter that is covered by insurance 200 each 11   • sertraline (ZOLOFT) 50 MG tablet Take 1 tablet by mouth Daily. 90 tablet 2   • albuterol sulfate  (90 Base) MCG/ACT inhaler Inhale 2 puffs Every 4 (Four) Hours As Needed for Wheezing. 1 inhaler 12   • [facility] ASPIRIN ADULT LOW DOSE 81 MG chewable tablet Chew 81 mg 2 (Two) Times a Day.  5   • diphenhydrAMINE (BENADRYL) 25 mg capsule Take 1 capsule by mouth Every 6 (Six) Hours As Needed for Allergies. 30 capsule 0   • Insulin Glargine (BASAGLAR KWIKPEN) 100 UNIT/ML injection pen Start 15 units qhs but may up titrate up to 40 units qhs 12 pen 11   • insulin lispro (ADMELOG) 100 UNIT/ML injection Inject 2 up to 20 units tid (uses sliding scale) 5 each 5   • metFORMIN ER (GLUCOPHAGE-XR) 750 MG 24 hr tablet Take 1 tablet by mouth Daily With Breakfast. 90 tablet 3   • nitrofurantoin, macrocrystal-monohydrate, (MACROBID) 100 MG capsule Take 1 capsule by mouth 2 (Two) Times a Day. 6 capsule 0   • Prenatal Vit-Fe Fumarate-FA (PRENATAL, CLASSIC, VITAMIN) 28-0.8 MG tablet tablet Take 1 tablet by mouth Daily.     • promethazine (PHENERGAN) 25 MG tablet Take 1 tablet by mouth Every 6 (Six) Hours As Needed for Nausea or Vomiting. 20 tablet 0   • raNITIdine (ZANTAC) 150 MG tablet Take 150 mg by mouth 2 (Two) Times a Day.     • vitamin B-6 (PYRIDOXINE) 50 MG tablet Take 25 mg by mouth Daily.       No facility-administered  encounter medications on file as of 10/28/2019.        Orders placed during this encounter include:  Orders Placed This Encounter   Procedures   • POC Glycosylated Hemoglobin (Hb A1C)   • POC Glucose     Pt has type 2 diabetes with hyperglycemia, delivered 6 weeks ago      Glycemic Management:       Lab Results   Component Value Date    HGBA1C 5.6 06/26/2019       Postpartum    Metformin , side effects at 500 bid but 750 mg once daily she is ok     Start trulicity 0.75 mg weekly      Start steglatro 5 mg daily            Basaglar  ( lantus - levemir ) --- 26 , stop        =========================================     Mealtime Insulin ( admelog - novolog - humalog - apidra )    Admelog     2 units per 15 grams of CHO            --     This is addition to a sliding scale      120-160 : 2 units  161-200 : 4units  201-240 : 6units  Above 240 : 8units      Stop , use only sliding scale                  ===                              Lab Results   Component Value Date     TSH 1.840 07/05/2018            Preventive care:     Smoking     I advised Veena of the risks of continuing to use tobacco, and I provided her with tobacco cessation educational materials in the After Visit Summary.      During this visit, I spent less than 3  minutes counseling the patient regarding tobacco cessation.           Weight management:      Patient's Body mass index is 35.11 kg/m². BMI is above normal parameters. Recommendations include: educational material.           4. Follow-up: No Follow-up on file.

## 2020-01-31 ENCOUNTER — OFFICE VISIT (OUTPATIENT)
Dept: ENDOCRINOLOGY | Facility: CLINIC | Age: 30
End: 2020-01-31

## 2020-01-31 VITALS
WEIGHT: 199.4 LBS | BODY MASS INDEX: 35.33 KG/M2 | SYSTOLIC BLOOD PRESSURE: 128 MMHG | HEIGHT: 63 IN | OXYGEN SATURATION: 98 % | DIASTOLIC BLOOD PRESSURE: 78 MMHG | HEART RATE: 67 BPM

## 2020-01-31 DIAGNOSIS — E11.9 TYPE 2 DIABETES MELLITUS WITHOUT COMPLICATION, WITHOUT LONG-TERM CURRENT USE OF INSULIN (HCC): Primary | ICD-10-CM

## 2020-01-31 LAB — HBA1C MFR BLD: 6.2 %

## 2020-01-31 PROCEDURE — 99214 OFFICE O/P EST MOD 30 MIN: CPT | Performed by: INTERNAL MEDICINE

## 2020-01-31 PROCEDURE — 83036 HEMOGLOBIN GLYCOSYLATED A1C: CPT | Performed by: INTERNAL MEDICINE

## 2020-01-31 RX ORDER — MEDROXYPROGESTERONE ACETATE 150 MG/ML
INJECTION, SUSPENSION INTRAMUSCULAR
Status: ON HOLD | COMMUNITY
Start: 2020-01-16 | End: 2021-10-02

## 2020-01-31 RX ORDER — PROPRANOLOL HYDROCHLORIDE 20 MG/1
20 TABLET ORAL 3 TIMES DAILY PRN
COMMUNITY
Start: 2020-01-22

## 2020-01-31 NOTE — PROGRESS NOTES
"  Subjective    Veena Buck is a 29 y.o. female. she is here today for follow-up.    Diabetes   Pertinent negatives for hypoglycemia include no confusion, dizziness, headaches, pallor or tremors. Pertinent negatives for diabetes include no chest pain, no fatigue, no polydipsia, no polyphagia, no polyuria and no weakness.            Primary Care Provider     Jr Prieto MD     Duration Dx approx at age 26 years old     Patient is currently 26 weeks      Timing - Diabetes is Constant     Quality -  well controlled     Severity -  moderate     Complications - none     Current symptoms/problems  increase appetite, polydipsia and polyuria      Alleviating Factors: Compliance       Aggravating Factors :  None     Side Effects  none     Current diet  in general, a \"healthy\" diet       Current exercise walking     Current monitoring regimen: home blood tests - checking 2 x daily       Lab Results   Component Value Date    HGBA1C 5.7 10/28/2019                   Home blood sugar records: 100s     Am fasting 90 up to 110       at lunch time 78            Hypoglycemia none           The following portions of the patient's history were reviewed and updated as appropriate:   Past Medical History:   Diagnosis Date   • Anxiety    • Asthma    • Diabetes mellitus (CMS/[facility])     Type 2   • Gestational diabetes     controlled with Insulin      Past Surgical History:   Procedure Laterality Date   • ANKLE SURGERY Left    • WISDOM TOOTH EXTRACTION       No family history on file.  OB History        1    Para        Term                AB        Living           SAB        TAB        Ectopic        Molar        Multiple        Live Births                  Current Outpatient Medications   Medication Sig Dispense Refill   • ADMELOG 100 UNIT/ML injection Inject up to 20 units 3 times daily with meals 20 mL 5   • albuterol sulfate  (90 Base) MCG/ACT inhaler Inhale 2 puffs Every 4 (Four) Hours As Needed for " "Wheezing. 1 inhaler 12   • Blood Glucose Monitoring Suppl (ONE TOUCH ULTRA 2) w/Device kit USE TO TEST SUGAR 4 TIMES DAILY.  Dx-e11.9 150 each 11   • [facility] ASPIRIN ADULT LOW DOSE 81 MG chewable tablet Chew 81 mg 2 (Two) Times a Day.  5   • diphenhydrAMINE (BENADRYL) 25 mg capsule Take 1 capsule by mouth Every 6 (Six) Hours As Needed for Allergies. 30 capsule 0   • Ertugliflozin L-PyroglutamicAc (STEGLATRO) 5 MG tablet Take 1 tablet by mouth Every Morning. 30 tablet 11   • glucose blood test strip Test 4 times daily DX E11.9 100 each 11   • hydrocortisone 1 % cream Apply  topically to the appropriate area as directed 2 (Two) Times a Day. Do not apply longer than 2 weeks 20 g 0   • Insulin Glargine (BASAGLAR KWIKPEN) 100 UNIT/ML injection pen Up to 30 units qhs 3 pen 11   • Insulin Pen Needle (B-D UF III MINI PEN NEEDLES) 31G X 5 MM misc Use 4 times daily  , ICD10 code is E11.9 120 each 11   • Insulin Syringe 31G X 5/16\" 0.5 ML misc Use 4 x daily 120 each 11   • Lancets misc Use to test sugar 4 times daily to go with meter that is covered by insurance 200 each 11   • medroxyPROGESTERone (DEPO-PROVERA) 150 MG/ML injection      • metFORMIN ER (GLUCOPHAGE XR) 750 MG 24 hr tablet Take 1 tablet by mouth Daily With Breakfast. 30 tablet 11   • Prenatal Vit-Fe Fumarate-FA (PRENATAL, CLASSIC, VITAMIN) 28-0.8 MG tablet tablet Take 1 tablet by mouth Daily.     • propranolol (INDERAL) 20 MG tablet      • raNITIdine (ZANTAC) 150 MG tablet Take 150 mg by mouth 2 (Two) Times a Day.     • Semaglutide,0.25 or 0.5MG/DOS, (OZEMPIC, 0.25 OR 0.5 MG/DOSE,) 2 MG/1.5ML solution pen-injector Inject 0.5 mg under the skin into the appropriate area as directed 1 (One) Time Per Week. 0.5 mg weekly 1 pen 11   • sertraline (ZOLOFT) 100 MG tablet      • sertraline (ZOLOFT) 50 MG tablet Take 1 tablet by mouth Daily. 90 tablet 2   • vitamin B-6 (PYRIDOXINE) 50 MG tablet Take 25 mg by mouth Daily.       No current facility-administered medications for " "this visit.      No Known Allergies  Social History     Socioeconomic History   • Marital status:      Spouse name: Not on file   • Number of children: Not on file   • Years of education: Not on file   • Highest education level: Not on file   Tobacco Use   • Smoking status: Current Every Day Smoker     Packs/day: 1.00     Types: Cigarettes   • Smokeless tobacco: Never Used   • Tobacco comment: 1*800*quit*now   Substance and Sexual Activity   • Alcohol use: No   • Drug use: No   • Sexual activity: Yes     Partners: Male     Birth control/protection: None       Review of Systems  Review of Systems   Constitutional: Negative for activity change, appetite change, diaphoresis and fatigue.   HENT: Negative for facial swelling, sneezing, sore throat, tinnitus, trouble swallowing and voice change.    Eyes: Negative for photophobia, pain, discharge, redness, itching and visual disturbance.   Respiratory: Negative for apnea, cough, choking, chest tightness and shortness of breath.    Cardiovascular: Negative for chest pain, palpitations and leg swelling.   Gastrointestinal: Negative for abdominal distention, abdominal pain, constipation, diarrhea, nausea and vomiting.   Endocrine: Negative for cold intolerance, heat intolerance, polydipsia, polyphagia and polyuria.   Genitourinary: Negative for difficulty urinating, dysuria, frequency, hematuria and urgency.   Musculoskeletal: Negative for arthralgias, back pain, gait problem, joint swelling, myalgias, neck pain and neck stiffness.   Skin: Negative for color change, pallor, rash and wound.   Neurological: Negative for dizziness, tremors, weakness, light-headedness, numbness and headaches.   Hematological: Negative for adenopathy. Does not bruise/bleed easily.   Psychiatric/Behavioral: Negative for behavioral problems, confusion and sleep disturbance.        Objective    /78   Pulse 67   Ht 160 cm (63\")   Wt 90.4 kg (199 lb 6.4 oz)   SpO2 98%   BMI 35.32 " kg/m²   Physical Exam   Constitutional: She is oriented to person, place, and time. She appears well-developed and well-nourished. No distress.   HENT:   Head: Normocephalic and atraumatic.   Right Ear: External ear normal.   Left Ear: External ear normal.   Nose: Nose normal.   Eyes: Pupils are equal, round, and reactive to light. Conjunctivae and EOM are normal.   Neck: Normal range of motion. Neck supple. No tracheal deviation present. No thyromegaly present.   Cardiovascular: Normal rate, regular rhythm and normal heart sounds.   No murmur heard.  Pulmonary/Chest: Effort normal and breath sounds normal. No respiratory distress. She has no wheezes.   Abdominal: Soft. Bowel sounds are normal. There is no tenderness. There is no rebound and no guarding.   Musculoskeletal: Normal range of motion. She exhibits no edema, tenderness or deformity.   Neurological: She is alert and oriented to person, place, and time. No cranial nerve deficit.   Skin: Skin is warm and dry. No rash noted.   Psychiatric: She has a normal mood and affect. Her behavior is normal. Judgment and thought content normal.       Lab Review  Glucose (mg/dL)   Date Value   05/03/2019 189 (H)   06/26/2018 309 (H)   06/30/2017 277 (H)     Sodium (mmol/L)   Date Value   05/03/2019 136   06/26/2018 136 (L)   06/30/2017 139     Potassium (mmol/L)   Date Value   05/03/2019 3.9   06/26/2018 4.0   06/30/2017 4.3     Chloride (mmol/L)   Date Value   05/03/2019 104   06/26/2018 97   06/30/2017 97     CO2 (mmol/L)   Date Value   05/03/2019 20.5 (L)   06/26/2018 28.0   06/30/2017 28.0     BUN (mg/dL)   Date Value   05/03/2019 5 (L)   06/26/2018 6 (L)   06/30/2017 5 (L)     Creatinine (mg/dL)   Date Value   05/03/2019 0.54 (L)   06/26/2018 0.60   06/30/2017 0.60     Hemoglobin A1C (%)   Date Value   10/28/2019 5.7   06/26/2019 5.6   05/03/2019 5.70 (H)   03/15/2019 6.1   01/29/2019 7.9 (H)     Triglycerides (mg/dL)   Date Value   06/30/2017 388 (H)     LDL  "Cholesterol  (mg/dL)   Date Value   06/30/2017 117       Assessment/Plan      1. Type 2 diabetes mellitus without complication, without long-term current use of insulin (CMS/[facility])    .    Medications prescribed:  Outpatient Encounter Medications as of 1/31/2020   Medication Sig Dispense Refill   • ADMELOG 100 UNIT/ML injection Inject up to 20 units 3 times daily with meals 20 mL 5   • albuterol sulfate  (90 Base) MCG/ACT inhaler Inhale 2 puffs Every 4 (Four) Hours As Needed for Wheezing. 1 inhaler 12   • Blood Glucose Monitoring Suppl (ONE TOUCH ULTRA 2) w/Device kit USE TO TEST SUGAR 4 TIMES DAILY.  Dx-e11.9 150 each 11   • [facility] ASPIRIN ADULT LOW DOSE 81 MG chewable tablet Chew 81 mg 2 (Two) Times a Day.  5   • diphenhydrAMINE (BENADRYL) 25 mg capsule Take 1 capsule by mouth Every 6 (Six) Hours As Needed for Allergies. 30 capsule 0   • Ertugliflozin L-PyroglutamicAc (STEGLATRO) 5 MG tablet Take 1 tablet by mouth Every Morning. 30 tablet 11   • glucose blood test strip Test 4 times daily DX E11.9 100 each 11   • hydrocortisone 1 % cream Apply  topically to the appropriate area as directed 2 (Two) Times a Day. Do not apply longer than 2 weeks 20 g 0   • Insulin Glargine (BASAGLAR KWIKPEN) 100 UNIT/ML injection pen Up to 30 units qhs 3 pen 11   • Insulin Pen Needle (B-D UF III MINI PEN NEEDLES) 31G X 5 MM misc Use 4 times daily  , ICD10 code is E11.9 120 each 11   • Insulin Syringe 31G X 5/16\" 0.5 ML misc Use 4 x daily 120 each 11   • Lancets misc Use to test sugar 4 times daily to go with meter that is covered by insurance 200 each 11   • medroxyPROGESTERone (DEPO-PROVERA) 150 MG/ML injection      • metFORMIN ER (GLUCOPHAGE XR) 750 MG 24 hr tablet Take 1 tablet by mouth Daily With Breakfast. 30 tablet 11   • Prenatal Vit-Fe Fumarate-FA (PRENATAL, CLASSIC, VITAMIN) 28-0.8 MG tablet tablet Take 1 tablet by mouth Daily.     • propranolol (INDERAL) 20 MG tablet      • raNITIdine (ZANTAC) 150 MG tablet Take 150 mg by " mouth 2 (Two) Times a Day.     • Semaglutide,0.25 or 0.5MG/DOS, (OZEMPIC, 0.25 OR 0.5 MG/DOSE,) 2 MG/1.5ML solution pen-injector Inject 0.5 mg under the skin into the appropriate area as directed 1 (One) Time Per Week. 0.5 mg weekly 1 pen 11   • sertraline (ZOLOFT) 100 MG tablet      • sertraline (ZOLOFT) 50 MG tablet Take 1 tablet by mouth Daily. 90 tablet 2   • vitamin B-6 (PYRIDOXINE) 50 MG tablet Take 25 mg by mouth Daily.       No facility-administered encounter medications on file as of 1/31/2020.        Orders placed during this encounter include:  No orders of the defined types were placed in this encounter.    Pt has type 2 diabetes with hyperglycemia, delivered 6 weeks ago      Glycemic Management:       Lab Results   Component Value Date    HGBA1C 5.7 10/28/2019            Metformin , side effects      Continue ozempic and steglatro         Admelog   Only prn sliding scale    120-160 : 2 units  161-200 : 4units  201-240 : 6units  Above 240 : 8units      Stop , use only sliding scale

## 2020-07-30 ENCOUNTER — OFFICE VISIT (OUTPATIENT)
Dept: ENDOCRINOLOGY | Facility: CLINIC | Age: 30
End: 2020-07-30

## 2020-07-30 VITALS
WEIGHT: 211.1 LBS | OXYGEN SATURATION: 98 % | DIASTOLIC BLOOD PRESSURE: 62 MMHG | HEART RATE: 98 BPM | BODY MASS INDEX: 37.4 KG/M2 | SYSTOLIC BLOOD PRESSURE: 100 MMHG | HEIGHT: 63 IN

## 2020-07-30 DIAGNOSIS — E11.9 TYPE 2 DIABETES MELLITUS WITHOUT COMPLICATION, WITHOUT LONG-TERM CURRENT USE OF INSULIN (HCC): Primary | ICD-10-CM

## 2020-07-30 PROCEDURE — 99214 OFFICE O/P EST MOD 30 MIN: CPT | Performed by: INTERNAL MEDICINE

## 2020-07-30 RX ORDER — GLUCOSAMINE HCL/CHONDROITIN SU 500-400 MG
CAPSULE ORAL
Qty: 120 EACH | Refills: 11 | Status: SHIPPED | OUTPATIENT
Start: 2020-07-30

## 2020-07-30 RX ORDER — LANCING DEVICE
EACH MISCELLANEOUS
Qty: 1 EACH | Refills: 11 | Status: SHIPPED | OUTPATIENT
Start: 2020-07-30

## 2020-07-30 RX ORDER — BLOOD-GLUCOSE METER
KIT MISCELLANEOUS
Qty: 120 EACH | Refills: 11 | Status: SHIPPED | OUTPATIENT
Start: 2020-07-30

## 2020-07-30 RX ORDER — INSULIN GLARGINE 100 [IU]/ML
INJECTION, SOLUTION SUBCUTANEOUS
Qty: 2 PEN | Refills: 11 | Status: SHIPPED | OUTPATIENT
Start: 2020-07-30 | End: 2021-12-06

## 2020-07-30 RX ORDER — ISOPROPYL ALCOHOL 0.7 ML/1
SWAB TOPICAL
Qty: 120 EACH | Refills: 11 | Status: SHIPPED | OUTPATIENT
Start: 2020-07-30

## 2020-07-30 RX ORDER — PEN NEEDLE, DIABETIC 30 GX3/16"
1 NEEDLE, DISPOSABLE MISCELLANEOUS 4 TIMES DAILY
Qty: 120 EACH | Refills: 11 | Status: SHIPPED | OUTPATIENT
Start: 2020-07-30

## 2020-07-30 RX ORDER — INSULIN LISPRO 100 U/ML
INJECTION, SOLUTION SUBCUTANEOUS
Qty: 5 PEN | Refills: 11 | Status: SHIPPED | OUTPATIENT
Start: 2020-07-30 | End: 2020-08-03 | Stop reason: CLARIF

## 2020-07-30 NOTE — PATIENT INSTRUCTIONS
Metformin , side effects      Continue ozempic but increase from 0.5 mg weekly to 1 mg weekly     Add steglatro 5 mg daily        Admelog  , not using    If before meal Sliding scale   130-160 : 2 units  161-200 : 4units  201-240 : 6units  Above 240 : 8units         Admelog  If after meal  Sliding scale      180-200 : 4units  201-240 : 6units  Above 240 : 8units      Do either before or after

## 2020-07-30 NOTE — PROGRESS NOTES
"  Subjective    Veena Buck is a 30 y.o. female. she is here today for follow-up.    Diabetes   Pertinent negatives for hypoglycemia include no confusion, dizziness, headaches, pallor or tremors. Pertinent negatives for diabetes include no chest pain, no fatigue, no polydipsia, no polyphagia, no polyuria and no weakness.            Primary Care Provider     Jr Prieto MD     Duration Dx approx at age 26 years old     Patient is currently 26 weeks      Timing - Diabetes is Constant     Quality -  well controlled     Severity -  moderate     Complications - none     Current symptoms/problems  increase appetite, polydipsia and polyuria      Alleviating Factors: Compliance       Aggravating Factors :  None     Side Effects  none     Current diet  in general, a \"healthy\" diet       Current exercise walking     Current monitoring regimen: home blood tests - not checking     Lab Results   Component Value Date    HGBA1C 6.2 2020                        Hypoglycemia none           The following portions of the patient's history were reviewed and updated as appropriate:   Past Medical History:   Diagnosis Date   • Anxiety    • Asthma    • Diabetes mellitus (CMS/[facility])     Type 2   • Gestational diabetes     controlled with Insulin      Past Surgical History:   Procedure Laterality Date   • ANKLE SURGERY Left    • WISDOM TOOTH EXTRACTION       No family history on file.  OB History        1    Para        Term                AB        Living           SAB        TAB        Ectopic        Molar        Multiple        Live Births                  Current Outpatient Medications   Medication Sig Dispense Refill   • albuterol sulfate HFA (Ventolin HFA) 108 (90 Base) MCG/ACT inhaler Inhale 2 puffs Every 4 (Four) Hours As Needed for Wheezing. 1 inhaler 0   • Blood Glucose Monitoring Suppl (ONE TOUCH ULTRA 2) w/Device kit USE TO TEST SUGAR 4 TIMES DAILY.  Dx-e11.9 150 each 11   • glucose blood test " "strip Test 4 times daily DX E11.9 100 each 11   • loratadine (CLARITIN) 10 MG tablet Take 1 tablet by mouth Daily. 30 tablet 0   • medroxyPROGESTERone (DEPO-PROVERA) 150 MG/ML injection      • propranolol (INDERAL) 20 MG tablet      • Semaglutide,0.25 or 0.5MG/DOS, (OZEMPIC, 0.25 OR 0.5 MG/DOSE,) 2 MG/1.5ML solution pen-injector Inject 0.5 mg under the skin into the appropriate area as directed 1 (One) Time Per Week. 0.5 mg weekly 1 pen 11   • sertraline (ZOLOFT) 100 MG tablet      • sertraline (ZOLOFT) 50 MG tablet Take 1 tablet by mouth Daily. 90 tablet 2   • Insulin Pen Needle (B-D UF III MINI PEN NEEDLES) 31G X 5 MM misc Use 4 times daily  , ICD10 code is E11.9 120 each 11   • Insulin Syringe 31G X 5/16\" 0.5 ML misc Use 4 x daily 120 each 11   • Lancets misc Use to test sugar 4 times daily to go with meter that is covered by insurance 200 each 11     No current facility-administered medications for this visit.      No Known Allergies  Social History     Socioeconomic History   • Marital status:      Spouse name: Not on file   • Number of children: Not on file   • Years of education: Not on file   • Highest education level: Not on file   Tobacco Use   • Smoking status: Current Every Day Smoker     Packs/day: 1.00     Types: Cigarettes   • Smokeless tobacco: Never Used   • Tobacco comment: 1*800*quit*now   Substance and Sexual Activity   • Alcohol use: No   • Drug use: No   • Sexual activity: Yes     Partners: Male     Birth control/protection: None       Review of Systems  Review of Systems   Constitutional: Negative for activity change, appetite change, diaphoresis and fatigue.   HENT: Negative for facial swelling, sneezing, sore throat, tinnitus, trouble swallowing and voice change.    Eyes: Negative for photophobia, pain, discharge, redness, itching and visual disturbance.   Respiratory: Negative for apnea, cough, choking, chest tightness and shortness of breath.    Cardiovascular: Negative for chest " "pain, palpitations and leg swelling.   Gastrointestinal: Negative for abdominal distention, abdominal pain, constipation, diarrhea, nausea and vomiting.   Endocrine: Negative for cold intolerance, heat intolerance, polydipsia, polyphagia and polyuria.   Genitourinary: Negative for difficulty urinating, dysuria, frequency, hematuria and urgency.   Musculoskeletal: Negative for arthralgias, back pain, gait problem, joint swelling, myalgias, neck pain and neck stiffness.   Skin: Negative for color change, pallor, rash and wound.   Neurological: Negative for dizziness, tremors, weakness, light-headedness, numbness and headaches.   Hematological: Negative for adenopathy. Does not bruise/bleed easily.   Psychiatric/Behavioral: Negative for behavioral problems, confusion and sleep disturbance.        Objective    /62 (BP Location: Right arm, Patient Position: Sitting, Cuff Size: Adult)   Pulse 98   Ht 160 cm (63\")   Wt 95.8 kg (211 lb 1.6 oz)   SpO2 98%   BMI 37.39 kg/m²   Physical Exam   Constitutional: She is oriented to person, place, and time. She appears well-developed and well-nourished. No distress.   HENT:   Head: Normocephalic and atraumatic.   Right Ear: External ear normal.   Left Ear: External ear normal.   Nose: Nose normal.   Eyes: Pupils are equal, round, and reactive to light. Conjunctivae and EOM are normal.   Neck: Normal range of motion. Neck supple. No tracheal deviation present. No thyromegaly present.   Cardiovascular: Normal rate, regular rhythm and normal heart sounds.   No murmur heard.  Pulmonary/Chest: Effort normal and breath sounds normal. No respiratory distress. She has no wheezes.   Abdominal: Soft. Bowel sounds are normal. There is no tenderness. There is no rebound and no guarding.   Musculoskeletal: Normal range of motion. She exhibits no edema, tenderness or deformity.   Neurological: She is alert and oriented to person, place, and time. No cranial nerve deficit.   Skin: Skin " is warm and dry. No rash noted.   Psychiatric: She has a normal mood and affect. Her behavior is normal. Judgment and thought content normal.       Lab Review  Glucose (mg/dL)   Date Value   05/03/2019 189 (H)   06/26/2018 309 (H)   06/30/2017 277 (H)     Sodium (mmol/L)   Date Value   05/03/2019 136   06/26/2018 136 (L)   06/30/2017 139     Potassium (mmol/L)   Date Value   05/03/2019 3.9   06/26/2018 4.0   06/30/2017 4.3     Chloride (mmol/L)   Date Value   05/03/2019 104   06/26/2018 97   06/30/2017 97     CO2 (mmol/L)   Date Value   05/03/2019 20.5 (L)   06/26/2018 28.0   06/30/2017 28.0     BUN (mg/dL)   Date Value   05/03/2019 5 (L)   06/26/2018 6 (L)   06/30/2017 5 (L)     Creatinine (mg/dL)   Date Value   05/03/2019 0.54 (L)   06/26/2018 0.60   06/30/2017 0.60     Hemoglobin A1C (%)   Date Value   01/31/2020 6.2   10/28/2019 5.7   06/26/2019 5.6   05/03/2019 5.70 (H)   01/29/2019 7.9 (H)     Triglycerides (mg/dL)   Date Value   06/30/2017 388 (H)     LDL Cholesterol  (mg/dL)   Date Value   06/30/2017 117       Assessment/Plan      1. Type 2 diabetes mellitus without complication, without long-term current use of insulin (CMS/[facility])    .    Medications prescribed:  Outpatient Encounter Medications as of 7/30/2020   Medication Sig Dispense Refill   • albuterol sulfate HFA (Ventolin HFA) 108 (90 Base) MCG/ACT inhaler Inhale 2 puffs Every 4 (Four) Hours As Needed for Wheezing. 1 inhaler 0   • Blood Glucose Monitoring Suppl (ONE TOUCH ULTRA 2) w/Device kit USE TO TEST SUGAR 4 TIMES DAILY.  Dx-e11.9 150 each 11   • glucose blood test strip Test 4 times daily DX E11.9 100 each 11   • loratadine (CLARITIN) 10 MG tablet Take 1 tablet by mouth Daily. 30 tablet 0   • medroxyPROGESTERone (DEPO-PROVERA) 150 MG/ML injection      • propranolol (INDERAL) 20 MG tablet      • Semaglutide,0.25 or 0.5MG/DOS, (OZEMPIC, 0.25 OR 0.5 MG/DOSE,) 2 MG/1.5ML solution pen-injector Inject 0.5 mg under the skin into the appropriate area as  "directed 1 (One) Time Per Week. 0.5 mg weekly 1 pen 11   • sertraline (ZOLOFT) 100 MG tablet      • sertraline (ZOLOFT) 50 MG tablet Take 1 tablet by mouth Daily. 90 tablet 2   • Insulin Pen Needle (B-D UF III MINI PEN NEEDLES) 31G X 5 MM misc Use 4 times daily  , ICD10 code is E11.9 120 each 11   • Insulin Syringe 31G X 5/16\" 0.5 ML misc Use 4 x daily 120 each 11   • Lancets misc Use to test sugar 4 times daily to go with meter that is covered by insurance 200 each 11   • [DISCONTINUED] albuterol sulfate  (90 Base) MCG/ACT inhaler Inhale 2 puffs Every 4 (Four) Hours As Needed for Wheezing. 1 inhaler 12   • [DISCONTINUED] hydrocortisone 1 % cream Apply  topically to the appropriate area as directed 2 (Two) Times a Day. Do not apply longer than 2 weeks 20 g 0     No facility-administered encounter medications on file as of 7/30/2020.        Orders placed during this encounter include:  No orders of the defined types were placed in this encounter.    Pt has type 2 diabetes with hyperglycemia, delivered 6 weeks ago      Glycemic Management:       Lab Results   Component Value Date    HGBA1C 6.2 01/31/2020            Metformin , side effects      Continue ozempic but increase from 0.5 mg weekly to 1 mg weekly     Add steglatro 5 mg daily        Admelog  , not using    If before meal Sliding scale   130-160 : 2 units  161-200 : 4units  201-240 : 6units  Above 240 : 8units         Admelog  If after meal  Sliding scale      180-200 : 4units  201-240 : 6units  Above 240 : 8units      Do either before or after        add basaglar 15 at night                        "

## 2020-08-04 ENCOUNTER — TELEPHONE (OUTPATIENT)
Dept: ENDOCRINOLOGY | Facility: CLINIC | Age: 30
End: 2020-08-04

## 2020-08-04 RX ORDER — INSULIN ASPART INJECTION 100 [IU]/ML
INJECTION, SOLUTION SUBCUTANEOUS
Qty: 6 PEN | Refills: 6 | Status: SHIPPED | OUTPATIENT
Start: 2020-08-04 | End: 2020-08-05 | Stop reason: CLARIF

## 2020-08-04 NOTE — TELEPHONE ENCOUNTER
Pt called stating that her insurance won't cover Ademlog and she is needing an alternative, her callback number is [phone].  She uses [email or web address] in [place].

## 2020-08-05 RX ORDER — INSULIN LISPRO 100 U/ML
INJECTION, SOLUTION SUBCUTANEOUS
Qty: 6 PEN | Refills: 6 | Status: SHIPPED | OUTPATIENT
Start: 2020-08-05 | End: 2020-08-31 | Stop reason: SDUPTHER

## 2020-08-24 ENCOUNTER — TELEPHONE (OUTPATIENT)
Dept: ENDOCRINOLOGY | Facility: CLINIC | Age: 30
End: 2020-08-24

## 2020-08-24 NOTE — TELEPHONE ENCOUNTER
Script sent on 8/5/2020. Will call [facility]. [facility]. Due to insurance stating max age of 18yrs.

## 2020-08-24 NOTE — TELEPHONE ENCOUNTER
Pt called wanting to know if there is something else can be prescribed, or done, so that she can get her admelog, or some other type of insulin. She states that her insurance won't cover it, and she is needing to discuss this. She has an appointment on 9/3/2020 that I have left scheduled, in case she may need it.

## 2020-08-25 ENCOUNTER — TELEPHONE (OUTPATIENT)
Dept: ENDOCRINOLOGY | Facility: CLINIC | Age: 30
End: 2020-08-25

## 2020-08-26 ENCOUNTER — TRANSCRIBE ORDERS (OUTPATIENT)
Dept: PODIATRY | Facility: CLINIC | Age: 30
End: 2020-08-26

## 2020-08-26 DIAGNOSIS — S92.901A UNSPECIFIED FRACTURE OF RIGHT FOOT, INITIAL ENCOUNTER FOR CLOSED FRACTURE: ICD-10-CM

## 2020-08-26 DIAGNOSIS — M79.671 RIGHT FOOT PAIN: Primary | ICD-10-CM

## 2020-08-27 ENCOUNTER — TELEPHONE (OUTPATIENT)
Dept: ENDOCRINOLOGY | Facility: CLINIC | Age: 30
End: 2020-08-27

## 2020-08-27 ENCOUNTER — OFFICE VISIT (OUTPATIENT)
Dept: PODIATRY | Facility: CLINIC | Age: 30
End: 2020-08-27

## 2020-08-27 VITALS — HEIGHT: 63 IN | WEIGHT: 205 LBS | OXYGEN SATURATION: 98 % | BODY MASS INDEX: 36.32 KG/M2 | HEART RATE: 104 BPM

## 2020-08-27 DIAGNOSIS — S93.491A SPRAIN OF ANTERIOR TALOFIBULAR LIGAMENT OF RIGHT ANKLE, INITIAL ENCOUNTER: Primary | ICD-10-CM

## 2020-08-27 DIAGNOSIS — S86.311A STRAIN OF PERONEAL TENDON OF RIGHT FOOT, INITIAL ENCOUNTER: ICD-10-CM

## 2020-08-27 PROCEDURE — 99203 OFFICE O/P NEW LOW 30 MIN: CPT | Performed by: PODIATRIST

## 2020-08-27 NOTE — PROGRESS NOTES
Veena Buck  1990  30 y.o. female   PCP: Beverley Dunne, ALDAIR 08/26/2020  BS: 314 per patient    Patient presents to clinic today with complaint of right foot pain.      08/27/2020  Chief Complaint   Patient presents with   • Right Foot - Pain           History of Present Illness    Veena Buck is a 30 y.o. female who presents for evaluation of right ankle pain following injury.  Patient states that this past Monday she was walking in flip-flops in her yard when she stepped in a hole while carrying her child.  She feels that her ankle gave way and she noticed a immediate pain and swelling.  She has been ambulating with a limp since that time.  She was evaluated at the urgent care who took x-rays and identified a possible small fracture of her cuboid.  She was given an Ace bandage and has been limping in her flip-flop since that time.  She denies any other trauma or injuries.  She has been taking ibuprofen and had a one-time dose of Toradol with minimal improvement of her pain.      Past Medical History:   Diagnosis Date   • Anxiety    • Asthma    • Diabetes mellitus (CMS/[facility])     Type 2   • Gestational diabetes     controlled with Insulin          Past Surgical History:   Procedure Laterality Date   • ANKLE SURGERY Left 2010   • WISDOM TOOTH EXTRACTION           Family History   Problem Relation Age of Onset   • Cancer Mother    • Heart disease Mother    • Diabetes Maternal Grandmother          Social History     Socioeconomic History   • Marital status:      Spouse name: Not on file   • Number of children: Not on file   • Years of education: Not on file   • Highest education level: Not on file   Tobacco Use   • Smoking status: Current Every Day Smoker     Packs/day: 1.00     Types: Cigarettes   • Smokeless tobacco: Never Used   • Tobacco comment: 1*800*quit*now   Substance and Sexual Activity   • Alcohol use: No   • Drug use: No   • Sexual activity: Yes     Partners: Male     Birth  "control/protection: None         Current Outpatient Medications   Medication Sig Dispense Refill   • albuterol sulfate HFA (Ventolin HFA) 108 (90 Base) MCG/ACT inhaler Inhale 2 puffs Every 4 (Four) Hours As Needed for Wheezing. 1 inhaler 0   • Alcohol Swabs (ALCOHOL WIPES) 70 % pads Use 4 x daily 120 each 11   • B-D ULTRA-FINE 33 LANCETS misc Use 4 x daily , any brand covered by insurance 120 each 11   • Blood Glucose Monitoring Suppl w/Device kit USE AS INDICATED, ANY MONITOR , ICD10 code is E11.9 1 each 1   • Ertugliflozin L-PyroglutamicAc (Steglatro) 5 MG tablet Take 1 tablet by mouth Every Morning. 30 tablet 11   • Glucose Blood (BLOOD GLUCOSE TEST) strip Use 4 x daily use any brand covered by insurance or same brand as before ICD10 code is E11.9 120 each 11   • Insulin Glargine (BASAGLAR KWIKPEN) 100 UNIT/ML injection pen 15 units at night 2 pen 11   • Insulin Lispro (ADMELOG SOLOSTAR) 100 UNIT/ML injection pen Inject 20 units with meals 3 times a day 6 pen 6   • Insulin Pen Needle (PEN NEEDLES) 32G X 4 MM misc 1 each 4 (Four) Times a Day. Use 4 x daily, Dx code E11.65 120 each 11   • Insulin Syringe 31G X 5/16\" 0.5 ML misc Use 4 x daily 120 each 11   • Lancet Devices (LANCING DEVICE) misc USE AS INDICATED TO CORRELATE WITH STRIPS AND METER 1 each 11   • loratadine (CLARITIN) 10 MG tablet Take 1 tablet by mouth Daily. 30 tablet 0   • medroxyPROGESTERone (DEPO-PROVERA) 150 MG/ML injection      • propranolol (INDERAL) 20 MG tablet      • Semaglutide, 1 MG/DOSE, (Ozempic, 1 MG/DOSE,) 2 MG/1.5ML solution pen-injector Inject 1 mg under the skin into the appropriate area as directed 1 (One) Time Per Week. 2 pen 11   • sertraline (ZOLOFT) 100 MG tablet      • sertraline (ZOLOFT) 50 MG tablet Take 1 tablet by mouth Daily. 90 tablet 2     No current facility-administered medications for this visit.          OBJECTIVE    Pulse 104   Ht 160 cm (63\")   Wt 93 kg (205 lb)   SpO2 98%   BMI 36.31 kg/m²       Review of " Systems   Constitutional: Negative.    HENT: Negative.    Eyes: Negative.    Respiratory: Negative.    Cardiovascular: Negative.    Gastrointestinal: Negative.    Endocrine: Negative.    Genitourinary: Negative.    Musculoskeletal:        Foot pain, Ankle pain   Skin: Negative.    Allergic/Immunologic: Negative.    Neurological: Negative.    Hematological: Negative.    Psychiatric/Behavioral: Negative.          Physical Exam   Constitutional: he appears well-developed and well-nourished.   HEENT: Normocephalic. Atraumatic.  CV: No CP. RRR  Resp: Non-labored respirations.  Psychiatric: he has a normal mood and affect. his behavior is normal.         Lower Extremity Exam:  Vascular: DP/PT pulses palpable 2+.   Mild right ankle edema  Foot warm  Neuro: Protective sensation intact, b/l.  Light touch sensation intact, b/l  DTRs intact  Integument: No open wounds or lesions.  No erythema, scaling  No ecchymosis  Musculoskeletal:  RLE muscle strength 5/5.   LLE dorsiflexion, plantarflexion, inversion, eversion intact; guarded.  Achilles, TA, TP, Peroneal tendons intact  No gross instability, exam limited by pain  Gait antalgic  Tenderness palpation of peroneal tendons, ATFL, anterior ankle margin.  No crepitus.              ASSESSMENT AND PLAN    Veena was seen today for pain.    Diagnoses and all orders for this visit:    Sprain of anterior talofibular ligament of right ankle, initial encounter    Strain of peroneal tendon of right foot, initial encounter      -Comprehensive foot and ankle exam  -Radiographs reviewed.  Area of possible cuboid avulsion fracture does not seem to correlate well with symptoms.  -Enrrique findings consistent with moderate ankle sprain, peroneal tendon strain.  We will place into a low tide Cam boot.  -Continue ibuprofen, scheduled  -Recheck 3 weeks          This document has been electronically signed by Tay Field DPM on August 27, 2020 15:56     EMR Dragon/Transcription disclaimer:    Much of this encounter note is an electronic transcription/translation of spoken language to printed text. The electronic translation of spoken language may permit erroneous, or at times, nonsensical words or phrases to be inadvertently transcribed; Although I have reviewed the note for such errors, some may still exist.    Tay Field DPM  8/27/2020  15:56

## 2020-08-31 DIAGNOSIS — E11.9 TYPE 2 DIABETES MELLITUS WITHOUT COMPLICATION, WITHOUT LONG-TERM CURRENT USE OF INSULIN (HCC): Primary | Chronic | ICD-10-CM

## 2020-08-31 RX ORDER — INSULIN LISPRO 100 U/ML
INJECTION, SOLUTION SUBCUTANEOUS
Qty: 6 PEN | Refills: 6 | Status: SHIPPED | OUTPATIENT
Start: 2020-08-31 | End: 2020-09-03

## 2020-08-31 NOTE — TELEPHONE ENCOUNTER
I called and they said they needed a PA and I told them we could complete on when we received the paper work

## 2020-09-03 ENCOUNTER — OFFICE VISIT (OUTPATIENT)
Dept: ENDOCRINOLOGY | Facility: CLINIC | Age: 30
End: 2020-09-03

## 2020-09-03 VITALS
OXYGEN SATURATION: 99 % | DIASTOLIC BLOOD PRESSURE: 62 MMHG | SYSTOLIC BLOOD PRESSURE: 130 MMHG | WEIGHT: 205.2 LBS | HEIGHT: 63 IN | HEART RATE: 105 BPM | BODY MASS INDEX: 36.36 KG/M2

## 2020-09-03 DIAGNOSIS — E11.9 TYPE 2 DIABETES MELLITUS WITHOUT COMPLICATION, WITHOUT LONG-TERM CURRENT USE OF INSULIN (HCC): Primary | Chronic | ICD-10-CM

## 2020-09-03 PROCEDURE — 99214 OFFICE O/P EST MOD 30 MIN: CPT | Performed by: NURSE PRACTITIONER

## 2020-09-03 NOTE — PROGRESS NOTES
"  Subjective    Veena Buck is a 30 y.o. female. she is here today for follow-up.    History of Present Illness     IN OFFICE VISIT       Primary Care Provider     Jr Prieto MD     Duration Dx approx at age 26 years old          Timing - Diabetes is Constant     Quality -  improving control      Severity -  moderate     Complications - none     Current symptoms/problems  increase appetite, polydipsia and polyuria      Alleviating Factors: Compliance       Aggravating Factors :  None     Side Effects  none     Current diet  in general, a \"healthy\" diet       Current exercise walking     Current monitoring regimen: home blood tests -       150 up to 200           Lab Results   Component Value Date     HGBA1C 6.2 2020                          Hypoglycemia none         The following portions of the patient's history were reviewed and updated as appropriate:   Past Medical History:   Diagnosis Date   • Anxiety    • Asthma    • Diabetes mellitus (CMS/[facility])     Type 2   • Gestational diabetes     controlled with Insulin      Past Surgical History:   Procedure Laterality Date   • ANKLE SURGERY Left    • WISDOM TOOTH EXTRACTION       Family History   Problem Relation Age of Onset   • Cancer Mother    • Heart disease Mother    • Diabetes Maternal Grandmother      OB History        1    Para        Term                AB        Living           SAB        TAB        Ectopic        Molar        Multiple        Live Births                  Current Outpatient Medications   Medication Sig Dispense Refill   • albuterol sulfate HFA (Ventolin HFA) 108 (90 Base) MCG/ACT inhaler Inhale 2 puffs Every 4 (Four) Hours As Needed for Wheezing. 1 inhaler 0   • Alcohol Swabs (ALCOHOL WIPES) 70 % pads Use 4 x daily 120 each 11   • B-D ULTRA-FINE 33 LANCETS misc Use 4 x daily , any brand covered by insurance 120 each 11   • Blood Glucose Monitoring Suppl w/Device kit USE AS INDICATED, ANY MONITOR , ICD10 code " "is E11.9 1 each 1   • Ertugliflozin L-PyroglutamicAc (Steglatro) 5 MG tablet Take 1 tablet by mouth Every Morning. 30 tablet 11   • Glucose Blood (BLOOD GLUCOSE TEST) strip Use 4 x daily use any brand covered by insurance or same brand as before ICD10 code is E11.9 120 each 11   • Insulin Glargine (BASAGLAR KWIKPEN) 100 UNIT/ML injection pen 15 units at night 2 pen 11   • insulin lispro (Admelog) 100 UNIT/ML injection Inject 100 Units under the skin into the appropriate area as directed Daily. 30 mL 11   • Insulin Pen Needle (PEN NEEDLES) 32G X 4 MM misc 1 each 4 (Four) Times a Day. Use 4 x daily, Dx code E11.65 120 each 11   • Insulin Syringe 31G X 5/16\" 0.5 ML misc Use 4 x daily 120 each 11   • Insulin Syringes, Disposable, U-100 0.5 ML misc 1 each 3 (Three) Times a Day. 100 each 11   • Lancet Devices (LANCING DEVICE) misc USE AS INDICATED TO CORRELATE WITH STRIPS AND METER 1 each 11   • loratadine (CLARITIN) 10 MG tablet Take 1 tablet by mouth Daily. 30 tablet 0   • medroxyPROGESTERone (DEPO-PROVERA) 150 MG/ML injection      • propranolol (INDERAL) 20 MG tablet      • Semaglutide, 1 MG/DOSE, (Ozempic, 1 MG/DOSE,) 2 MG/1.5ML solution pen-injector Inject 1 mg under the skin into the appropriate area as directed 1 (One) Time Per Week. 2 pen 11   • sertraline (ZOLOFT) 100 MG tablet      • sertraline (ZOLOFT) 50 MG tablet Take 1 tablet by mouth Daily. 90 tablet 2     No current facility-administered medications for this visit.      No Known Allergies  Social History     Socioeconomic History   • Marital status:      Spouse name: Not on file   • Number of children: Not on file   • Years of education: Not on file   • Highest education level: Not on file   Tobacco Use   • Smoking status: Current Every Day Smoker     Packs/day: 1.00     Types: Cigarettes   • Smokeless tobacco: Never Used   • Tobacco comment: 1*800*quit*now   Substance and Sexual Activity   • Alcohol use: No   • Drug use: No   • Sexual activity: Yes " "    Partners: Male     Birth control/protection: None       Review of Systems  Review of Systems   Constitutional: Negative for activity change, appetite change, diaphoresis and fatigue.   HENT: Negative for facial swelling, sneezing, sore throat, tinnitus, trouble swallowing and voice change.    Eyes: Negative for photophobia, pain, discharge, redness, itching and visual disturbance.   Respiratory: Negative for apnea, cough, choking, chest tightness and shortness of breath.    Cardiovascular: Negative for chest pain, palpitations and leg swelling.   Gastrointestinal: Negative for abdominal distention, abdominal pain, constipation, diarrhea, nausea and vomiting.   Endocrine: Negative for cold intolerance, heat intolerance, polydipsia, polyphagia and polyuria.   Genitourinary: Negative for difficulty urinating, dysuria, frequency, hematuria and urgency.   Musculoskeletal: Negative for arthralgias, back pain, gait problem, joint swelling, myalgias, neck pain and neck stiffness.   Skin: Negative for color change, pallor, rash and wound.   Neurological: Negative for dizziness, tremors, weakness, light-headedness, numbness and headaches.   Hematological: Negative for adenopathy. Does not bruise/bleed easily.   Psychiatric/Behavioral: Negative for behavioral problems, confusion and sleep disturbance.        Objective    /62   Pulse 105   Ht 160 cm (63\")   Wt 93.1 kg (205 lb 3.2 oz)   SpO2 99%   BMI 36.35 kg/m²   Physical Exam   Constitutional: She is oriented to person, place, and time. She appears well-developed and well-nourished. No distress.   HENT:   Head: Normocephalic and atraumatic.   Right Ear: External ear normal.   Left Ear: External ear normal.   Nose: Nose normal.   Eyes: Pupils are equal, round, and reactive to light. Conjunctivae and EOM are normal.   Neck: Normal range of motion. Neck supple. No tracheal deviation present. No thyromegaly present.   Cardiovascular: Normal rate, regular rhythm and " normal heart sounds.   No murmur heard.  Pulmonary/Chest: Effort normal and breath sounds normal. No respiratory distress. She has no wheezes.   Abdominal: Soft. Bowel sounds are normal. There is no tenderness. There is no rebound and no guarding.   Musculoskeletal: Normal range of motion. She exhibits no edema, tenderness or deformity.   Neurological: She is alert and oriented to person, place, and time. No cranial nerve deficit.   Skin: Skin is warm and dry. No rash noted.   Psychiatric: She has a normal mood and affect. Her behavior is normal. Judgment and thought content normal.       Lab Review  Glucose (mg/dL)   Date Value   05/03/2019 189 (H)   06/26/2018 309 (H)   06/30/2017 277 (H)     Sodium (mmol/L)   Date Value   05/03/2019 136   06/26/2018 136 (L)   06/30/2017 139     Potassium (mmol/L)   Date Value   05/03/2019 3.9   06/26/2018 4.0   06/30/2017 4.3     Chloride (mmol/L)   Date Value   05/03/2019 104   06/26/2018 97   06/30/2017 97     CO2 (mmol/L)   Date Value   05/03/2019 20.5 (L)   06/26/2018 28.0   06/30/2017 28.0     BUN (mg/dL)   Date Value   05/03/2019 5 (L)   06/26/2018 6 (L)   06/30/2017 5 (L)     Creatinine (mg/dL)   Date Value   05/03/2019 0.54 (L)   06/26/2018 0.60   06/30/2017 0.60     Hemoglobin A1C (%)   Date Value   01/31/2020 6.2   10/28/2019 5.7   06/26/2019 5.6   05/03/2019 5.70 (H)   01/29/2019 7.9 (H)     Triglycerides (mg/dL)   Date Value   06/30/2017 388 (H)     LDL Cholesterol  (mg/dL)   Date Value   06/30/2017 117       Assessment/Plan      1. Type 2 diabetes mellitus without complication, without long-term current use of insulin (CMS/[facility])    2. BMI 37.0-37.9, adult    .    Medications prescribed:  Outpatient Encounter Medications as of 9/3/2020   Medication Sig Dispense Refill   • albuterol sulfate HFA (Ventolin HFA) 108 (90 Base) MCG/ACT inhaler Inhale 2 puffs Every 4 (Four) Hours As Needed for Wheezing. 1 inhaler 0   • Alcohol Swabs (ALCOHOL WIPES) 70 % pads Use 4 x daily 120  "each 11   • B-D ULTRA-FINE 33 LANCETS misc Use 4 x daily , any brand covered by insurance 120 each 11   • Blood Glucose Monitoring Suppl w/Device kit USE AS INDICATED, ANY MONITOR , ICD10 code is E11.9 1 each 1   • Ertugliflozin L-PyroglutamicAc (Steglatro) 5 MG tablet Take 1 tablet by mouth Every Morning. 30 tablet 11   • Glucose Blood (BLOOD GLUCOSE TEST) strip Use 4 x daily use any brand covered by insurance or same brand as before ICD10 code is E11.9 120 each 11   • Insulin Glargine (BASAGLAR KWIKPEN) 100 UNIT/ML injection pen 15 units at night 2 pen 11   • insulin lispro (Admelog) 100 UNIT/ML injection Inject 100 Units under the skin into the appropriate area as directed Daily. 30 mL 11   • Insulin Pen Needle (PEN NEEDLES) 32G X 4 MM misc 1 each 4 (Four) Times a Day. Use 4 x daily, Dx code E11.65 120 each 11   • Insulin Syringe 31G X 5/16\" 0.5 ML misc Use 4 x daily 120 each 11   • Insulin Syringes, Disposable, U-100 0.5 ML misc 1 each 3 (Three) Times a Day. 100 each 11   • Lancet Devices (LANCING DEVICE) misc USE AS INDICATED TO CORRELATE WITH STRIPS AND METER 1 each 11   • loratadine (CLARITIN) 10 MG tablet Take 1 tablet by mouth Daily. 30 tablet 0   • medroxyPROGESTERone (DEPO-PROVERA) 150 MG/ML injection      • propranolol (INDERAL) 20 MG tablet      • Semaglutide, 1 MG/DOSE, (Ozempic, 1 MG/DOSE,) 2 MG/1.5ML solution pen-injector Inject 1 mg under the skin into the appropriate area as directed 1 (One) Time Per Week. 2 pen 11   • sertraline (ZOLOFT) 100 MG tablet      • sertraline (ZOLOFT) 50 MG tablet Take 1 tablet by mouth Daily. 90 tablet 2   • [DISCONTINUED] Insulin Lispro (ADMELOG SOLOSTAR) 100 UNIT/ML injection pen Inject 20 units with meals 3 times a day 6 pen 6     No facility-administered encounter medications on file as of 9/3/2020.        Orders placed during this encounter include:  No orders of the defined types were placed in this encounter.    Pt has type 2 diabetes with hyperglycemia   "   Glycemic Management:      Lab Results   Component Value Date    HGBA1C 6.2 01/31/2020                   Metformin , side effects        ozempic  1 mg weekly       steglatro 5 mg daily         Admelog  , not using -- approved today     Will start today      If before meal Sliding scale   130-160 : 2 units  161-200 : 4units  201-240 : 6units  Above 240 : 8units          Admelog  If after meal  Sliding scale      180-200 : 4units  201-240 : 6units  Above 240 : 8units       Do either before or after      basaglar 15 at night            Weight management    Patient's Body mass index is 36.35 kg/m². BMI is above normal parameters. Recommendations include: nutrition counseling.    Decrease caloric intake by 500 calories       4. Follow-up: Return in about 4 weeks (around 10/1/2020).    .

## 2021-04-23 RX ORDER — SEMAGLUTIDE 1.34 MG/ML
1 INJECTION, SOLUTION SUBCUTANEOUS WEEKLY
Qty: 2 PEN | Refills: 11 | Status: SHIPPED | OUTPATIENT
Start: 2021-04-23 | End: 2021-04-23 | Stop reason: SDUPTHER

## 2021-04-23 RX ORDER — SEMAGLUTIDE 1.34 MG/ML
1 INJECTION, SOLUTION SUBCUTANEOUS WEEKLY
Qty: 2 PEN | Refills: 11 | Status: SHIPPED | OUTPATIENT
Start: 2021-04-23 | End: 2022-12-29

## 2021-10-02 ENCOUNTER — APPOINTMENT (OUTPATIENT)
Dept: CT IMAGING | Facility: HOSPITAL | Age: 31
End: 2021-10-02

## 2021-10-02 ENCOUNTER — APPOINTMENT (OUTPATIENT)
Dept: GENERAL RADIOLOGY | Facility: HOSPITAL | Age: 31
End: 2021-10-02

## 2021-10-02 ENCOUNTER — HOSPITAL ENCOUNTER (INPATIENT)
Facility: HOSPITAL | Age: 31
LOS: 3 days | Discharge: HOME | End: 2021-10-05
Attending: EMERGENCY MEDICINE | Admitting: INTERNAL MEDICINE

## 2021-10-02 DIAGNOSIS — N10 ACUTE PYELONEPHRITIS: ICD-10-CM

## 2021-10-02 DIAGNOSIS — E11.65 TYPE 2 DIABETES MELLITUS WITH HYPERGLYCEMIA, UNSPECIFIED WHETHER LONG TERM INSULIN USE (HCC): ICD-10-CM

## 2021-10-02 DIAGNOSIS — A41.9 SEPSIS, DUE TO UNSPECIFIED ORGANISM, UNSPECIFIED WHETHER ACUTE ORGAN DYSFUNCTION PRESENT (HCC): ICD-10-CM

## 2021-10-02 DIAGNOSIS — A49.9 UTI (URINARY TRACT INFECTION), BACTERIAL: ICD-10-CM

## 2021-10-02 DIAGNOSIS — U07.1 COVID-19 VIRUS INFECTION: Primary | ICD-10-CM

## 2021-10-02 DIAGNOSIS — N39.0 UTI (URINARY TRACT INFECTION), BACTERIAL: ICD-10-CM

## 2021-10-02 PROBLEM — E66.9 OBESITY (BMI 30-39.9): Status: ACTIVE | Noted: 2018-07-17

## 2021-10-02 LAB
ANION GAP SERPL CALCULATED.3IONS-SCNC: 16 MMOL/L (ref 5–15)
ANION GAP SERPL CALCULATED.3IONS-SCNC: 16 MMOL/L (ref 5–15)
BACTERIA BLD CULT: ABNORMAL
BACTERIA UR QL AUTO: ABNORMAL /HPF
BASOPHILS # BLD AUTO: 0.07 10*3/MM3 (ref 0–0.2)
BASOPHILS NFR BLD AUTO: 0.4 % (ref 0–1.5)
BILIRUB UR QL STRIP: NEGATIVE
BUN SERPL-MCNC: 11 MG/DL (ref 6–20)
BUN SERPL-MCNC: 12 MG/DL (ref 6–20)
BUN/CREAT SERPL: 10.9 (ref 7–25)
BUN/CREAT SERPL: 11.9 (ref 7–25)
CALCIUM SPEC-SCNC: 8.9 MG/DL (ref 8.6–10.5)
CALCIUM SPEC-SCNC: 9.6 MG/DL (ref 8.6–10.5)
CHLORIDE SERPL-SCNC: 88 MMOL/L (ref 98–107)
CHLORIDE SERPL-SCNC: 92 MMOL/L (ref 98–107)
CLARITY UR: ABNORMAL
CO2 SERPL-SCNC: 21 MMOL/L (ref 22–29)
CO2 SERPL-SCNC: 21 MMOL/L (ref 22–29)
COLOR UR: YELLOW
CREAT SERPL-MCNC: 1.01 MG/DL (ref 0.57–1)
CREAT SERPL-MCNC: 1.01 MG/DL (ref 0.57–1)
D-DIMER, QUANTITATIVE (MAD,POW, STR): 2470 NG/ML (FEU) (ref 0–470)
D-LACTATE SERPL-SCNC: 1.6 MMOL/L (ref 0.5–2)
DEPRECATED RDW RBC AUTO: 38.2 FL (ref 37–54)
EOSINOPHIL # BLD AUTO: 0.02 10*3/MM3 (ref 0–0.4)
EOSINOPHIL NFR BLD AUTO: 0.1 % (ref 0.3–6.2)
ERYTHROCYTE [DISTWIDTH] IN BLOOD BY AUTOMATED COUNT: 12.4 % (ref 12.3–15.4)
FLUAV SUBTYP SPEC NAA+PROBE: NOT DETECTED
FLUBV RNA ISLT QL NAA+PROBE: NOT DETECTED
GFR SERPL CREATININE-BSD FRML MDRD: 64 ML/MIN/1.73
GFR SERPL CREATININE-BSD FRML MDRD: 64 ML/MIN/1.73
GLUCOSE BLDC GLUCOMTR-MCNC: 287 MG/DL (ref 70–130)
GLUCOSE BLDC GLUCOMTR-MCNC: 303 MG/DL (ref 70–130)
GLUCOSE BLDC GLUCOMTR-MCNC: 306 MG/DL (ref 70–130)
GLUCOSE BLDC GLUCOMTR-MCNC: 308 MG/DL (ref 70–130)
GLUCOSE SERPL-MCNC: 355 MG/DL (ref 65–99)
GLUCOSE SERPL-MCNC: 440 MG/DL (ref 65–99)
GLUCOSE UR STRIP-MCNC: ABNORMAL MG/DL
HCG SERPL QL: NEGATIVE
HCT VFR BLD AUTO: 40.5 % (ref 34–46.6)
HGB BLD-MCNC: 14.4 G/DL (ref 12–15.9)
HGB UR QL STRIP.AUTO: ABNORMAL
HYALINE CASTS UR QL AUTO: ABNORMAL /LPF
IMM GRANULOCYTES # BLD AUTO: 0.42 10*3/MM3 (ref 0–0.05)
IMM GRANULOCYTES NFR BLD AUTO: 2.1 % (ref 0–0.5)
KETONES UR QL STRIP: ABNORMAL
L PNEUMO1 AG UR QL IA: NEGATIVE
LEUKOCYTE ESTERASE UR QL STRIP.AUTO: ABNORMAL
LYMPHOCYTES # BLD AUTO: 0.5 10*3/MM3 (ref 0.7–3.1)
LYMPHOCYTES NFR BLD AUTO: 2.5 % (ref 19.6–45.3)
MCH RBC QN AUTO: 30.1 PG (ref 26.6–33)
MCHC RBC AUTO-ENTMCNC: 35.6 G/DL (ref 31.5–35.7)
MCV RBC AUTO: 84.7 FL (ref 79–97)
MONOCYTES # BLD AUTO: 1.03 10*3/MM3 (ref 0.1–0.9)
MONOCYTES NFR BLD AUTO: 5.2 % (ref 5–12)
MRSA DNA SPEC QL NAA+PROBE: NEGATIVE
NEUTROPHILS NFR BLD AUTO: 17.88 10*3/MM3 (ref 1.7–7)
NEUTROPHILS NFR BLD AUTO: 89.7 % (ref 42.7–76)
NITRITE UR QL STRIP: POSITIVE
NRBC BLD AUTO-RTO: 0 /100 WBC (ref 0–0.2)
NT-PROBNP SERPL-MCNC: 139.6 PG/ML (ref 0–450)
PH UR STRIP.AUTO: 5.5 [PH] (ref 5–9)
PLATELET # BLD AUTO: 152 10*3/MM3 (ref 140–450)
PMV BLD AUTO: 10.4 FL (ref 6–12)
POTASSIUM SERPL-SCNC: 3.5 MMOL/L (ref 3.5–5.2)
POTASSIUM SERPL-SCNC: 3.7 MMOL/L (ref 3.5–5.2)
PROCALCITONIN SERPL-MCNC: 11.82 NG/ML (ref 0–0.25)
PROT UR QL STRIP: ABNORMAL
QT INTERVAL: 266 MS
QTC INTERVAL: 395 MS
RBC # BLD AUTO: 4.78 10*6/MM3 (ref 3.77–5.28)
RBC # UR: ABNORMAL /HPF
REF LAB TEST METHOD: ABNORMAL
S PNEUM AG SPEC QL LA: NEGATIVE
SARS-COV-2 RNA PNL SPEC NAA+PROBE: DETECTED
SODIUM SERPL-SCNC: 125 MMOL/L (ref 136–145)
SODIUM SERPL-SCNC: 129 MMOL/L (ref 136–145)
SP GR UR STRIP: 1.03 (ref 1–1.03)
SQUAMOUS #/AREA URNS HPF: ABNORMAL /HPF
TROPONIN T SERPL-MCNC: <0.01 NG/ML (ref 0–0.03)
TROPONIN T SERPL-MCNC: <0.01 NG/ML (ref 0–0.03)
UROBILINOGEN UR QL STRIP: ABNORMAL
WBC # BLD AUTO: 19.92 10*3/MM3 (ref 3.4–10.8)
WBC UR QL AUTO: ABNORMAL /HPF
YEAST URNS QL MICRO: ABNORMAL /HPF

## 2021-10-02 PROCEDURE — 63710000001 INSULIN ASPART PER 5 UNITS: Performed by: INTERNAL MEDICINE

## 2021-10-02 PROCEDURE — 25010000002 MORPHINE PER 10 MG: Performed by: NURSE PRACTITIONER

## 2021-10-02 PROCEDURE — 82962 GLUCOSE BLOOD TEST: CPT

## 2021-10-02 PROCEDURE — 87150 DNA/RNA AMPLIFIED PROBE: CPT | Performed by: EMERGENCY MEDICINE

## 2021-10-02 PROCEDURE — 93005 ELECTROCARDIOGRAM TRACING: CPT | Performed by: EMERGENCY MEDICINE

## 2021-10-02 PROCEDURE — 25010000002 CEFTRIAXONE PER 250 MG: Performed by: EMERGENCY MEDICINE

## 2021-10-02 PROCEDURE — 63710000001 INSULIN DETEMIR PER 5 UNITS: Performed by: INTERNAL MEDICINE

## 2021-10-02 PROCEDURE — 81001 URINALYSIS AUTO W/SCOPE: CPT | Performed by: EMERGENCY MEDICINE

## 2021-10-02 PROCEDURE — 83880 ASSAY OF NATRIURETIC PEPTIDE: CPT | Performed by: EMERGENCY MEDICINE

## 2021-10-02 PROCEDURE — 93010 ELECTROCARDIOGRAM REPORT: CPT | Performed by: INTERNAL MEDICINE

## 2021-10-02 PROCEDURE — 71275 CT ANGIOGRAPHY CHEST: CPT

## 2021-10-02 PROCEDURE — 84484 ASSAY OF TROPONIN QUANT: CPT | Performed by: EMERGENCY MEDICINE

## 2021-10-02 PROCEDURE — 85025 COMPLETE CBC W/AUTO DIFF WBC: CPT | Performed by: EMERGENCY MEDICINE

## 2021-10-02 PROCEDURE — 87641 MR-STAPH DNA AMP PROBE: CPT | Performed by: INTERNAL MEDICINE

## 2021-10-02 PROCEDURE — 85379 FIBRIN DEGRADATION QUANT: CPT | Performed by: EMERGENCY MEDICINE

## 2021-10-02 PROCEDURE — 25010000002 SODIUM CHLORIDE 0.9 % WITH KCL 20 MEQ 20-0.9 MEQ/L-% SOLUTION: Performed by: INTERNAL MEDICINE

## 2021-10-02 PROCEDURE — 83605 ASSAY OF LACTIC ACID: CPT | Performed by: EMERGENCY MEDICINE

## 2021-10-02 PROCEDURE — 25010000002 ENOXAPARIN PER 10 MG: Performed by: INTERNAL MEDICINE

## 2021-10-02 PROCEDURE — 87636 SARSCOV2 & INF A&B AMP PRB: CPT | Performed by: EMERGENCY MEDICINE

## 2021-10-02 PROCEDURE — 74176 CT ABD & PELVIS W/O CONTRAST: CPT

## 2021-10-02 PROCEDURE — 63710000001 INSULIN REGULAR HUMAN PER 5 UNITS: Performed by: EMERGENCY MEDICINE

## 2021-10-02 PROCEDURE — 87186 SC STD MICRODIL/AGAR DIL: CPT | Performed by: EMERGENCY MEDICINE

## 2021-10-02 PROCEDURE — 87899 AGENT NOS ASSAY W/OPTIC: CPT | Performed by: INTERNAL MEDICINE

## 2021-10-02 PROCEDURE — 80048 BASIC METABOLIC PNL TOTAL CA: CPT | Performed by: EMERGENCY MEDICINE

## 2021-10-02 PROCEDURE — 84703 CHORIONIC GONADOTROPIN ASSAY: CPT | Performed by: EMERGENCY MEDICINE

## 2021-10-02 PROCEDURE — 87186 SC STD MICRODIL/AGAR DIL: CPT | Performed by: INTERNAL MEDICINE

## 2021-10-02 PROCEDURE — 87040 BLOOD CULTURE FOR BACTERIA: CPT | Performed by: EMERGENCY MEDICINE

## 2021-10-02 PROCEDURE — 0 IOPAMIDOL PER 1 ML: Performed by: EMERGENCY MEDICINE

## 2021-10-02 PROCEDURE — 87088 URINE BACTERIA CULTURE: CPT | Performed by: EMERGENCY MEDICINE

## 2021-10-02 PROCEDURE — 25010000002 AZITHROMYCIN PER 500 MG: Performed by: EMERGENCY MEDICINE

## 2021-10-02 PROCEDURE — 87086 URINE CULTURE/COLONY COUNT: CPT | Performed by: INTERNAL MEDICINE

## 2021-10-02 PROCEDURE — 84145 PROCALCITONIN (PCT): CPT | Performed by: EMERGENCY MEDICINE

## 2021-10-02 PROCEDURE — 25010000002 VANCOMYCIN 5 G RECONSTITUTED SOLUTION: Performed by: INTERNAL MEDICINE

## 2021-10-02 PROCEDURE — 25010000002 CEFEPIME PER 500 MG: Performed by: INTERNAL MEDICINE

## 2021-10-02 PROCEDURE — 71045 X-RAY EXAM CHEST 1 VIEW: CPT

## 2021-10-02 PROCEDURE — 99285 EMERGENCY DEPT VISIT HI MDM: CPT

## 2021-10-02 RX ORDER — NICOTINE POLACRILEX 4 MG
15 LOZENGE BUCCAL
Status: DISCONTINUED | OUTPATIENT
Start: 2021-10-02 | End: 2021-10-05 | Stop reason: HOSPADM

## 2021-10-02 RX ORDER — ACETAMINOPHEN 325 MG/1
650 TABLET ORAL EVERY 6 HOURS PRN
Status: DISCONTINUED | OUTPATIENT
Start: 2021-10-02 | End: 2021-10-05 | Stop reason: HOSPADM

## 2021-10-02 RX ORDER — HYDROCODONE BITARTRATE AND ACETAMINOPHEN 5; 325 MG/1; MG/1
1 TABLET ORAL EVERY 6 HOURS PRN
Status: DISCONTINUED | OUTPATIENT
Start: 2021-10-02 | End: 2021-10-05 | Stop reason: HOSPADM

## 2021-10-02 RX ORDER — SODIUM CHLORIDE 0.9 % (FLUSH) 0.9 %
10 SYRINGE (ML) INJECTION AS NEEDED
Status: DISCONTINUED | OUTPATIENT
Start: 2021-10-02 | End: 2021-10-05 | Stop reason: HOSPADM

## 2021-10-02 RX ORDER — SODIUM CHLORIDE AND POTASSIUM CHLORIDE 150; 900 MG/100ML; MG/100ML
125 INJECTION, SOLUTION INTRAVENOUS CONTINUOUS
Status: DISCONTINUED | OUTPATIENT
Start: 2021-10-02 | End: 2021-10-02

## 2021-10-02 RX ORDER — IBUPROFEN 400 MG/1
400 TABLET ORAL EVERY 4 HOURS PRN
Status: DISCONTINUED | OUTPATIENT
Start: 2021-10-02 | End: 2021-10-05 | Stop reason: HOSPADM

## 2021-10-02 RX ORDER — MORPHINE SULFATE 2 MG/ML
2 INJECTION, SOLUTION INTRAMUSCULAR; INTRAVENOUS EVERY 4 HOURS PRN
Status: DISCONTINUED | OUTPATIENT
Start: 2021-10-02 | End: 2021-10-05 | Stop reason: HOSPADM

## 2021-10-02 RX ORDER — CETIRIZINE HYDROCHLORIDE 10 MG/1
10 TABLET ORAL DAILY
Status: DISCONTINUED | OUTPATIENT
Start: 2021-10-02 | End: 2021-10-05 | Stop reason: HOSPADM

## 2021-10-02 RX ORDER — ACETAMINOPHEN 500 MG
1000 TABLET ORAL ONCE
Status: COMPLETED | OUTPATIENT
Start: 2021-10-02 | End: 2021-10-02

## 2021-10-02 RX ORDER — SODIUM CHLORIDE 9 MG/ML
125 INJECTION, SOLUTION INTRAVENOUS CONTINUOUS
Status: DISCONTINUED | OUTPATIENT
Start: 2021-10-02 | End: 2021-10-02

## 2021-10-02 RX ORDER — SODIUM CHLORIDE AND POTASSIUM CHLORIDE 150; 900 MG/100ML; MG/100ML
75 INJECTION, SOLUTION INTRAVENOUS CONTINUOUS
Status: DISCONTINUED | OUTPATIENT
Start: 2021-10-02 | End: 2021-10-05 | Stop reason: HOSPADM

## 2021-10-02 RX ORDER — SODIUM CHLORIDE 0.9 % (FLUSH) 0.9 %
10 SYRINGE (ML) INJECTION EVERY 12 HOURS SCHEDULED
Status: DISCONTINUED | OUTPATIENT
Start: 2021-10-02 | End: 2021-10-05 | Stop reason: HOSPADM

## 2021-10-02 RX ORDER — SODIUM CHLORIDE, SODIUM LACTATE, POTASSIUM CHLORIDE, CALCIUM CHLORIDE 600; 310; 30; 20 MG/100ML; MG/100ML; MG/100ML; MG/100ML
125 INJECTION, SOLUTION INTRAVENOUS CONTINUOUS
Status: DISCONTINUED | OUTPATIENT
Start: 2021-10-02 | End: 2021-10-02

## 2021-10-02 RX ORDER — ONDANSETRON 2 MG/ML
4 INJECTION INTRAMUSCULAR; INTRAVENOUS EVERY 6 HOURS PRN
Status: DISCONTINUED | OUTPATIENT
Start: 2021-10-02 | End: 2021-10-05 | Stop reason: HOSPADM

## 2021-10-02 RX ORDER — FAMOTIDINE 20 MG/1
20 TABLET, FILM COATED ORAL
Status: DISCONTINUED | OUTPATIENT
Start: 2021-10-02 | End: 2021-10-05 | Stop reason: HOSPADM

## 2021-10-02 RX ORDER — LEVOFLOXACIN 5 MG/ML
750 INJECTION, SOLUTION INTRAVENOUS EVERY 24 HOURS
Status: DISCONTINUED | OUTPATIENT
Start: 2021-10-03 | End: 2021-10-04

## 2021-10-02 RX ORDER — DEXTROSE MONOHYDRATE 25 G/50ML
25 INJECTION, SOLUTION INTRAVENOUS
Status: DISCONTINUED | OUTPATIENT
Start: 2021-10-02 | End: 2021-10-05 | Stop reason: HOSPADM

## 2021-10-02 RX ADMIN — SODIUM CHLORIDE, POTASSIUM CHLORIDE, SODIUM LACTATE AND CALCIUM CHLORIDE 125 ML/HR: 600; 310; 30; 20 INJECTION, SOLUTION INTRAVENOUS at 11:42

## 2021-10-02 RX ADMIN — VANCOMYCIN HYDROCHLORIDE 2000 MG: 5 INJECTION, POWDER, LYOPHILIZED, FOR SOLUTION INTRAVENOUS at 11:41

## 2021-10-02 RX ADMIN — SODIUM CHLORIDE, PRESERVATIVE FREE 10 ML: 5 INJECTION INTRAVENOUS at 20:32

## 2021-10-02 RX ADMIN — CEFTRIAXONE SODIUM 1 G: 1 INJECTION, POWDER, FOR SOLUTION INTRAMUSCULAR; INTRAVENOUS at 03:34

## 2021-10-02 RX ADMIN — ACETAMINOPHEN 650 MG: 325 TABLET, FILM COATED ORAL at 09:56

## 2021-10-02 RX ADMIN — SODIUM CHLORIDE 500 ML: 9 INJECTION, SOLUTION INTRAVENOUS at 03:13

## 2021-10-02 RX ADMIN — ACETAMINOPHEN 650 MG: 325 TABLET, FILM COATED ORAL at 15:44

## 2021-10-02 RX ADMIN — INSULIN ASPART 8 UNITS: 100 INJECTION, SOLUTION INTRAVENOUS; SUBCUTANEOUS at 10:02

## 2021-10-02 RX ADMIN — ENOXAPARIN SODIUM 40 MG: 40 INJECTION SUBCUTANEOUS at 10:34

## 2021-10-02 RX ADMIN — HYDROCODONE BITARTRATE AND ACETAMINOPHEN 1 TABLET: 5; 325 TABLET ORAL at 11:41

## 2021-10-02 RX ADMIN — IBUPROFEN 400 MG: 400 TABLET ORAL at 16:24

## 2021-10-02 RX ADMIN — HUMAN INSULIN 8 UNITS: 100 INJECTION, SOLUTION SUBCUTANEOUS at 03:29

## 2021-10-02 RX ADMIN — CEFEPIME HYDROCHLORIDE 2 G: 2 INJECTION, POWDER, FOR SOLUTION INTRAVENOUS at 20:31

## 2021-10-02 RX ADMIN — INSULIN ASPART 6 UNITS: 100 INJECTION, SOLUTION INTRAVENOUS; SUBCUTANEOUS at 23:47

## 2021-10-02 RX ADMIN — HYDROCODONE BITARTRATE AND ACETAMINOPHEN 1 TABLET: 5; 325 TABLET ORAL at 23:37

## 2021-10-02 RX ADMIN — AZITHROMYCIN 500 MG: 500 INJECTION, POWDER, LYOPHILIZED, FOR SOLUTION INTRAVENOUS at 04:46

## 2021-10-02 RX ADMIN — SODIUM CHLORIDE 1000 ML: 9 INJECTION, SOLUTION INTRAVENOUS at 09:56

## 2021-10-02 RX ADMIN — POTASSIUM CHLORIDE AND SODIUM CHLORIDE 125 ML/HR: 900; 150 INJECTION, SOLUTION INTRAVENOUS at 21:41

## 2021-10-02 RX ADMIN — FAMOTIDINE 20 MG: 20 TABLET ORAL at 09:57

## 2021-10-02 RX ADMIN — SODIUM CHLORIDE 1000 ML: 9 INJECTION, SOLUTION INTRAVENOUS at 05:13

## 2021-10-02 RX ADMIN — SODIUM CHLORIDE, POTASSIUM CHLORIDE, SODIUM LACTATE AND CALCIUM CHLORIDE 1000 ML: 600; 310; 30; 20 INJECTION, SOLUTION INTRAVENOUS at 16:24

## 2021-10-02 RX ADMIN — INSULIN DETEMIR 10 UNITS: 100 INJECTION, SOLUTION SUBCUTANEOUS at 21:35

## 2021-10-02 RX ADMIN — INSULIN ASPART 10 UNITS: 100 INJECTION, SOLUTION INTRAVENOUS; SUBCUTANEOUS at 17:42

## 2021-10-02 RX ADMIN — INSULIN ASPART 12 UNITS: 100 INJECTION, SOLUTION INTRAVENOUS; SUBCUTANEOUS at 21:37

## 2021-10-02 RX ADMIN — CETIRIZINE HYDROCHLORIDE 10 MG: 10 TABLET, FILM COATED ORAL at 09:57

## 2021-10-02 RX ADMIN — MORPHINE SULFATE 2 MG: 2 INJECTION, SOLUTION INTRAMUSCULAR; INTRAVENOUS at 10:34

## 2021-10-02 RX ADMIN — SODIUM CHLORIDE, PRESERVATIVE FREE 10 ML: 5 INJECTION INTRAVENOUS at 10:35

## 2021-10-02 RX ADMIN — SODIUM CHLORIDE 500 ML: 9 INJECTION, SOLUTION INTRAVENOUS at 02:15

## 2021-10-02 RX ADMIN — FAMOTIDINE 20 MG: 20 TABLET ORAL at 17:42

## 2021-10-02 RX ADMIN — HYDROCODONE BITARTRATE AND ACETAMINOPHEN 1 TABLET: 5; 325 TABLET ORAL at 17:42

## 2021-10-02 RX ADMIN — IOPAMIDOL 56 ML: 755 INJECTION, SOLUTION INTRAVENOUS at 03:23

## 2021-10-02 RX ADMIN — SERTRALINE 50 MG: 50 TABLET, FILM COATED ORAL at 09:57

## 2021-10-02 RX ADMIN — ACETAMINOPHEN 1000 MG: 500 TABLET, FILM COATED ORAL at 05:13

## 2021-10-02 RX ADMIN — MORPHINE SULFATE 2 MG: 2 INJECTION, SOLUTION INTRAMUSCULAR; INTRAVENOUS at 14:19

## 2021-10-02 RX ADMIN — INSULIN ASPART 10 UNITS: 100 INJECTION, SOLUTION INTRAVENOUS; SUBCUTANEOUS at 11:46

## 2021-10-02 NOTE — PLAN OF CARE
Goal Outcome Evaluation:  Plan of Care Reviewed With: patient        Progress: no change  Outcome Summary: Pt admitted from ED this shift; continues to be on RA, tolerating well; pt has been febrile, PRN tylenol and ibuprofen administered and effective; pt c/o pain, PRN medications administered and effective; pt in sinus tach, APRN aware and bolus ordered and administered; will continue to monitor

## 2021-10-02 NOTE — H&P
[facility] Medicine Admission      Date of Admission: 10/2/2021      Primary Care Physician: Beverley Dunne APRN      Chief Complaint: Feeling sick    HPI:  Patient is 31-year-old morbidly obese female with BMI of 40 with known past medical history anxiety and panic attack, diabetes mellitus type 2, her home medication include insulin but patient reports she does not take any insulin and use only Metformin, asthma presented to the ER with complaint of fever chills rhinorrhea productive cough whitish sputum body ache insomnia shortness of breath diarrhea and right-sided chest pain per ED record.  Patient was tachycardic in ED and had elevated blood sugars.  She received fluid bolus.  Patient was diagnosed with Covid and sepsis and potential urinary tract infection.  Hospitalist service was called for admission of the patient.  Patient was seen and examined in ED room 5.  Patient reports that for the last 2 weeks she had initially shortness of breath fever chills productive and nonproductive cough loss of smell and initially diarrhea.  She was diagnosed with Covid 2 days ago.  She presented to the ER concerning above complaints.  She denies any fall injury trauma headache sore throat chest pain UTI-like symptoms.  Have diarrhea resolved with time.  During encounter was noted that patient has right flank pain.  She denies any right-sided chest pain.  Patient had decreased p.o. intake.  Patient takes Metformin for diabetes.  Patient is ill-looking.  Patient is on room air.      Concurrent Medical History:  has a past medical history of Anxiety, Asthma, Diabetes mellitus (HCC), and Gestational diabetes.    Past Surgical History:  has a past surgical history that includes Ankle surgery (Left, 2010) and [place] tooth extraction.    Family History: family history includes Cancer in her mother; Diabetes in her maternal grandmother; Heart disease in her mother.     Social History:  " reports that she has been smoking cigarettes. She has been smoking about 1.00 pack per day. She has never used smokeless tobacco. She reports that she does not drink alcohol and does not use drugs.    Allergies: No Known Allergies    Medications:   Prior to Admission medications    Medication Sig Start Date End Date Taking? Authorizing Provider   albuterol sulfate HFA (Ventolin HFA) 108 (90 Base) MCG/ACT inhaler Inhale 2 puffs Every 4 (Four) Hours As Needed for Wheezing. 7/21/20   Gutierrez, ALDAIR Pierre   Alcohol Swabs (ALCOHOL WIPES) 70 % pads Use 4 x daily 7/30/20   Jude Meeks MD   B-D ULTRA-FINE 33 LANCETS misc Use 4 x daily , any brand covered by insurance 7/30/20   Jude Meeks MD   Blood Glucose Monitoring Suppl w/Device kit USE AS INDICATED, ANY MONITOR , ICD10 code is E11.9 7/30/20   Jude Meeks MD   Ertugliflozin L-PyroglutamicAc (Steglatro) 5 MG tablet Take 1 tablet by mouth Every Morning. 7/30/20   Jude Meeks MD   Glucose Blood (BLOOD GLUCOSE TEST) strip Use 4 x daily use any brand covered by insurance or same brand as before ICD10 code is E11.9 7/30/20   Jude Meeks MD   Insulin Glargine (BASAGLAR KWIKPEN) 100 UNIT/ML injection pen 15 units at night 7/30/20   Jude Meeks MD   insulin lispro (Admelog) 100 UNIT/ML injection Inject 100 Units under the skin into the appropriate area as directed Daily. 9/3/20   Jude Meeks MD   Insulin Pen Needle (PEN NEEDLES) 32G X 4 MM misc 1 each 4 (Four) Times a Day. Use 4 x daily, Dx code E11.65 7/30/20   Jude Meeks MD   Insulin Syringe 31G X 5/16\" 0.5 ML misc Use 4 x daily 10/28/19   Jude Meeks MD   Insulin Syringes, Disposable, U-100 0.5 ML misc 1 each 3 (Three) Times a Day. 9/3/20   Jude Meeks MD   Lancet Devices (LANCING DEVICE) misc USE AS INDICATED TO CORRELATE WITH STRIPS AND METER 7/30/20   Jude Meeks, " MD   loratadine (CLARITIN) 10 MG tablet Take 1 tablet by mouth Daily. 7/21/20   Bebe Gutierrez APRN   medroxyPROGESTERone (DEPO-PROVERA) 150 MG/ML injection  1/16/20   Provider, MD Maulik   propranolol (INDERAL) 20 MG tablet  1/22/20   Provider, MD Maulki   pseudoephedrine (SUDAFED) 30 MG tablet Take 1 tablet by mouth 2 (Two) Times a Day. 6/6/21   Loretta Parry APRN   Semaglutide, 1 MG/DOSE, (Ozempic, 1 MG/DOSE,) 2 MG/1.5ML solution pen-injector Inject 1 mg under the skin into the appropriate area as directed 1 (One) Time Per Week. 4/23/21   Jude Meeks MD   sertraline (ZOLOFT) 100 MG tablet  12/19/19   Emergency, Nurse Klaudia, RN   sertraline (ZOLOFT) 50 MG tablet Take 1 tablet by mouth Daily. 3/5/19   Jr Prieto MD       Review of Systems:  Review of Systems   Constitutional: Positive for activity change, appetite change, fatigue and fever.   Respiratory: Positive for cough and shortness of breath.    Gastrointestinal: Positive for abdominal pain and diarrhea. Constipation: right flank pain.   Musculoskeletal: Positive for myalgias.      Otherwise complete ROS is negative except as mentioned above.    Physical Exam:   Temp:  [100 °F (37.8 °C)-101 °F (38.3 °C)] 101 °F (38.3 °C)  Heart Rate:  [114-136] 136  Resp:  [20-22] 20  BP: (113-139)/(59-79) 136/76  Physical Exam  Constitutional:       General: She is not in acute distress.     Appearance: She is obese. She is ill-appearing. She is not toxic-appearing or diaphoretic.   HENT:      Head: Normocephalic and atraumatic.      Right Ear: External ear normal.      Left Ear: External ear normal.      Nose: Nose normal.      Mouth/Throat:      Mouth: Mucous membranes are dry.      Pharynx: Oropharynx is clear.   Eyes:      Extraocular Movements: Extraocular movements intact.      Conjunctiva/sclera: Conjunctivae normal.      Pupils: Pupils are equal, round, and reactive to light.   Cardiovascular:      Rate and Rhythm: Normal rate and  regular rhythm.      Heart sounds: No murmur heard.   No friction rub. No gallop.    Pulmonary:      Effort: No respiratory distress.      Breath sounds: No stridor. No wheezing or rales.   Chest:      Chest wall: No tenderness.   Abdominal:      General: Abdomen is flat. There is no distension.      Palpations: Abdomen is soft.      Tenderness: There is no abdominal tenderness. There is no guarding or rebound.      Comments: Right flank pain, tenderness on palpitation   Musculoskeletal:         General: No swelling or tenderness.      Cervical back: No rigidity or tenderness.      Right lower leg: No edema.      Left lower leg: No edema.   Lymphadenopathy:      Cervical: No cervical adenopathy.   Skin:     General: Skin is warm and dry.      Coloration: Skin is not jaundiced.      Findings: No erythema.   Neurological:      Mental Status: She is alert and oriented to person, place, and time. Mental status is at baseline.      Sensory: No sensory deficit.      Motor: No weakness.      Coordination: Coordination normal.   Psychiatric:         Mood and Affect: Mood normal.         Behavior: Behavior normal.         Judgment: Judgment normal.           Results Reviewed:  I have personally reviewed current lab, radiology, and data and agree with results.  Lab Results (last 24 hours)     Procedure Component Value Units Date/Time    Basic Metabolic Panel [[id number]]  (Abnormal) Collected: 10/02/21 0509    Specimen: Blood Updated: 10/02/21 0545     Glucose 355 mg/dL      BUN 11 mg/dL      Creatinine 1.01 mg/dL      Sodium 129 mmol/L      Potassium 3.5 mmol/L      Chloride 92 mmol/L      CO2 21.0 mmol/L      Calcium 8.9 mg/dL      eGFR Non African Amer 64 mL/min/1.73      BUN/Creatinine Ratio 10.9     Anion Gap 16.0 mmol/L     Narrative:      GFR Normal >60  Chronic Kidney Disease <60  Kidney Failure <15      Urinalysis, Microscopic Only - Urine, Clean Catch [[id number]]  (Abnormal) Collected: 10/02/21 0359    Specimen:  Urine, Clean Catch Updated: 10/02/21 0453     RBC, UA 3-5 /HPF      WBC, UA 0-2 /HPF      Bacteria, UA Trace /HPF      Squamous Epithelial Cells, UA 0-2 /HPF      Yeast, UA Small/1+ Yeast /HPF      Hyaline Casts, UA None Seen /LPF      Methodology Manual Light Microscopy    Urinalysis With Microscopic If Indicated (No Culture) - Urine, Clean Catch [[id number]]  (Abnormal) Collected: 10/02/21 0359    Specimen: Urine, Clean Catch Updated: 10/02/21 0452     Color, UA Yellow     Appearance, UA Cloudy     pH, UA 5.5     Specific Gravity, UA 1.033     Glucose, UA >=1000 mg/dL (3+)     Ketones, UA 80 mg/dL (3+)     Bilirubin, UA Negative     Blood, UA Moderate (2+)     Protein,  mg/dL (2+)     Leuk Esterase, UA Small (1+)     Nitrite, UA Positive     Urobilinogen, UA 1.0 E.U./dL    Blood Culture - Blood, Arm, Left [[id number]] Collected: 10/02/21 0424    Specimen: Blood from Arm, Left Updated: 10/02/21 0427    Troponin [[id number]]  (Normal) Collected: 10/02/21 0312    Specimen: Blood from Arm, Right Updated: 10/02/21 0351     Troponin T <0.010 ng/mL     Narrative:      Troponin T Reference Range:  <= 0.03 ng/mL-   Negative for AMI  >0.03 ng/mL-     Abnormal for myocardial necrosis.  Clinicians would have to utilize clinical acumen, EKG, Troponin and serial changes to determine if it is an Acute Myocardial Infarction or myocardial injury due to an underlying chronic condition.       Results may be falsely decreased if patient taking Biotin.      Lactic Acid, Plasma [[id number]]  (Normal) Collected: 10/02/21 0312    Specimen: Blood from Arm, Right Updated: 10/02/21 0349     Lactate 1.6 mmol/L     Blood Culture - Blood, Arm, Right [[id number]] Collected: 10/02/21 0312    Specimen: Blood from Arm, Right Updated: 10/02/21 0326    Procalcitonin [[id number]]  (Abnormal) Collected: 10/02/21 0200    Specimen: Blood Updated: 10/02/21 0258     Procalcitonin 11.82 ng/mL     Narrative:      As a Marker for Sepsis (Non-Neonates):  "    1. <0.5 ng/mL represents a low risk of severe sepsis and/or septic shock.  2. >2 ng/mL represents a high risk of severe sepsis and/or septic shock.    As a Marker for Lower Respiratory Tract Infections that require antibiotic therapy:  PCT on Admission     Antibiotic Therapy             6-12 Hrs later  >0.5                          Strongly Recommended            >0.25 - <0.5             Recommended  0.1 - 0.25                  Discouraged                       Remeasure/reassess PCT  <0.1                         Strongly Discouraged         Remeasure/reassess PCT      As 28 day mortality risk marker: \"Change in Procalcitonin Result\" (>80% or <=80%) if Day 0 (or Day 1) and Day 4 values are available. Refer to http://www.[employer] Systemspct-calculator.com/    Change in PCT <=80 %   A decrease of PCT levels below or equal to 80% defines a positive change in PCT test result representing a higher risk for 28-day all-cause mortality of patients diagnosed with severe sepsis or septic shock.    Change in PCT >80 %   A decrease of PCT levels of more than 80% defines a negative change in PCT result representing a lower risk for 28-day all-cause mortality of patients diagnosed with severe sepsis or septic shock.              Results may be falsely decreased if patient taking Biotin.     COVID-19 and FLU A/B PCR - Swab, Nasopharynx [[id number]]  (Abnormal) Collected: 10/02/21 0219    Specimen: Swab from Nasopharynx Updated: 10/02/21 0248     COVID19 Detected     Influenza A PCR Not Detected     Influenza B PCR Not Detected    Narrative:      Fact sheet for providers: https://www.fda.gov/media/174835/download    Fact sheet for patients: https://www.fda.gov/media/308873/download    Test performed by PCR.  Influenza A and Influenza B negative results should be considered presumptive in samples that have a positive SARS-CoV-2 result.    Competitive inhibition studies showed that SARS-CoV-2 virus, when present at concentrations above " 3.6E+04 copies/mL, can inhibit the detection and amplification of influenza A and influenza B virus RNA if present at or below 1.8E+02 copies/mL or 4.9E+02 copies/mL, respectively, and may lead to false negative influenza virus results. If co-infection with influenza A or influenza B virus is suspected in samples with a positive SARS-CoV-2 result, the sample should be re-tested with another FDA cleared, approved, or authorized influenza test, if influenza virus detection would change clinical management.    Basic Metabolic Panel [[id number]]  (Abnormal) Collected: 10/02/21 0200    Specimen: Blood Updated: 10/02/21 0238     Glucose 440 mg/dL      BUN 12 mg/dL      Creatinine 1.01 mg/dL      Sodium 125 mmol/L      Potassium 3.7 mmol/L      Comment: Slight hemolysis detected by analyzer. Results may be affected.        Chloride 88 mmol/L      CO2 21.0 mmol/L      Calcium 9.6 mg/dL      eGFR Non African Amer 64 mL/min/1.73      BUN/Creatinine Ratio 11.9     Anion Gap 16.0 mmol/L     Narrative:      GFR Normal >60  Chronic Kidney Disease <60  Kidney Failure <15      Troponin [[id number]]  (Normal) Collected: 10/02/21 0200    Specimen: Blood Updated: 10/02/21 0228     Troponin T <0.010 ng/mL     Narrative:      Troponin T Reference Range:  <= 0.03 ng/mL-   Negative for AMI  >0.03 ng/mL-     Abnormal for myocardial necrosis.  Clinicians would have to utilize clinical acumen, EKG, Troponin and serial changes to determine if it is an Acute Myocardial Infarction or myocardial injury due to an underlying chronic condition.       Results may be falsely decreased if patient taking Biotin.      BNP [[id number]]  (Normal) Collected: 10/02/21 0200    Specimen: Blood Updated: 10/02/21 0226     proBNP 139.6 pg/mL     Narrative:      Among patients with dyspnea, NT-proBNP is highly sensitive for the detection of acute congestive heart failure. In addition NT-proBNP of <300 pg/ml effectively rules out acute congestive heart failure with  99% negative predictive value.    Results may be falsely decreased if patient taking Biotin.      D-dimer, Quantitative [[id number]]  (Abnormal) Collected: 10/02/21 0200    Specimen: Blood Updated: 10/02/21 0222     D-Dimer, Quantitative 2,470 ng/mL (FEU)     Narrative:      Dimer values <500 ng/ml FEU are FDA approved as aid in diagnosis of deep venous thrombosis and pulmonary embolism.  This test should not be used in an exclusion strategy with pretest probability alone.    A recent guideline regarding diagnosis for pulmonary thromboembolism recommends an adjusted exclusion criterion of age x 10 ng/ml FEU for patients >50 years of age ([employer] Intern Med 2015; 163: 701-711).      hCG, Serum, Qualitative [[id number]]  (Normal) Collected: 10/02/21 0200    Specimen: Blood Updated: 10/02/21 0217     HCG Qualitative Negative    CBC & Differential [[id number]]  (Abnormal) Collected: 10/02/21 0200    Specimen: Blood Updated: 10/02/21 0203    Narrative:      The following orders were created for panel order CBC & Differential.  Procedure                               Abnormality         Status                     ---------                               -----------         ------                     CBC Auto Differential[[id number]]        Abnormal            Final result                 Please view results for these tests on the individual orders.    CBC Auto Differential [[id number]]  (Abnormal) Collected: 10/02/21 0200    Specimen: Blood Updated: 10/02/21 0203     WBC 19.92 10*3/mm3      RBC 4.78 10*6/mm3      Hemoglobin 14.4 g/dL      Hematocrit 40.5 %      MCV 84.7 fL      MCH 30.1 pg      MCHC 35.6 g/dL      RDW 12.4 %      RDW-SD 38.2 fl      MPV 10.4 fL      Platelets 152 10*3/mm3      Neutrophil % 89.7 %      Lymphocyte % 2.5 %      Monocyte % 5.2 %      Eosinophil % 0.1 %      Basophil % 0.4 %      Immature Grans % 2.1 %      Neutrophils, Absolute 17.88 10*3/mm3      Lymphocytes, Absolute 0.50 10*3/mm3      Monocytes,  Absolute 1.03 10*3/mm3      Eosinophils, Absolute 0.02 10*3/mm3      Basophils, Absolute 0.07 10*3/mm3      Immature Grans, Absolute 0.42 10*3/mm3      nRBC 0.0 /100 WBC         Imaging Results (Last 24 Hours)     Procedure Component Value Units Date/Time    CT Angiogram Chest [[id number]] Collected: 10/02/21 0320     Updated: 10/02/21 0401    Narrative:      EXAM:    CT Angiography Chest With Intravenous Contrast    CLINICAL HISTORY:    The patient is 31 years old and is Female; soa    TECHNIQUE:    Axial computed tomographic angiography images of the chest with  intravenous contrast.  Sagittal and coronal reformatted images  were created and reviewed.  This CT exam was performed using one  or more of the following dose reduction techniques:  automated  exposure control, adjustment of the mA and/or kV according to  patient size, and/or use of iterative reconstruction technique.    MIP reconstructed images were created and reviewed.    COMPARISON:    No relevant prior studies available.    FINDINGS:    PULMONARY ARTERIES:  There are no obvious filling defects  identified within the pulmonary arteries to suggest pulmonary  embolism.    AORTA:  No acute findings.  No thoracic aortic aneurysm.    LUNGS:  Area of linear atelectasis in the lingula and right  middle lobe are present. The lungs are otherwise clear. The  tracheobronchial tree is widely patent.  No mass.    PLEURAL SPACE:  Unremarkable.  No significant effusion.  No  pneumothorax.    HEART:  Unremarkable.  No cardiomegaly.  No significant  pericardial effusion.  No evidence of RV dysfunction.    BONES/JOINTS:  No acute fracture.  No dislocation.    SOFT TISSUES:  Unremarkable.    LYMPH NODES:  Unremarkable.  No enlarged lymph nodes.    LIVER:  The liver is enlarged and diffusely fatty.      Impression:      1.  No evidence of pulmonary embolism.  2.  Findings suggest areas of atelectasis within the right middle  lobe and lingula.    Electronically signed by:   Shanon Bethea MD  10/2/2021 4:00 AM  CDT Workstation: [id number]    XR Chest 1 View [[id number]] Collected: 10/02/21 0153     Updated: 10/02/21 0213    Narrative:      EXAM:    XR Chest, 1 View    CLINICAL HISTORY:    The patient is 31 years old and is Female; soa    TECHNIQUE:    Frontal view of the chest.    COMPARISON:    No relevant prior studies available.    FINDINGS:    LUNGS:  Unremarkable.  No consolidation.    PLEURAL SPACE:  Unremarkable.  No pneumothorax.    HEART:  Unremarkable.  No cardiomegaly.    MEDIASTINUM:  Unremarkable.    BONES/JOINTS:  Unremarkable.    UPPER ABDOMEN:  Unremarkable as visualized.      Impression:        No acute cardiopulmonary process.    Electronically signed by:  Shanon Bethea MD  10/2/2021 2:11 AM  CDT Workstation: [id number]            Assessment:    Active Hospital Problems    Diagnosis    • COVID-19 virus infection      # Sepsis, present on admission, concern for sepsis considering significantly elevated procalcitonin level  # Possible pneumonia, community acquired pneumonia cannot be excluded however CTA only suggestive of atelectatic changes  # Right flank pain unclear etiology  # UTI, concerning right pyelonephritis  # Hyponatremia, pseudohyponatremia  # Dehydration   # Uncontrolled diabetes mellitus type 2 with hyperglycemia  # Leukocytosis reactive  # Anxiety and panic attack   # Morbid obesity with BMI of 40   # Significantly elevated procalcitonin level suggestive of sepsis  # COVID-19 infection initial symptoms, 2 weeks old, CT of the chest not suggestive of Covid like pneumonia, patient on room air  # Elevated D-dimer level in setting of infection, sepsis potential bacterial infection          Plan:  Admit to inpatient medical service  Placed on telemetry  Blood cultures were obtained in ED  Add urine culture to current UA.  Informed laboratory department in this regard  Obtain stat CT of the abdomen and pelvis considering right flank pain.  Placed on  broad-spectrum IV antibiotic Zosyn, Levaquin, vancomycin concerning sepsis, potential severe sepsis, potential pneumonia versus urinary tract infection as source of pneumonia  Give fluid bolus  Placed on high rate IV fluid  Placed on insulin sliding scale  Comorbidities will be treated appropriately  Further decision-making depending on response to above care.  Please see orders for comprehensive plan.    Addendum: 7:33 AM  Severe right-sided pyelonephritis  I discussed with radiology service.  CT of the abdomen and pelvis suggestive of severe right-sided pyelonephritis without hydronephrosis.  Official CT of the abdomen and pelvis report pending.  Defer to medical care provider in a.m. any need urology service consultation upon availability of results of official report of CT of the abdomen and pelvis.    I confirmed that the patient's Advance Care Plan is present, code status is documented, or surrogate decision maker is listed in the patient's medical record.     I have utilized all available immediate resources to obtain, update, or review the patient's current medications.     I discussed the patient's findings and my recommendations with: She agreed with above plan of care.      Saeid Behroozi, MD   10/02/21   06:22 CDT

## 2021-10-02 NOTE — PROGRESS NOTES
[facility] Medicine Services  INPATIENT PROGRESS NOTE    Length of Stay: 0  Date of Admission: 10/2/2021  Primary Care Physician: Beverley Dunne APRN    Subjective   Chief Complaint: fever, flank pain  HPI:  31 year old female with a history of DMII and obesity who presented with fever, chills, cough, and right sided flank/chest pain.  She was COVID-19 positive and UA was consistent with UTI.  CT of the abdomen revealed findings consistent with severe pyelonephritis.      Review of Systems   Constitutional: Positive for chills and fever.   Respiratory: Positive for cough. Negative for shortness of breath.    Cardiovascular: Negative for chest pain.   Gastrointestinal: Positive for nausea. Negative for abdominal pain and vomiting.   Genitourinary: Positive for dysuria and flank pain.        All pertinent negatives and positives are as above. All other systems have been reviewed and are negative unless otherwise stated.     Objective    Temp:  [99.7 °F (37.6 °C)-101 °F (38.3 °C)] 100 °F (37.8 °C)  Heart Rate:  [113-136] 119  Resp:  [16-22] 16  BP: (113-139)/(59-79) 121/78    Physical Exam  Vitals reviewed.   Constitutional:       General: She is not in acute distress.     Appearance: She is well-developed. She is ill-appearing, toxic-appearing and diaphoretic.   HENT:      Head: Normocephalic and atraumatic.   Eyes:      Conjunctiva/sclera: Conjunctivae normal.   Cardiovascular:      Rate and Rhythm: Regular rhythm. Tachycardia present.   Pulmonary:      Effort: Pulmonary effort is normal. No respiratory distress.      Breath sounds: Normal breath sounds.   Abdominal:      General: Bowel sounds are normal. There is no distension.      Palpations: Abdomen is soft.      Tenderness: There is abdominal tenderness (right sided).   Musculoskeletal:         General: Tenderness (right flank and CVA) present. No swelling or deformity.   Skin:     General: Skin is warm.    Neurological:      Mental Status: She is alert and oriented to person, place, and time.             Results Review:  I have reviewed the labs, radiology results, and diagnostic studies.    Laboratory Data:   Results from last 7 days   Lab Units 10/02/21  0509 10/02/21  0200   SODIUM mmol/L 129* 125*   POTASSIUM mmol/L 3.5 3.7   CHLORIDE mmol/L 92* 88*   CO2 mmol/L 21.0* 21.0*   BUN mg/dL 11 12   CREATININE mg/dL 1.01* 1.01*   GLUCOSE mg/dL 355* 440*   CALCIUM mg/dL 8.9 9.6   ANION GAP mmol/L 16.0* 16.0*     Estimated Creatinine Clearance: 92.8 mL/min (A) (by C-G formula based on SCr of 1.01 mg/dL (H)).          Results from last 7 days   Lab Units 10/02/21  0200   WBC 10*3/mm3 19.92*   HEMOGLOBIN g/dL 14.4   HEMATOCRIT % 40.5   PLATELETS 10*3/mm3 152           Culture Data:   No results found for: BLOODCX  No results found for: URINECX  No results found for: RESPCX  No results found for: WOUNDCX  No results found for: STOOLCX  No components found for: BODYFLD    Radiology Data:   Imaging Results (Last 24 Hours)     Procedure Component Value Units Date/Time    CT Abdomen Pelvis Without Contrast [[id number]] Collected: 10/02/21 0705     Updated: 10/02/21 0736    Narrative:      CT abdomen, pelvis without contrast.       CLINICAL INDICATION: Right flank pain. Fever and sepsis.     COMPARISON: CT chest October 2, 2021     TECHNIQUE: Noncontrast helical scanning with axial and coronal  reformations. Soft tissue, lung, and bone windows reviewed.    This exam was performed according to our departmental  dose-optimization program, which includes automated exposure  control, adjustment of the mA and/or kV according to patient size  and/or use of iterative reconstruction technique.    ABDOMEN CT FINDINGS: Fatty liver. Liver is otherwise normal.  Enlargement of right kidney. Retention of contrast in right  kidney from recent CT chest exam. Abnormal striated appearance  diagnostic for severe pyelonephritis. Perinephric  inflammatory  changes. No evidence of hydronephrotic changes.    Normal left kidney by comparison.    Normal bowel. Normal appendix. Contrast within a normal-appearing  urinary bladder from recent CT chest exam.      Impression:      Abnormal enlarged right kidney with abnormal striated  type enhancement and perinephric inflammatory changes diagnostic  for severe pyelonephritis. No evidence of a hydronephrotic  changes.    These findings discussed with Dr. Behroozi at 7:34 AM.    Electronically signed by:  Jorge Luis Lombardo MD  10/2/2021 7:35 AM CDT  Workstation: [id number]    CT Angiogram Chest [[id number]] Collected: 10/02/21 0320     Updated: 10/02/21 0401    Narrative:      EXAM:    CT Angiography Chest With Intravenous Contrast    CLINICAL HISTORY:    The patient is 31 years old and is Female; soa    TECHNIQUE:    Axial computed tomographic angiography images of the chest with  intravenous contrast.  Sagittal and coronal reformatted images  were created and reviewed.  This CT exam was performed using one  or more of the following dose reduction techniques:  automated  exposure control, adjustment of the mA and/or kV according to  patient size, and/or use of iterative reconstruction technique.    MIP reconstructed images were created and reviewed.    COMPARISON:    No relevant prior studies available.    FINDINGS:    PULMONARY ARTERIES:  There are no obvious filling defects  identified within the pulmonary arteries to suggest pulmonary  embolism.    AORTA:  No acute findings.  No thoracic aortic aneurysm.    LUNGS:  Area of linear atelectasis in the lingula and right  middle lobe are present. The lungs are otherwise clear. The  tracheobronchial tree is widely patent.  No mass.    PLEURAL SPACE:  Unremarkable.  No significant effusion.  No  pneumothorax.    HEART:  Unremarkable.  No cardiomegaly.  No significant  pericardial effusion.  No evidence of RV dysfunction.    BONES/JOINTS:  No acute fracture.  No dislocation.     SOFT TISSUES:  Unremarkable.    LYMPH NODES:  Unremarkable.  No enlarged lymph nodes.    LIVER:  The liver is enlarged and diffusely fatty.      Impression:      1.  No evidence of pulmonary embolism.  2.  Findings suggest areas of atelectasis within the right middle  lobe and lingula.    Electronically signed by:  Shanon Bethea MD  10/2/2021 4:00 AM  CDT Workstation: [id number]    XR Chest 1 View [[id number]] Collected: 10/02/21 0153     Updated: 10/02/21 0213    Narrative:      EXAM:    XR Chest, 1 View    CLINICAL HISTORY:    The patient is 31 years old and is Female; soa    TECHNIQUE:    Frontal view of the chest.    COMPARISON:    No relevant prior studies available.    FINDINGS:    LUNGS:  Unremarkable.  No consolidation.    PLEURAL SPACE:  Unremarkable.  No pneumothorax.    HEART:  Unremarkable.  No cardiomegaly.    MEDIASTINUM:  Unremarkable.    BONES/JOINTS:  Unremarkable.    UPPER ABDOMEN:  Unremarkable as visualized.      Impression:        No acute cardiopulmonary process.    Electronically signed by:  Shanon Bethea MD  10/2/2021 2:11 AM  CDT Workstation: [id number]          I have reviewed the patient's current medications.     Assessment/Plan     Active Hospital Problems    Diagnosis    • **Sepsis due to urinary tract infection (HCC)    • Acute pyelonephritis    • COVID-19 virus infection    • Obesity (BMI 30-39.9)    • Type 2 diabetes mellitus without complication, without long-term current use of insulin (HCC)        Plan:    Levaquin  Follow cultures  IV fluid:  ml/hr  Tylenol for fever  No shortness of breath. Does not require oxygen.  No indication for remdesivir or steroids.  Glucose control: SSI 0-14 units  VTE PPx: lovenox  I confirmed that the patient's Advance Care Plan is present, code status is documented, or surrogate decision maker is listed in the patient's medical record.     The patient was evaluated during the global COVID-19 pandemic, and the diagnosis was suspected/considered  upon their initial presentation.  Evaluation, treatment, and testing were consistent with current guidelines for patients who present with complaints or symptoms that may be related to COVID-19.          This document has been electronically signed by LADAIR Wilkes on October 2, 2021 13:23 CDT

## 2021-10-02 NOTE — ED PROVIDER NOTES
"Subjective   32yo female pmh significant dm2/obesity presents ED c/o 4d hx subjective fever/chills/rhinorrhea/productive cough \"white\" sputum/myalgias/anosmia/dysguesia/soa.  ROS (+) diarrhea, right sided chest pain.  Denies abdominal pain/vomiting/dysuria.  Pt reports recent covid-19 (+) test at urgent care 2d previously.      History provided by:  Patient  URI  Presenting symptoms: congestion, cough, fatigue, fever and rhinorrhea    Severity:  Moderate  Onset quality:  Gradual  Duration:  4 days      Review of Systems   Constitutional: Positive for fatigue and fever.   HENT: Positive for congestion and rhinorrhea.    Eyes: Negative for redness.   Respiratory: Positive for cough and shortness of breath.    Cardiovascular: Positive for chest pain.   Gastrointestinal: Positive for diarrhea. Negative for abdominal pain, nausea and vomiting.   Genitourinary: Negative.    Musculoskeletal: Negative.    Allergic/Immunologic: Negative for immunocompromised state.   All other systems reviewed and are negative.      Past Medical History:   Diagnosis Date   • Anxiety    • Asthma    • Diabetes mellitus (HCC)     Type 2   • Gestational diabetes     controlled with Insulin        No Known Allergies    Past Surgical History:   Procedure Laterality Date   • ANKLE SURGERY Left 2010   • WISDOM TOOTH EXTRACTION         Family History   Problem Relation Age of Onset   • Cancer Mother    • Heart disease Mother    • Diabetes Maternal Grandmother        Social History     Socioeconomic History   • Marital status:      Spouse name: Not on file   • Number of children: Not on file   • Years of education: Not on file   • Highest education level: Not on file   Tobacco Use   • Smoking status: Current Every Day Smoker     Packs/day: 1.00     Types: Cigarettes   • Smokeless tobacco: Never Used   • Tobacco comment: 1*800*quit*now   Substance and Sexual Activity   • Alcohol use: No   • Drug use: No   • Sexual activity: Yes     Partners: " Male     Birth control/protection: None           Objective   Physical Exam  Vitals and nursing note reviewed.   Constitutional:       Appearance: Normal appearance.   HENT:      Head: Normocephalic and atraumatic.      Nose: Nose normal.      Mouth/Throat:      Mouth: Mucous membranes are moist.   Cardiovascular:      Rate and Rhythm: Regular rhythm. Tachycardia present.      Pulses: Normal pulses.      Heart sounds: Normal heart sounds. No murmur heard.   No friction rub. No gallop.    Pulmonary:      Effort: Pulmonary effort is normal. No respiratory distress.      Breath sounds: Normal breath sounds. No wheezing, rhonchi or rales.   Abdominal:      General: Bowel sounds are normal. There is no distension.      Palpations: Abdomen is soft.      Tenderness: There is no abdominal tenderness. There is no guarding or rebound.   Musculoskeletal:         General: No swelling or deformity.      Cervical back: Normal range of motion and neck supple. No rigidity.   Lymphadenopathy:      Cervical: No cervical adenopathy.   Skin:     General: Skin is warm and dry.   Neurological:      General: No focal deficit present.      Mental Status: She is alert and oriented to person, place, and time.      GCS: GCS eye subscore is 4. GCS verbal subscore is 5. GCS motor subscore is 6.         ECG 12 Lead      Date/Time: 10/2/2021 3:03 AM  Performed by: William Anguiano MD  Authorized by: William Anguiano MD   Interpreted by physician  Rhythm: sinus tachycardia  Rate: tachycardic  BPM: 133  QRS axis: normal  Conduction: conduction normal  ST Segments: ST segments normal  T Waves: T waves normal  Other: no other findings  Clinical impression: normal ECG                 ED Course      Labs Reviewed   COVID-19 AND FLU A/B, NP SWAB IN TRANSPORT MEDIA 8-12 HR TAT - Abnormal; Notable for the following components:       Result Value    COVID19 Detected (*)     All other components within normal limits    Narrative:     Fact sheet for providers:  https://www.fda.gov/media/821575/download    Fact sheet for patients: https://www.fda.gov/media/556923/download    Test performed by PCR.  Influenza A and Influenza B negative results should be considered presumptive in samples that have a positive SARS-CoV-2 result.    Competitive inhibition studies showed that SARS-CoV-2 virus, when present at concentrations above 3.6E+04 copies/mL, can inhibit the detection and amplification of influenza A and influenza B virus RNA if present at or below 1.8E+02 copies/mL or 4.9E+02 copies/mL, respectively, and may lead to false negative influenza virus results. If co-infection with influenza A or influenza B virus is suspected in samples with a positive SARS-CoV-2 result, the sample should be re-tested with another FDA cleared, approved, or authorized influenza test, if influenza virus detection would change clinical management.   BASIC METABOLIC PANEL - Abnormal; Notable for the following components:    Glucose 440 (*)     Creatinine 1.01 (*)     Sodium 125 (*)     Chloride 88 (*)     CO2 21.0 (*)     Anion Gap 16.0 (*)     All other components within normal limits    Narrative:     GFR Normal >60  Chronic Kidney Disease <60  Kidney Failure <15     URINALYSIS W/ MICROSCOPIC IF INDICATED (NO CULTURE) - Abnormal; Notable for the following components:    Appearance, UA Cloudy (*)     Specific Gravity, UA 1.033 (*)     Glucose, UA >=1000 mg/dL (3+) (*)     Ketones, UA 80 mg/dL (3+) (*)     Blood, UA Moderate (2+) (*)     Protein,  mg/dL (2+) (*)     Leuk Esterase, UA Small (1+) (*)     Nitrite, UA Positive (*)     All other components within normal limits   PROCALCITONIN - Abnormal; Notable for the following components:    Procalcitonin 11.82 (*)     All other components within normal limits    Narrative:     As a Marker for Sepsis (Non-Neonates):     1. <0.5 ng/mL represents a low risk of severe sepsis and/or septic shock.  2. >2 ng/mL represents a high risk of severe sepsis  "and/or septic shock.    As a Marker for Lower Respiratory Tract Infections that require antibiotic therapy:  PCT on Admission     Antibiotic Therapy             6-12 Hrs later  >0.5                          Strongly Recommended            >0.25 - <0.5             Recommended  0.1 - 0.25                  Discouraged                       Remeasure/reassess PCT  <0.1                         Strongly Discouraged         Remeasure/reassess PCT      As 28 day mortality risk marker: \"Change in Procalcitonin Result\" (>80% or <=80%) if Day 0 (or Day 1) and Day 4 values are available. Refer to http://[email or web address] [place]-pct-calculator.[email or web address]/    Change in PCT <=80 %   A decrease of PCT levels below or equal to 80% defines a positive change in PCT test result representing a higher risk for 28-day all-cause mortality of patients diagnosed with severe sepsis or septic shock.    Change in PCT >80 %   A decrease of PCT levels of more than 80% defines a negative change in PCT result representing a lower risk for 28-day all-cause mortality of patients diagnosed with severe sepsis or septic shock.              Results may be falsely decreased if patient taking Biotin.    D-DIMER, QUANTITATIVE - Abnormal; Notable for the following components:    D-Dimer, Quantitative 2,470 (*)     All other components within normal limits    Narrative:     Dimer values <500 ng/ml FEU are FDA approved as aid in diagnosis of deep venous thrombosis and pulmonary embolism.  This test should not be used in an exclusion strategy with pretest probability alone.    A recent guideline regarding diagnosis for pulmonary thromboembolism recommends an adjusted exclusion criterion of age x 10 ng/ml FEU for patients >50 years of age ([employer] Intern Med 2015; 163: 701-711).     CBC WITH AUTO DIFFERENTIAL - Abnormal; Notable for the following components:    WBC 19.92 (*)     Neutrophil % 89.7 (*)     Lymphocyte % 2.5 (*)     Eosinophil % 0.1 (*)     Immature Grans % 2.1 (*)     " Neutrophils, Absolute 17.88 (*)     Lymphocytes, Absolute 0.50 (*)     Monocytes, Absolute 1.03 (*)     Immature Grans, Absolute 0.42 (*)     All other components within normal limits   URINALYSIS, MICROSCOPIC ONLY - Abnormal; Notable for the following components:    RBC, UA 3-5 (*)     Bacteria, UA Trace (*)     All other components within normal limits   BASIC METABOLIC PANEL - Abnormal; Notable for the following components:    Glucose 355 (*)     Creatinine 1.01 (*)     Sodium 129 (*)     Chloride 92 (*)     CO2 21.0 (*)     Anion Gap 16.0 (*)     All other components within normal limits    Narrative:     GFR Normal >60  Chronic Kidney Disease <60  Kidney Failure <15     HCG, SERUM, QUALITATIVE - Normal   TROPONIN (IN-HOUSE) - Normal    Narrative:     Troponin T Reference Range:  <= 0.03 ng/mL-   Negative for AMI  >0.03 ng/mL-     Abnormal for myocardial necrosis.  Clinicians would have to utilize clinical acumen, EKG, Troponin and serial changes to determine if it is an Acute Myocardial Infarction or myocardial injury due to an underlying chronic condition.       Results may be falsely decreased if patient taking Biotin.     TROPONIN (IN-HOUSE) - Normal    Narrative:     Troponin T Reference Range:  <= 0.03 ng/mL-   Negative for AMI  >0.03 ng/mL-     Abnormal for myocardial necrosis.  Clinicians would have to utilize clinical acumen, EKG, Troponin and serial changes to determine if it is an Acute Myocardial Infarction or myocardial injury due to an underlying chronic condition.       Results may be falsely decreased if patient taking Biotin.     BNP (IN-HOUSE) - Normal    Narrative:     Among patients with dyspnea, NT-proBNP is highly sensitive for the detection of acute congestive heart failure. In addition NT-proBNP of <300 pg/ml effectively rules out acute congestive heart failure with 99% negative predictive value.    Results may be falsely decreased if patient taking Biotin.     LACTIC ACID, PLASMA -  Normal   BLOOD CULTURE   BLOOD CULTURE   CBC AND DIFFERENTIAL    Narrative:     The following orders were created for panel order CBC & Differential.  Procedure                               Abnormality         Status                     ---------                               -----------         ------                     CBC Auto Differential[[id number]]        Abnormal            Final result                 Please view results for these tests on the individual orders.     XR Chest 1 View    Result Date: 10/2/2021  Narrative: EXAM:   XR Chest, 1 View CLINICAL HISTORY:   The patient is 31 years old and is Female; soa TECHNIQUE:   Frontal view of the chest. COMPARISON:   No relevant prior studies available. FINDINGS:   LUNGS:  Unremarkable.  No consolidation.   PLEURAL SPACE:  Unremarkable.  No pneumothorax.   HEART:  Unremarkable.  No cardiomegaly.   MEDIASTINUM:  Unremarkable.   BONES/JOINTS:  Unremarkable.   UPPER ABDOMEN:  Unremarkable as visualized.     Impression:   No acute cardiopulmonary process. Electronically signed by:  Shanon Bethea MD  10/2/2021 2:11 AM CDT Workstation: [id number]    CT Angiogram Chest    Result Date: 10/2/2021  Narrative: EXAM:   CT Angiography Chest With Intravenous Contrast CLINICAL HISTORY:   The patient is 31 years old and is Female; soa TECHNIQUE:   Axial computed tomographic angiography images of the chest with intravenous contrast.  Sagittal and coronal reformatted images were created and reviewed.  This CT exam was performed using one or more of the following dose reduction techniques:  automated exposure control, adjustment of the mA and/or kV according to patient size, and/or use of iterative reconstruction technique.   MIP reconstructed images were created and reviewed. COMPARISON:   No relevant prior studies available. FINDINGS:   PULMONARY ARTERIES:  There are no obvious filling defects identified within the pulmonary arteries to suggest pulmonary embolism.   AORTA:  No  acute findings.  No thoracic aortic aneurysm.   LUNGS:  Area of linear atelectasis in the lingula and right middle lobe are present. The lungs are otherwise clear. The tracheobronchial tree is widely patent.  No mass.   PLEURAL SPACE:  Unremarkable.  No significant effusion.  No pneumothorax.   HEART:  Unremarkable.  No cardiomegaly.  No significant pericardial effusion.  No evidence of RV dysfunction.   BONES/JOINTS:  No acute fracture.  No dislocation.   SOFT TISSUES:  Unremarkable.   LYMPH NODES:  Unremarkable.  No enlarged lymph nodes.   LIVER:  The liver is enlarged and diffusely fatty.     Impression: 1.  No evidence of pulmonary embolism. 2.  Findings suggest areas of atelectasis within the right middle lobe and lingula. Electronically signed by:  Shanon Bethea MD  10/2/2021 4:00 AM CDT Workstation: [id number]                                         [facility]    Final diagnoses:   COVID-19 virus infection   Sepsis, due to unspecified organism, unspecified whether acute organ dysfunction present (HCC)   UTI (urinary tract infection), bacterial   Type 2 diabetes mellitus with hyperglycemia, unspecified whether long term insulin use (HCC)       ED Disposition  ED Disposition     ED Disposition Condition Comment    Decision to Admit  Level of Care: Telemetry [5]   Admitting Physician: BEHROOZI, SAEID [[id number]]   Attending Physician: BEHROOZI, SAEID [[id number]]   Patient Class: Inpatient [101]            No follow-up provider specified.       Medication List      No changes were made to your prescriptions during this visit.          William Anguiano MD  10/02/21 0600

## 2021-10-03 ENCOUNTER — APPOINTMENT (OUTPATIENT)
Dept: GENERAL RADIOLOGY | Facility: HOSPITAL | Age: 31
End: 2021-10-03

## 2021-10-03 ENCOUNTER — APPOINTMENT (OUTPATIENT)
Dept: INTERVENTIONAL RADIOLOGY/VASCULAR | Facility: HOSPITAL | Age: 31
End: 2021-10-03

## 2021-10-03 ENCOUNTER — APPOINTMENT (OUTPATIENT)
Dept: ULTRASOUND IMAGING | Facility: HOSPITAL | Age: 31
End: 2021-10-03

## 2021-10-03 PROBLEM — R78.81 E COLI BACTEREMIA: Status: ACTIVE | Noted: 2021-10-03

## 2021-10-03 PROBLEM — B96.20 E COLI BACTEREMIA: Status: ACTIVE | Noted: 2021-10-03

## 2021-10-03 LAB
ALBUMIN SERPL-MCNC: 3 G/DL (ref 3.5–5.2)
ALBUMIN/GLOB SERPL: 0.9 G/DL
ALP SERPL-CCNC: 122 U/L (ref 39–117)
ALT SERPL W P-5'-P-CCNC: 17 U/L (ref 1–33)
ANION GAP SERPL CALCULATED.3IONS-SCNC: 11 MMOL/L (ref 5–15)
AST SERPL-CCNC: 15 U/L (ref 1–32)
BILIRUB CONJ SERPL-MCNC: 0.4 MG/DL (ref 0–0.3)
BILIRUB SERPL-MCNC: 0.7 MG/DL (ref 0–1.2)
BUN SERPL-MCNC: 13 MG/DL (ref 6–20)
BUN/CREAT SERPL: 14.6 (ref 7–25)
CALCIUM SPEC-SCNC: 8.7 MG/DL (ref 8.6–10.5)
CHLORIDE SERPL-SCNC: 97 MMOL/L (ref 98–107)
CO2 SERPL-SCNC: 21 MMOL/L (ref 22–29)
CREAT SERPL-MCNC: 0.89 MG/DL (ref 0.57–1)
DEPRECATED RDW RBC AUTO: 40.3 FL (ref 37–54)
ERYTHROCYTE [DISTWIDTH] IN BLOOD BY AUTOMATED COUNT: 13 % (ref 12.3–15.4)
GFR SERPL CREATININE-BSD FRML MDRD: 74 ML/MIN/1.73
GLOBULIN UR ELPH-MCNC: 3.5 GM/DL
GLUCOSE BLDC GLUCOMTR-MCNC: 265 MG/DL (ref 70–130)
GLUCOSE BLDC GLUCOMTR-MCNC: 285 MG/DL (ref 70–130)
GLUCOSE BLDC GLUCOMTR-MCNC: 331 MG/DL (ref 70–130)
GLUCOSE BLDC GLUCOMTR-MCNC: 340 MG/DL (ref 70–130)
GLUCOSE BLDC GLUCOMTR-MCNC: 360 MG/DL (ref 70–130)
GLUCOSE BLDC GLUCOMTR-MCNC: 365 MG/DL (ref 70–130)
GLUCOSE BLDC GLUCOMTR-MCNC: 375 MG/DL (ref 70–130)
GLUCOSE BLDC GLUCOMTR-MCNC: 401 MG/DL (ref 70–130)
GLUCOSE BLDC GLUCOMTR-MCNC: 411 MG/DL (ref 70–130)
GLUCOSE BLDC GLUCOMTR-MCNC: 434 MG/DL (ref 70–130)
GLUCOSE SERPL-MCNC: 221 MG/DL (ref 65–99)
HBA1C MFR BLD: 9.4 % (ref 4.8–5.6)
HCT VFR BLD AUTO: 30.2 % (ref 34–46.6)
HGB BLD-MCNC: 10.5 G/DL (ref 12–15.9)
MAGNESIUM SERPL-MCNC: 1.7 MG/DL (ref 1.6–2.6)
MCH RBC QN AUTO: 30 PG (ref 26.6–33)
MCHC RBC AUTO-ENTMCNC: 34.8 G/DL (ref 31.5–35.7)
MCV RBC AUTO: 86.3 FL (ref 79–97)
PLATELET # BLD AUTO: 106 10*3/MM3 (ref 140–450)
PMV BLD AUTO: 10.4 FL (ref 6–12)
POTASSIUM SERPL-SCNC: 3.6 MMOL/L (ref 3.5–5.2)
PROT SERPL-MCNC: 6.5 G/DL (ref 6–8.5)
RBC # BLD AUTO: 3.5 10*6/MM3 (ref 3.77–5.28)
SODIUM SERPL-SCNC: 129 MMOL/L (ref 136–145)
T4 FREE SERPL-MCNC: 0.88 NG/DL (ref 0.93–1.7)
TSH SERPL DL<=0.05 MIU/L-ACNC: 1.77 UIU/ML (ref 0.27–4.2)
WBC # BLD AUTO: 8.21 10*3/MM3 (ref 3.4–10.8)

## 2021-10-03 PROCEDURE — 63710000001 INSULIN ASPART PER 5 UNITS: Performed by: NURSE PRACTITIONER

## 2021-10-03 PROCEDURE — 94799 UNLISTED PULMONARY SVC/PX: CPT

## 2021-10-03 PROCEDURE — 25010000002 CEFEPIME PER 500 MG: Performed by: NURSE PRACTITIONER

## 2021-10-03 PROCEDURE — C1751 CATH, INF, PER/CENT/MIDLINE: HCPCS

## 2021-10-03 PROCEDURE — 83036 HEMOGLOBIN GLYCOSYLATED A1C: CPT | Performed by: INTERNAL MEDICINE

## 2021-10-03 PROCEDURE — 25010000002 SODIUM CHLORIDE 0.9 % WITH KCL 20 MEQ 20-0.9 MEQ/L-% SOLUTION: Performed by: INTERNAL MEDICINE

## 2021-10-03 PROCEDURE — 63710000001 PROMETHAZINE PER 12.5 MG: Performed by: INTERNAL MEDICINE

## 2021-10-03 PROCEDURE — 25010000002 DEXAMETHASONE PER 1 MG: Performed by: NURSE PRACTITIONER

## 2021-10-03 PROCEDURE — 63710000001 INSULIN DETEMIR PER 5 UNITS: Performed by: NURSE PRACTITIONER

## 2021-10-03 PROCEDURE — 25010000002 MORPHINE PER 10 MG: Performed by: NURSE PRACTITIONER

## 2021-10-03 PROCEDURE — 84439 ASSAY OF FREE THYROXINE: CPT | Performed by: INTERNAL MEDICINE

## 2021-10-03 PROCEDURE — 94640 AIRWAY INHALATION TREATMENT: CPT

## 2021-10-03 PROCEDURE — 36410 VNPNXR 3YR/> PHY/QHP DX/THER: CPT

## 2021-10-03 PROCEDURE — 94760 N-INVAS EAR/PLS OXIMETRY 1: CPT

## 2021-10-03 PROCEDURE — 76937 US GUIDE VASCULAR ACCESS: CPT

## 2021-10-03 PROCEDURE — 84443 ASSAY THYROID STIM HORMONE: CPT | Performed by: INTERNAL MEDICINE

## 2021-10-03 PROCEDURE — 82248 BILIRUBIN DIRECT: CPT | Performed by: NURSE PRACTITIONER

## 2021-10-03 PROCEDURE — 25010000002 LEVOFLOXACIN PER 250 MG: Performed by: INTERNAL MEDICINE

## 2021-10-03 PROCEDURE — 80053 COMPREHEN METABOLIC PANEL: CPT | Performed by: INTERNAL MEDICINE

## 2021-10-03 PROCEDURE — 63710000001 INSULIN ASPART PER 5 UNITS: Performed by: INTERNAL MEDICINE

## 2021-10-03 PROCEDURE — 71045 X-RAY EXAM CHEST 1 VIEW: CPT

## 2021-10-03 PROCEDURE — 82962 GLUCOSE BLOOD TEST: CPT

## 2021-10-03 PROCEDURE — 25010000002 CEFEPIME PER 500 MG: Performed by: INTERNAL MEDICINE

## 2021-10-03 PROCEDURE — 63710000001 INSULIN DETEMIR PER 5 UNITS: Performed by: INTERNAL MEDICINE

## 2021-10-03 PROCEDURE — 83735 ASSAY OF MAGNESIUM: CPT | Performed by: INTERNAL MEDICINE

## 2021-10-03 PROCEDURE — 25010000002 ENOXAPARIN PER 10 MG: Performed by: INTERNAL MEDICINE

## 2021-10-03 PROCEDURE — 85027 COMPLETE CBC AUTOMATED: CPT | Performed by: INTERNAL MEDICINE

## 2021-10-03 PROCEDURE — 02HV33Z INSERTION OF INFUSION DEVICE INTO SUPERIOR VENA CAVA, PERCUTANEOUS APPROACH: ICD-10-PCS | Performed by: HOSPITALIST

## 2021-10-03 RX ORDER — POLYETHYLENE GLYCOL 3350 17 G/17G
17 POWDER, FOR SOLUTION ORAL DAILY
Status: DISCONTINUED | OUTPATIENT
Start: 2021-10-03 | End: 2021-10-05 | Stop reason: HOSPADM

## 2021-10-03 RX ORDER — ALBUTEROL SULFATE 90 UG/1
2 AEROSOL, METERED RESPIRATORY (INHALATION)
Status: DISCONTINUED | OUTPATIENT
Start: 2021-10-03 | End: 2021-10-05 | Stop reason: HOSPADM

## 2021-10-03 RX ORDER — PROMETHAZINE HYDROCHLORIDE 12.5 MG/1
12.5 TABLET ORAL ONCE
Status: COMPLETED | OUTPATIENT
Start: 2021-10-03 | End: 2021-10-03

## 2021-10-03 RX ORDER — DEXAMETHASONE SODIUM PHOSPHATE 4 MG/ML
6 INJECTION, SOLUTION INTRA-ARTICULAR; INTRALESIONAL; INTRAMUSCULAR; INTRAVENOUS; SOFT TISSUE DAILY
Status: DISCONTINUED | OUTPATIENT
Start: 2021-10-03 | End: 2021-10-05 | Stop reason: HOSPADM

## 2021-10-03 RX ORDER — ALPRAZOLAM 0.25 MG/1
0.25 TABLET ORAL ONCE
Status: DISCONTINUED | OUTPATIENT
Start: 2021-10-03 | End: 2021-10-05 | Stop reason: HOSPADM

## 2021-10-03 RX ORDER — LEVOFLOXACIN 750 MG/1
750 TABLET ORAL ONCE
Status: COMPLETED | OUTPATIENT
Start: 2021-10-03 | End: 2021-10-03

## 2021-10-03 RX ORDER — GUAIFENESIN AND CODEINE PHOSPHATE 100; 10 MG/5ML; MG/5ML
5 SOLUTION ORAL EVERY 4 HOURS PRN
Status: DISCONTINUED | OUTPATIENT
Start: 2021-10-03 | End: 2021-10-05 | Stop reason: HOSPADM

## 2021-10-03 RX ORDER — BUDESONIDE AND FORMOTEROL FUMARATE DIHYDRATE 160; 4.5 UG/1; UG/1
2 AEROSOL RESPIRATORY (INHALATION)
Status: DISCONTINUED | OUTPATIENT
Start: 2021-10-03 | End: 2021-10-05 | Stop reason: HOSPADM

## 2021-10-03 RX ADMIN — MORPHINE SULFATE 2 MG: 2 INJECTION, SOLUTION INTRAMUSCULAR; INTRAVENOUS at 12:31

## 2021-10-03 RX ADMIN — ALBUTEROL SULFATE 2 PUFF: 90 AEROSOL, METERED RESPIRATORY (INHALATION) at 16:21

## 2021-10-03 RX ADMIN — SERTRALINE 50 MG: 50 TABLET, FILM COATED ORAL at 08:21

## 2021-10-03 RX ADMIN — HYDROCODONE BITARTRATE AND ACETAMINOPHEN 1 TABLET: 5; 325 TABLET ORAL at 16:16

## 2021-10-03 RX ADMIN — LEVOFLOXACIN 750 MG: 5 INJECTION, SOLUTION INTRAVENOUS at 21:21

## 2021-10-03 RX ADMIN — FAMOTIDINE 20 MG: 20 TABLET ORAL at 08:21

## 2021-10-03 RX ADMIN — BUDESONIDE AND FORMOTEROL FUMARATE DIHYDRATE 2 PUFF: 160; 4.5 AEROSOL RESPIRATORY (INHALATION) at 19:47

## 2021-10-03 RX ADMIN — HYDROCODONE BITARTRATE AND ACETAMINOPHEN 1 TABLET: 5; 325 TABLET ORAL at 22:26

## 2021-10-03 RX ADMIN — INSULIN ASPART 8 UNITS: 100 INJECTION, SOLUTION INTRAVENOUS; SUBCUTANEOUS at 08:20

## 2021-10-03 RX ADMIN — IPRATROPIUM BROMIDE 2 PUFF: 17 AEROSOL, METERED RESPIRATORY (INHALATION) at 16:21

## 2021-10-03 RX ADMIN — ACETAMINOPHEN 650 MG: 325 TABLET, FILM COATED ORAL at 20:06

## 2021-10-03 RX ADMIN — HYDROCODONE BITARTRATE AND ACETAMINOPHEN 1 TABLET: 5; 325 TABLET ORAL at 08:20

## 2021-10-03 RX ADMIN — GUAIFENESIN AND CODEINE PHOSPHATE 5 ML: 100; 10 SOLUTION ORAL at 05:08

## 2021-10-03 RX ADMIN — ALBUTEROL SULFATE 2 PUFF: 90 AEROSOL, METERED RESPIRATORY (INHALATION) at 11:01

## 2021-10-03 RX ADMIN — REMDESIVIR 200 MG: 100 INJECTION, POWDER, LYOPHILIZED, FOR SOLUTION INTRAVENOUS at 12:14

## 2021-10-03 RX ADMIN — INSULIN DETEMIR 10 UNITS: 100 INJECTION, SOLUTION SUBCUTANEOUS at 08:23

## 2021-10-03 RX ADMIN — LEVOFLOXACIN 750 MG: 750 TABLET, FILM COATED ORAL at 05:57

## 2021-10-03 RX ADMIN — FAMOTIDINE 20 MG: 20 TABLET ORAL at 16:16

## 2021-10-03 RX ADMIN — INSULIN ASPART 8 UNITS: 100 INJECTION, SOLUTION INTRAVENOUS; SUBCUTANEOUS at 21:47

## 2021-10-03 RX ADMIN — INSULIN DETEMIR 20 UNITS: 100 INJECTION, SOLUTION SUBCUTANEOUS at 21:53

## 2021-10-03 RX ADMIN — SODIUM CHLORIDE, PRESERVATIVE FREE 10 ML: 5 INJECTION INTRAVENOUS at 21:49

## 2021-10-03 RX ADMIN — CEFEPIME HYDROCHLORIDE 2 G: 2 INJECTION, POWDER, FOR SOLUTION INTRAVENOUS at 21:21

## 2021-10-03 RX ADMIN — ACETAMINOPHEN 650 MG: 325 TABLET, FILM COATED ORAL at 08:31

## 2021-10-03 RX ADMIN — CEFEPIME HYDROCHLORIDE 2 G: 2 INJECTION, POWDER, FOR SOLUTION INTRAVENOUS at 11:45

## 2021-10-03 RX ADMIN — ACETAMINOPHEN 650 MG: 325 TABLET, FILM COATED ORAL at 01:36

## 2021-10-03 RX ADMIN — ENOXAPARIN SODIUM 40 MG: 40 INJECTION SUBCUTANEOUS at 08:20

## 2021-10-03 RX ADMIN — INSULIN ASPART 12 UNITS: 100 INJECTION, SOLUTION INTRAVENOUS; SUBCUTANEOUS at 17:23

## 2021-10-03 RX ADMIN — CETIRIZINE HYDROCHLORIDE 10 MG: 10 TABLET, FILM COATED ORAL at 08:21

## 2021-10-03 RX ADMIN — POTASSIUM CHLORIDE AND SODIUM CHLORIDE 125 ML/HR: 900; 150 INJECTION, SOLUTION INTRAVENOUS at 12:17

## 2021-10-03 RX ADMIN — POLYETHYLENE GLYCOL 3350 17 G: 17 POWDER, FOR SOLUTION ORAL at 18:02

## 2021-10-03 RX ADMIN — IPRATROPIUM BROMIDE 2 PUFF: 17 AEROSOL, METERED RESPIRATORY (INHALATION) at 11:01

## 2021-10-03 RX ADMIN — IPRATROPIUM BROMIDE 2 PUFF: 17 AEROSOL, METERED RESPIRATORY (INHALATION) at 19:47

## 2021-10-03 RX ADMIN — INSULIN ASPART 12 UNITS: 100 INJECTION, SOLUTION INTRAVENOUS; SUBCUTANEOUS at 12:14

## 2021-10-03 RX ADMIN — INSULIN ASPART 10 UNITS: 100 INJECTION, SOLUTION INTRAVENOUS; SUBCUTANEOUS at 17:23

## 2021-10-03 RX ADMIN — ALBUTEROL SULFATE 2 PUFF: 90 AEROSOL, METERED RESPIRATORY (INHALATION) at 19:47

## 2021-10-03 RX ADMIN — BUDESONIDE AND FORMOTEROL FUMARATE DIHYDRATE 2 PUFF: 160; 4.5 AEROSOL RESPIRATORY (INHALATION) at 11:01

## 2021-10-03 RX ADMIN — PROMETHAZINE HYDROCHLORIDE 12.5 MG: 12.5 TABLET ORAL at 08:21

## 2021-10-03 RX ADMIN — POTASSIUM CHLORIDE AND SODIUM CHLORIDE 125 ML/HR: 900; 150 INJECTION, SOLUTION INTRAVENOUS at 21:01

## 2021-10-03 RX ADMIN — DEXAMETHASONE SODIUM PHOSPHATE 6 MG: 4 INJECTION, SOLUTION INTRAMUSCULAR; INTRAVENOUS at 12:15

## 2021-10-03 RX ADMIN — INSULIN ASPART 10 UNITS: 100 INJECTION, SOLUTION INTRAVENOUS; SUBCUTANEOUS at 12:15

## 2021-10-03 NOTE — PROGRESS NOTES
[facility] Medicine Services  INPATIENT PROGRESS NOTE    Length of Stay: 1  Date of Admission: 10/2/2021  Primary Care Physician: Beverley Dunne APRN    Subjective   Chief Complaint: fever, flank pain  HPI:  31 year old female with a history of DMII and obesity who presented with fever, chills, cough, and right sided flank/chest pain.  She was COVID-19 positive and UA was consistent with UTI.  CT of the abdomen revealed findings consistent with severe pyelonephritis.  She remains febrile.  She developed hypoxia and chest x-ray is consistent with COVID-19 pneumonia.     Review of Systems   Constitutional: Positive for chills and fever.   Respiratory: Positive for cough. Negative for shortness of breath.    Cardiovascular: Negative for chest pain.   Gastrointestinal: Positive for nausea. Negative for abdominal pain and vomiting.   Genitourinary: Positive for dysuria and flank pain.        All pertinent negatives and positives are as above. All other systems have been reviewed and are negative unless otherwise stated.     Objective    Temp:  [97.3 °F (36.3 °C)-103.7 °F (39.8 °C)] 98.6 °F (37 °C)  Heart Rate:  [106-139] 107  Resp:  [16-19] 18  BP: (111-147)/(66-82) 132/67    Physical Exam  Vitals reviewed.   Constitutional:       General: She is not in acute distress.     Appearance: She is well-developed. She is ill-appearing, toxic-appearing and diaphoretic.   HENT:      Head: Normocephalic and atraumatic.   Eyes:      Conjunctiva/sclera: Conjunctivae normal.   Cardiovascular:      Rate and Rhythm: Regular rhythm. Tachycardia present.   Pulmonary:      Effort: Pulmonary effort is normal. No respiratory distress.      Breath sounds: Normal breath sounds.   Abdominal:      General: Bowel sounds are normal. There is no distension.      Palpations: Abdomen is soft.      Tenderness: There is abdominal tenderness (right sided).   Musculoskeletal:         General: Tenderness  (right flank and CVA) present. No swelling or deformity.   Skin:     General: Skin is warm.   Neurological:      Mental Status: She is alert and oriented to person, place, and time.             Results Review:  I have reviewed the labs, radiology results, and diagnostic studies.    Laboratory Data:   Results from last 7 days   Lab Units 10/03/21  1121 10/02/21  0509 10/02/21  0200   SODIUM mmol/L 129* 129* 125*   POTASSIUM mmol/L 3.6 3.5 3.7   CHLORIDE mmol/L 97* 92* 88*   CO2 mmol/L 21.0* 21.0* 21.0*   BUN mg/dL 13 11 12   CREATININE mg/dL 0.89 1.01* 1.01*   GLUCOSE mg/dL 221* 355* 440*   CALCIUM mg/dL 8.7 8.9 9.6   BILIRUBIN mg/dL 0.7  --   --    ALK PHOS U/L 122*  --   --    ALT (SGPT) U/L 17  --   --    AST (SGOT) U/L 15  --   --    ANION GAP mmol/L 11.0 16.0* 16.0*     Estimated Creatinine Clearance: 108.8 mL/min (by C-G formula based on SCr of 0.89 mg/dL).  Results from last 7 days   Lab Units 10/03/21  1121   MAGNESIUM mg/dL 1.7         Results from last 7 days   Lab Units 10/03/21  1121 10/02/21  0200   WBC 10*3/mm3 8.21 19.92*   HEMOGLOBIN g/dL 10.5* 14.4   HEMATOCRIT % 30.2* 40.5   PLATELETS 10*3/mm3 106* 152           Culture Data:   Blood Culture   Date Value Ref Range Status   10/02/2021 No growth at 24 hours  Preliminary   10/02/2021 Abnormal Stain (C)  Preliminary     Urine Culture   Date Value Ref Range Status   10/02/2021 50,000 CFU/mL Escherichia coli (A)  Preliminary   10/02/2021 25,000 CFU/mL Mixed Kayla Isolated  Preliminary     No results found for: RESPCX  No results found for: WOUNDCX  No results found for: STOOLCX  No components found for: BODYFLD    Radiology Data:   Imaging Results (Last 24 Hours)     Procedure Component Value Units Date/Time    US Guided Vascular Access [[id number]] Collected: 10/03/21 1100     Updated: 10/03/21 1237    Narrative:      PROCEDURE: Ultrasound Guidance Vascular Access      Ordering physician(s): ОЛЬГА ROSS    Clinical Indication:  Intravenous access.      Findings:    The right basilic vein was sonographically evaluated and  determined to be patent. Concurrent realtime ultrasound was used  to visualize needle entry into the right basilic vein and a  permanent image  was stored for permanent recording and  reporting.       Impression:      Impression:     Ultrasound guidance utilized for intravenous access as above.    [id number]    Electronically signed by:  Adam Jean MD  10/3/2021 12:36 PM  CDT Workstation: [id number]    IR Insert Midline Without Port Pump 5 Plus [[id number]] Resulted: 10/03/21 1131     Updated: 10/03/21 1131    Narrative:      This procedure was auto-finalized with no dictation required.    XR Chest 1 View [[id number]] Collected: 10/03/21 0438     Updated: 10/03/21 0551    Narrative:        CHEST X-RAY 1 VIEW on 10/3/2021     CLINICAL INDICATION: Sepsis, Covid 19, hypoxemia    COMPARISON: 10/2/2021    FINDINGS: There is mild elevation of the right hemidiaphragm.  There are mild developing bilateral interstitial opacities which  based upon the patient's history is suggestive of mild developing  Covid 19 pneumonia. Cardiac, hilar and mediastinal contours are  within normal limits. No bony abnormality is noted.      Impression:      Mild developing bilateral interstitial opacities  suggesting developing Covid 19 pneumonia.    Electronically signed by:  Kurt Cole  10/3/2021 5:50 AM  CDT Workstation: [id number]          I have reviewed the patient's current medications.     Assessment/Plan     Active Hospital Problems    Diagnosis    • **Sepsis due to urinary tract infection (HCC)    • Acute pyelonephritis    • E coli bacteremia    • COVID-19 virus infection    • Obesity (BMI 30-39.9)    • Type 2 diabetes mellitus without complication, without long-term current use of insulin (HCC)        Plan:    Cefepime, Levaquin  Follow cultures  IV fluid:  ml/hr  Tylenol for fever  Reports she was diagnosed with COVID initially approximately 2 weeks ago.   Remdesivir not indicated due to duration of illness.  Decadron day 1  Albuterol MDI, Atrovent MDI, Symbicort  Incentive spirometry, OPEP  Glucose control: Levemir 20 units BID, Novolog 10 units TIDWM, SSI 0-24 units  VTE PPx: lovenox    I confirmed that the patient's Advance Care Plan is present, code status is documented, or surrogate decision maker is listed in the patient's medical record.     The patient was evaluated during the global COVID-19 pandemic, and the diagnosis was suspected/considered upon their initial presentation.  Evaluation, treatment, and testing were consistent with current guidelines for patients who present with complaints or symptoms that may be related to COVID-19.          This document has been electronically signed by ALDAIR Wilkes on October 3, 2021 12:52 CDT

## 2021-10-03 NOTE — PLAN OF CARE
Goal Outcome Evaluation:  Plan of Care Reviewed With: patient        Progress: no change  Outcome Summary: Pt maintaining stable vs. Pain controlled with prn pain meds. BGL being worked towards normal range, communicated with dr at beginning of shift of bgl. Insulin ordered and bgl being monitored. Pt put on 3l of nasal cannula, 02 sat at 97% now. Chest xray ordered per dr. Will continue to monitor.

## 2021-10-03 NOTE — NURSING NOTE
Pt's midline is now in place, IVF were restarted. While putting the patient back on the IV fluids it was noted that the patient had not received the initial dose of cefepime that was ordered to begin last night. Pharmacy notified. Pt now receiving initial dose of cefepime and subsequent doses are being re-timed.

## 2021-10-03 NOTE — PLAN OF CARE
Goal Outcome Evaluation:  Plan of Care Reviewed With: patient        Progress: no change  Outcome Summary: Pt febrile this morning, PRN medication administered and effective; v/s have otherwise been stable; currently on RA and tolerating well; c/o pain, PRN medication administered and effective; will continue to monitor

## 2021-10-03 NOTE — PROGRESS NOTES
TWO PATIENT IDENTIFIERS WERE USED. THE PATIENT WAS DRAPED WITH A FULL BODY DRAPE AND THE PATIENT'S RIGHT ARM WAS PREPPED WITH CHLORA PREP. ULTRASOUND WAS USED TO LOCALIZE THELEFT BASILIC VEIN. SUBCUTANEOUS TISSUE AT THE CATHETER SITE WAS INFILTRATED WITH 2% LIDOCAINE. UNDER ULTRASOUND GUIDANCE, THE VEIN WAS ACCESSED WITH A 21 GAUGE  NEEDLE. AN 0.018 WIRE WAS THEN THREADED THROUGH THE NEEDLE. THE 21 GAUGE NEEDLE WAS REMOVED AND A 4 [place]an SHEATH WAS PLACED OVER THE WIRE INTO THE VEIN.THE MIDLINE CATHETER WAS TRIMMED TO 20CM. THE MIDLINE CATHETER WAS THEN PLACED OVER THE WIRE INTO THE VEIN, THE SHEATH WAS PEELED AWAY, WIRE WAS REMOVED. CATHETER WAS FLUSHED WITH NORMAL SALINE AND CATHETER TIP APPLIED. BIOPATCH PLACED. CATHETER SECURED WITH STAT LOCK AND TEGADERM. PATIENT TOLERATED PROCEDURE WELL. THIS WAS DONE AT BEDSIDE      IMPRESSION:SUCCESSFUL PLACEMENT OF DUAL LUMEN MIDLINE.           Ingrid Jaime  10/3/2021  11:30 CDT

## 2021-10-04 LAB
ALBUMIN SERPL-MCNC: 3.1 G/DL (ref 3.5–5.2)
ALBUMIN/GLOB SERPL: 0.9 G/DL
ALP SERPL-CCNC: 139 U/L (ref 39–117)
ALT SERPL W P-5'-P-CCNC: 24 U/L (ref 1–33)
ANION GAP SERPL CALCULATED.3IONS-SCNC: 9 MMOL/L (ref 5–15)
AST SERPL-CCNC: 27 U/L (ref 1–32)
BACTERIA SPEC AEROBE CULT: ABNORMAL
BASOPHILS # BLD AUTO: 0.04 10*3/MM3 (ref 0–0.2)
BASOPHILS NFR BLD AUTO: 0.5 % (ref 0–1.5)
BILIRUB CONJ SERPL-MCNC: 0.4 MG/DL (ref 0–0.3)
BILIRUB SERPL-MCNC: 0.6 MG/DL (ref 0–1.2)
BUN SERPL-MCNC: 17 MG/DL (ref 6–20)
BUN/CREAT SERPL: 27 (ref 7–25)
CALCIUM SPEC-SCNC: 8.9 MG/DL (ref 8.6–10.5)
CHLORIDE SERPL-SCNC: 102 MMOL/L (ref 98–107)
CO2 SERPL-SCNC: 19 MMOL/L (ref 22–29)
CREAT SERPL-MCNC: 0.63 MG/DL (ref 0.57–1)
DEPRECATED RDW RBC AUTO: 41 FL (ref 37–54)
EOSINOPHIL # BLD AUTO: 0 10*3/MM3 (ref 0–0.4)
EOSINOPHIL NFR BLD AUTO: 0 % (ref 0.3–6.2)
ERYTHROCYTE [DISTWIDTH] IN BLOOD BY AUTOMATED COUNT: 13.2 % (ref 12.3–15.4)
GFR SERPL CREATININE-BSD FRML MDRD: 111 ML/MIN/1.73
GLOBULIN UR ELPH-MCNC: 3.4 GM/DL
GLUCOSE BLDC GLUCOMTR-MCNC: 268 MG/DL (ref 70–130)
GLUCOSE BLDC GLUCOMTR-MCNC: 280 MG/DL (ref 70–130)
GLUCOSE BLDC GLUCOMTR-MCNC: 308 MG/DL (ref 70–130)
GLUCOSE BLDC GLUCOMTR-MCNC: 309 MG/DL (ref 70–130)
GLUCOSE BLDC GLUCOMTR-MCNC: 329 MG/DL (ref 70–130)
GLUCOSE BLDC GLUCOMTR-MCNC: 355 MG/DL (ref 70–130)
GLUCOSE BLDC GLUCOMTR-MCNC: 363 MG/DL (ref 70–130)
GLUCOSE BLDC GLUCOMTR-MCNC: 365 MG/DL (ref 70–130)
GLUCOSE BLDC GLUCOMTR-MCNC: 369 MG/DL (ref 70–130)
GLUCOSE BLDC GLUCOMTR-MCNC: 379 MG/DL (ref 70–130)
GLUCOSE BLDC GLUCOMTR-MCNC: 409 MG/DL (ref 70–130)
GLUCOSE SERPL-MCNC: 277 MG/DL (ref 65–99)
GRAM STN SPEC: ABNORMAL
GRAM STN SPEC: ABNORMAL
HCT VFR BLD AUTO: 30.3 % (ref 34–46.6)
HGB BLD-MCNC: 10.5 G/DL (ref 12–15.9)
IMM GRANULOCYTES # BLD AUTO: 0.07 10*3/MM3 (ref 0–0.05)
IMM GRANULOCYTES NFR BLD AUTO: 0.8 % (ref 0–0.5)
ISOLATED FROM: ABNORMAL
LYMPHOCYTES # BLD AUTO: 0.69 10*3/MM3 (ref 0.7–3.1)
LYMPHOCYTES NFR BLD AUTO: 7.8 % (ref 19.6–45.3)
MCH RBC QN AUTO: 29.5 PG (ref 26.6–33)
MCHC RBC AUTO-ENTMCNC: 34.7 G/DL (ref 31.5–35.7)
MCV RBC AUTO: 85.1 FL (ref 79–97)
MONOCYTES # BLD AUTO: 0.6 10*3/MM3 (ref 0.1–0.9)
MONOCYTES NFR BLD AUTO: 6.8 % (ref 5–12)
NEUTROPHILS NFR BLD AUTO: 7.44 10*3/MM3 (ref 1.7–7)
NEUTROPHILS NFR BLD AUTO: 84.1 % (ref 42.7–76)
NRBC BLD AUTO-RTO: 0 /100 WBC (ref 0–0.2)
PLATELET # BLD AUTO: 122 10*3/MM3 (ref 140–450)
PMV BLD AUTO: 10.7 FL (ref 6–12)
POTASSIUM SERPL-SCNC: 4.5 MMOL/L (ref 3.5–5.2)
PROT SERPL-MCNC: 6.5 G/DL (ref 6–8.5)
RBC # BLD AUTO: 3.56 10*6/MM3 (ref 3.77–5.28)
SODIUM SERPL-SCNC: 130 MMOL/L (ref 136–145)
WBC # BLD AUTO: 8.84 10*3/MM3 (ref 3.4–10.8)

## 2021-10-04 PROCEDURE — 25010000002 DEXAMETHASONE PER 1 MG: Performed by: NURSE PRACTITIONER

## 2021-10-04 PROCEDURE — 94799 UNLISTED PULMONARY SVC/PX: CPT

## 2021-10-04 PROCEDURE — 63710000001 INSULIN ASPART PER 5 UNITS: Performed by: NURSE PRACTITIONER

## 2021-10-04 PROCEDURE — 25010000002 SODIUM CHLORIDE 0.9 % WITH KCL 20 MEQ 20-0.9 MEQ/L-% SOLUTION: Performed by: INTERNAL MEDICINE

## 2021-10-04 PROCEDURE — 25010000002 CEFTRIAXONE PER 250 MG: Performed by: NURSE PRACTITIONER

## 2021-10-04 PROCEDURE — 63710000001 INSULIN ASPART PER 5 UNITS: Performed by: INTERNAL MEDICINE

## 2021-10-04 PROCEDURE — 63710000001 INSULIN DETEMIR PER 5 UNITS: Performed by: NURSE PRACTITIONER

## 2021-10-04 PROCEDURE — 25010000002 SODIUM CHLORIDE 0.9 % WITH KCL 20 MEQ 20-0.9 MEQ/L-% SOLUTION: Performed by: NURSE PRACTITIONER

## 2021-10-04 PROCEDURE — 82962 GLUCOSE BLOOD TEST: CPT

## 2021-10-04 PROCEDURE — 25010000002 ENOXAPARIN PER 10 MG: Performed by: INTERNAL MEDICINE

## 2021-10-04 PROCEDURE — 80053 COMPREHEN METABOLIC PANEL: CPT | Performed by: NURSE PRACTITIONER

## 2021-10-04 PROCEDURE — 82248 BILIRUBIN DIRECT: CPT | Performed by: NURSE PRACTITIONER

## 2021-10-04 PROCEDURE — 25010000002 CEFEPIME PER 500 MG: Performed by: INTERNAL MEDICINE

## 2021-10-04 PROCEDURE — 85025 COMPLETE CBC W/AUTO DIFF WBC: CPT | Performed by: NURSE PRACTITIONER

## 2021-10-04 PROCEDURE — 94760 N-INVAS EAR/PLS OXIMETRY 1: CPT

## 2021-10-04 RX ADMIN — SODIUM CHLORIDE, PRESERVATIVE FREE 10 ML: 5 INJECTION INTRAVENOUS at 21:10

## 2021-10-04 RX ADMIN — INSULIN ASPART 15 UNITS: 100 INJECTION, SOLUTION INTRAVENOUS; SUBCUTANEOUS at 17:15

## 2021-10-04 RX ADMIN — IPRATROPIUM BROMIDE 2 PUFF: 17 AEROSOL, METERED RESPIRATORY (INHALATION) at 04:03

## 2021-10-04 RX ADMIN — INSULIN DETEMIR 25 UNITS: 100 INJECTION, SOLUTION SUBCUTANEOUS at 21:10

## 2021-10-04 RX ADMIN — POTASSIUM CHLORIDE AND SODIUM CHLORIDE 75 ML/HR: 900; 150 INJECTION, SOLUTION INTRAVENOUS at 14:10

## 2021-10-04 RX ADMIN — FAMOTIDINE 20 MG: 20 TABLET ORAL at 08:17

## 2021-10-04 RX ADMIN — GUAIFENESIN AND CODEINE PHOSPHATE 5 ML: 100; 10 SOLUTION ORAL at 17:22

## 2021-10-04 RX ADMIN — CEFEPIME HYDROCHLORIDE 2 G: 2 INJECTION, POWDER, FOR SOLUTION INTRAVENOUS at 05:00

## 2021-10-04 RX ADMIN — POTASSIUM CHLORIDE AND SODIUM CHLORIDE 125 ML/HR: 900; 150 INJECTION, SOLUTION INTRAVENOUS at 05:15

## 2021-10-04 RX ADMIN — FAMOTIDINE 20 MG: 20 TABLET ORAL at 17:12

## 2021-10-04 RX ADMIN — ALBUTEROL SULFATE 2 PUFF: 90 AEROSOL, METERED RESPIRATORY (INHALATION) at 08:27

## 2021-10-04 RX ADMIN — INSULIN ASPART 10 UNITS: 100 INJECTION, SOLUTION INTRAVENOUS; SUBCUTANEOUS at 08:18

## 2021-10-04 RX ADMIN — ALBUTEROL SULFATE 2 PUFF: 90 AEROSOL, METERED RESPIRATORY (INHALATION) at 15:42

## 2021-10-04 RX ADMIN — INSULIN DETEMIR 20 UNITS: 100 INJECTION, SOLUTION SUBCUTANEOUS at 08:19

## 2021-10-04 RX ADMIN — CETIRIZINE HYDROCHLORIDE 10 MG: 10 TABLET, FILM COATED ORAL at 08:17

## 2021-10-04 RX ADMIN — BUDESONIDE AND FORMOTEROL FUMARATE DIHYDRATE 2 PUFF: 160; 4.5 AEROSOL RESPIRATORY (INHALATION) at 08:27

## 2021-10-04 RX ADMIN — POLYETHYLENE GLYCOL 3350 17 G: 17 POWDER, FOR SOLUTION ORAL at 08:16

## 2021-10-04 RX ADMIN — INSULIN ASPART 16 UNITS: 100 INJECTION, SOLUTION INTRAVENOUS; SUBCUTANEOUS at 17:12

## 2021-10-04 RX ADMIN — SERTRALINE 50 MG: 50 TABLET, FILM COATED ORAL at 08:17

## 2021-10-04 RX ADMIN — IPRATROPIUM BROMIDE 2 PUFF: 17 AEROSOL, METERED RESPIRATORY (INHALATION) at 19:13

## 2021-10-04 RX ADMIN — INSULIN ASPART 12 UNITS: 100 INJECTION, SOLUTION INTRAVENOUS; SUBCUTANEOUS at 08:17

## 2021-10-04 RX ADMIN — ALBUTEROL SULFATE 2 PUFF: 90 AEROSOL, METERED RESPIRATORY (INHALATION) at 11:44

## 2021-10-04 RX ADMIN — DEXAMETHASONE SODIUM PHOSPHATE 6 MG: 4 INJECTION, SOLUTION INTRAMUSCULAR; INTRAVENOUS at 08:17

## 2021-10-04 RX ADMIN — ALBUTEROL SULFATE 2 PUFF: 90 AEROSOL, METERED RESPIRATORY (INHALATION) at 19:13

## 2021-10-04 RX ADMIN — IPRATROPIUM BROMIDE 2 PUFF: 17 AEROSOL, METERED RESPIRATORY (INHALATION) at 15:42

## 2021-10-04 RX ADMIN — SODIUM CHLORIDE, PRESERVATIVE FREE 10 ML: 5 INJECTION INTRAVENOUS at 08:18

## 2021-10-04 RX ADMIN — INSULIN ASPART 10 UNITS: 100 INJECTION, SOLUTION INTRAVENOUS; SUBCUTANEOUS at 00:41

## 2021-10-04 RX ADMIN — GUAIFENESIN AND CODEINE PHOSPHATE 5 ML: 100; 10 SOLUTION ORAL at 08:26

## 2021-10-04 RX ADMIN — IPRATROPIUM BROMIDE 2 PUFF: 17 AEROSOL, METERED RESPIRATORY (INHALATION) at 11:44

## 2021-10-04 RX ADMIN — INSULIN ASPART 20 UNITS: 100 INJECTION, SOLUTION INTRAVENOUS; SUBCUTANEOUS at 11:56

## 2021-10-04 RX ADMIN — HYDROCODONE BITARTRATE AND ACETAMINOPHEN 1 TABLET: 5; 325 TABLET ORAL at 17:12

## 2021-10-04 RX ADMIN — ALBUTEROL SULFATE 2 PUFF: 90 AEROSOL, METERED RESPIRATORY (INHALATION) at 04:04

## 2021-10-04 RX ADMIN — BUDESONIDE AND FORMOTEROL FUMARATE DIHYDRATE 2 PUFF: 160; 4.5 AEROSOL RESPIRATORY (INHALATION) at 19:13

## 2021-10-04 RX ADMIN — INSULIN ASPART 10 UNITS: 100 INJECTION, SOLUTION INTRAVENOUS; SUBCUTANEOUS at 11:56

## 2021-10-04 RX ADMIN — BUDESONIDE AND FORMOTEROL FUMARATE DIHYDRATE 2 PUFF: 160; 4.5 AEROSOL RESPIRATORY (INHALATION) at 04:03

## 2021-10-04 RX ADMIN — IPRATROPIUM BROMIDE 2 PUFF: 17 AEROSOL, METERED RESPIRATORY (INHALATION) at 08:27

## 2021-10-04 RX ADMIN — CEFTRIAXONE SODIUM 2 G: 2 INJECTION, POWDER, FOR SOLUTION INTRAMUSCULAR; INTRAVENOUS at 10:02

## 2021-10-04 RX ADMIN — ENOXAPARIN SODIUM 40 MG: 40 INJECTION SUBCUTANEOUS at 08:17

## 2021-10-04 RX ADMIN — HYDROCODONE BITARTRATE AND ACETAMINOPHEN 1 TABLET: 5; 325 TABLET ORAL at 10:06

## 2021-10-04 NOTE — PLAN OF CARE
Goal Outcome Evaluation:  Plan of Care Reviewed With: patient        Progress: improving  Outcome Summary: Pt maintained stable VS. Beginning of shift worked on maintaining bgl and communicated with hospitalist. 8 units novolog given near beginning of shift and 10 units of novolog given around 0100. PRN med tylenol given for pain and prevention of fever spike. PRN med norco given for pain control. Antibiotics given via iv infusion. Pt has no further complaints. Will continue to monitor.

## 2021-10-04 NOTE — PROGRESS NOTES
[facility] Medicine Services  INPATIENT PROGRESS NOTE    Length of Stay: 2  Date of Admission: 10/2/2021  Primary Care Physician: Beverley Dunne APRN    Subjective   Chief Complaint: fever, flank pain  HPI:  31 year old female with a history of DMII and obesity who presented with fever, chills, cough, and right sided flank/chest pain.  She was COVID-19 positive and UA was consistent with UTI.  CT of the abdomen revealed findings consistent with severe pyelonephritis.  Blood cultures grew E. Coli.  She is afebrile, last fever noted 0819 on 10/3.      Review of Systems   Constitutional: Negative for chills and fever.   Respiratory: Positive for cough. Negative for shortness of breath.    Cardiovascular: Negative for chest pain.   Gastrointestinal: Negative for abdominal pain, nausea and vomiting.   Genitourinary: Positive for flank pain.        All pertinent negatives and positives are as above. All other systems have been reviewed and are negative unless otherwise stated.     Objective    Temp:  [97.5 °F (36.4 °C)-99.4 °F (37.4 °C)] 98.1 °F (36.7 °C)  Heart Rate:  [] 86  Resp:  [16-18] 18  BP: (116-146)/(67-92) 146/92    Physical Exam  Vitals reviewed.   Constitutional:       General: She is not in acute distress.     Appearance: She is well-developed. She is ill-appearing.   HENT:      Head: Normocephalic and atraumatic.   Eyes:      Conjunctiva/sclera: Conjunctivae normal.   Cardiovascular:      Rate and Rhythm: Normal rate and regular rhythm.   Pulmonary:      Effort: Pulmonary effort is normal. No respiratory distress.      Breath sounds: Normal breath sounds.   Abdominal:      General: Bowel sounds are normal. There is no distension.      Palpations: Abdomen is soft.   Musculoskeletal:         General: No swelling or deformity.   Skin:     General: Skin is warm and dry.   Neurological:      General: No focal deficit present.      Mental Status: She is alert  and oriented to person, place, and time.             Results Review:  I have reviewed the labs, radiology results, and diagnostic studies.    Laboratory Data:   Results from last 7 days   Lab Units 10/04/21  0535 10/03/21  1121 10/02/21  0509   SODIUM mmol/L 130* 129* 129*   POTASSIUM mmol/L 4.5 3.6 3.5   CHLORIDE mmol/L 102 97* 92*   CO2 mmol/L 19.0* 21.0* 21.0*   BUN mg/dL 17 13 11   CREATININE mg/dL 0.63 0.89 1.01*   GLUCOSE mg/dL 277* 221* 355*   CALCIUM mg/dL 8.9 8.7 8.9   BILIRUBIN mg/dL 0.6 0.7  --    ALK PHOS U/L 139* 122*  --    ALT (SGPT) U/L 24 17  --    AST (SGOT) U/L 27 15  --    ANION GAP mmol/L 9.0 11.0 16.0*     Estimated Creatinine Clearance: 150.5 mL/min (by C-G formula based on SCr of 0.63 mg/dL).  Results from last 7 days   Lab Units 10/03/21  1121   MAGNESIUM mg/dL 1.7         Results from last 7 days   Lab Units 10/04/21  0535 10/03/21  1121 10/02/21  0200   WBC 10*3/mm3 8.84 8.21 19.92*   HEMOGLOBIN g/dL 10.5* 10.5* 14.4   HEMATOCRIT % 30.3* 30.2* 40.5   PLATELETS 10*3/mm3 122* 106* 152           Culture Data:   Blood Culture   Date Value Ref Range Status   10/02/2021 No growth at 2 days  Preliminary   10/02/2021 Escherichia coli (C)  Final     Urine Culture   Date Value Ref Range Status   10/02/2021 50,000 CFU/mL Escherichia coli (A)  Final   10/02/2021 25,000 CFU/mL Mixed Kayla Isolated  Final     No results found for: RESPCX  No results found for: WOUNDCX  No results found for: STOOLCX  No components found for: BODYFLD    Radiology Data:   Imaging Results (Last 24 Hours)     ** No results found for the last 24 hours. **          I have reviewed the patient's current medications.     Assessment/Plan     Active Hospital Problems    Diagnosis    • **Sepsis due to urinary tract infection (HCC)    • Acute pyelonephritis    • E coli bacteremia    • COVID-19 virus infection    • Obesity (BMI 30-39.9)    • Type 2 diabetes mellitus without complication, without long-term current use of insulin (HCC)         Plan:    De-escalate antibiotics to Rocephin monotherapy  If afebrile for 48 hours can transition to PO antibiotics  IV fluid: NS with 20 KCl @ 75 ml/hr  Reports she was diagnosed with COVID initially approximately 2 weeks ago.  Remdesivir not indicated due to duration of illness.  Decadron day 2  Weaned to room air  Albuterol MDI, Atrovent MDI, Symbicort  Incentive spirometry, OPEP  Glucose control: exacerbated due to steroids, Levemir 25 units BID, Novolog 15 units TIDWM, SSI 0-24 units  VTE PPx: lovenox    I confirmed that the patient's Advance Care Plan is present, code status is documented, or surrogate decision maker is listed in the patient's medical record.     The patient was evaluated during the global COVID-19 pandemic, and the diagnosis was suspected/considered upon their initial presentation.  Evaluation, treatment, and testing were consistent with current guidelines for patients who present with complaints or symptoms that may be related to COVID-19.          This document has been electronically signed by ALDAIR Wilkes on October 4, 2021 13:07 CDT

## 2021-10-04 NOTE — PAYOR COMM NOTE
"Elizabeth Shettyisra   [facility]  phone [phone]  fax [phone]    Mallorie Gonzalez (31 y.o. Female)     Date of Birth Social Security Number Address Home Phone MRN    1990  [address] [place] [address] [phone] [id number]    [facility] Status          None        Admission Date Admission Type Admitting Provider Attending Provider Department, Room/Bed    10/2/21 Emergency Behroozi, Saeid, MD Williams, Kevin L, MD [address], 420/1    Discharge Date Discharge Disposition Discharge Destination                       Attending Provider: Jorge Hinojosa MD    Allergies: No Known Allergies    Isolation: Enh Drop/Con   Infection: COVID (confirmed) (10/02/21)   Code Status: CPR    Ht: 160 cm (63\")   Wt: 104 kg (230 lb 3.2 oz)    Admission Cmt: None   Principal Problem: Sepsis due to urinary tract infection (HCC) [A41.9,N39.0]                 Active Insurance as of 10/2/2021     Primary Coverage     Payor Plan Insurance Group Employer/Plan Group    [employer] [facility] [employer] [facility]      Payor Plan Address Payor Plan Phone Number Payor Plan Fax Number Effective Dates    [address] [address]   6/1/2015 - None Entered    [place] [address]       Subscriber Name Subscriber Birth Date Member ID       MALLORIE GONZALEZ 1990 [id number]                 Emergency Contacts      (Rel.) Home Phone Work Phone Mobile Phone    Meeta Malik (Spouse) [phone] -- [phone]               History & Physical      Behroozi, Saeid, MD at 10/02/21 0622                [facility] Medicine Admission      Date of Admission: 10/2/2021      Primary Care Physician: Beverley Dunne APRN      Chief Complaint: Feeling sick    HPI:  Patient is 31-year-old morbidly obese female with BMI of 40 with known past medical history anxiety and panic attack, diabetes mellitus " type 2, her home medication include insulin but patient reports she does not take any insulin and use only Metformin, asthma presented to the ER with complaint of fever chills rhinorrhea productive cough whitish sputum body ache insomnia shortness of breath diarrhea and right-sided chest pain per ED record.  Patient was tachycardic in ED and had elevated blood sugars.  She received fluid bolus.  Patient was diagnosed with Covid and sepsis and potential urinary tract infection.  Hospitalist service was called for admission of the patient.  Patient was seen and examined in ED room 5.  Patient reports that for the last 2 weeks she had initially shortness of breath fever chills productive and nonproductive cough loss of smell and initially diarrhea.  She was diagnosed with Covid 2 days ago.  She presented to the ER concerning above complaints.  She denies any fall injury trauma headache sore throat chest pain UTI-like symptoms.  Have diarrhea resolved with time.  During encounter was noted that patient has right flank pain.  She denies any right-sided chest pain.  Patient had decreased p.o. intake.  Patient takes Metformin for diabetes.  Patient is ill-looking.  Patient is on room air.      Concurrent Medical History:  has a past medical history of Anxiety, Asthma, Diabetes mellitus (HCC), and Gestational diabetes.    Past Surgical History:  has a past surgical history that includes Ankle surgery (Left, 2010) and [place] tooth extraction.    Family History: family history includes Cancer in her mother; Diabetes in her maternal grandmother; Heart disease in her mother.     Social History:  reports that she has been smoking cigarettes. She has been smoking about 1.00 pack per day. She has never used smokeless tobacco. She reports that she does not drink alcohol and does not use drugs.    Allergies: No Known Allergies    Medications:   Prior to Admission medications    Medication Sig Start Date End Date Taking? Authorizing  "Provider   albuterol sulfate HFA (Ventolin HFA) 108 (90 Base) MCG/ACT inhaler Inhale 2 puffs Every 4 (Four) Hours As Needed for Wheezing. 7/21/20   Bebe Gutierrez APRN   Alcohol Swabs (ALCOHOL WIPES) 70 % pads Use 4 x daily 7/30/20   Jude Meeks MD B-D ULTRA-FINE 33 LANCETS misc Use 4 x daily , any brand covered by insurance 7/30/20   Jude Meeks MD   Blood Glucose Monitoring Suppl w/Device kit USE AS INDICATED, ANY MONITOR , ICD10 code is E11.9 7/30/20   Jude Meeks MD   Ertugliflozin L-PyroglutamicAc (Steglatro) 5 MG tablet Take 1 tablet by mouth Every Morning. 7/30/20   Jude Meeks MD   Glucose Blood (BLOOD GLUCOSE TEST) strip Use 4 x daily use any brand covered by insurance or same brand as before ICD10 code is E11.9 7/30/20   Jude Meeks MD   Insulin Glargine (BASAGLAR KWIKPEN) 100 UNIT/ML injection pen 15 units at night 7/30/20   Jude Meeks MD   insulin lispro (Admelog) 100 UNIT/ML injection Inject 100 Units under the skin into the appropriate area as directed Daily. 9/3/20   Jude Meeks MD   Insulin Pen Needle (PEN NEEDLES) 32G X 4 MM misc 1 each 4 (Four) Times a Day. Use 4 x daily, Dx code E11.65 7/30/20   Jude Meeks MD   Insulin Syringe 31G X 5/16\" 0.5 ML misc Use 4 x daily 10/28/19   Jude Meeks MD   Insulin Syringes, Disposable, U-100 0.5 ML misc 1 each 3 (Three) Times a Day. 9/3/20   Jude Meeks MD   Lancet Devices (LANCING DEVICE) misc USE AS INDICATED TO CORRELATE WITH STRIPS AND METER 7/30/20   Jude Meeks MD   loratadine (CLARITIN) 10 MG tablet Take 1 tablet by mouth Daily. 7/21/20   Bebe Gutierrez APRN   medroxyPROGESTERone (DEPO-PROVERA) 150 MG/ML injection  1/16/20   Provider, MD Maulik   propranolol (INDERAL) 20 MG tablet  1/22/20   Provider, MD Maulik   pseudoephedrine (SUDAFED) 30 MG tablet Take 1 tablet by mouth 2 " (Two) Times a Day. 6/6/21   Loretta Parry, APRN   Semaglutide, 1 MG/DOSE, (Ozempic, 1 MG/DOSE,) 2 MG/1.5ML solution pen-injector Inject 1 mg under the skin into the appropriate area as directed 1 (One) Time Per Week. 4/23/21   Jude Meeks MD   sertraline (ZOLOFT) 100 MG tablet  12/19/19   Emergency, Nurse Klaudia, RN   sertraline (ZOLOFT) 50 MG tablet Take 1 tablet by mouth Daily. 3/5/19   Jr Prieto MD       Review of Systems:  Review of Systems   Constitutional: Positive for activity change, appetite change, fatigue and fever.   Respiratory: Positive for cough and shortness of breath.    Gastrointestinal: Positive for abdominal pain and diarrhea. Constipation: right flank pain.   Musculoskeletal: Positive for myalgias.      Otherwise complete ROS is negative except as mentioned above.    Physical Exam:   Temp:  [100 °F (37.8 °C)-101 °F (38.3 °C)] 101 °F (38.3 °C)  Heart Rate:  [114-136] 136  Resp:  [20-22] 20  BP: (113-139)/(59-79) 136/76  Physical Exam  Constitutional:       General: She is not in acute distress.     Appearance: She is obese. She is ill-appearing. She is not toxic-appearing or diaphoretic.   HENT:      Head: Normocephalic and atraumatic.      Right Ear: External ear normal.      Left Ear: External ear normal.      Nose: Nose normal.      Mouth/Throat:      Mouth: Mucous membranes are dry.      Pharynx: Oropharynx is clear.   Eyes:      Extraocular Movements: Extraocular movements intact.      Conjunctiva/sclera: Conjunctivae normal.      Pupils: Pupils are equal, round, and reactive to light.   Cardiovascular:      Rate and Rhythm: Normal rate and regular rhythm.      Heart sounds: No murmur heard.   No friction rub. No gallop.    Pulmonary:      Effort: No respiratory distress.      Breath sounds: No stridor. No wheezing or rales.   Chest:      Chest wall: No tenderness.   Abdominal:      General: Abdomen is flat. There is no distension.      Palpations: Abdomen is soft.       Tenderness: There is no abdominal tenderness. There is no guarding or rebound.      Comments: Right flank pain, tenderness on palpitation   Musculoskeletal:         General: No swelling or tenderness.      Cervical back: No rigidity or tenderness.      Right lower leg: No edema.      Left lower leg: No edema.   Lymphadenopathy:      Cervical: No cervical adenopathy.   Skin:     General: Skin is warm and dry.      Coloration: Skin is not jaundiced.      Findings: No erythema.   Neurological:      Mental Status: She is alert and oriented to person, place, and time. Mental status is at baseline.      Sensory: No sensory deficit.      Motor: No weakness.      Coordination: Coordination normal.   Psychiatric:         Mood and Affect: Mood normal.         Behavior: Behavior normal.         Judgment: Judgment normal.           Results Reviewed:  I have personally reviewed current lab, radiology, and data and agree with results.  Lab Results (last 24 hours)     Procedure Component Value Units Date/Time    Basic Metabolic Panel [[id number]]  (Abnormal) Collected: 10/02/21 0509    Specimen: Blood Updated: 10/02/21 0545     Glucose 355 mg/dL      BUN 11 mg/dL      Creatinine 1.01 mg/dL      Sodium 129 mmol/L      Potassium 3.5 mmol/L      Chloride 92 mmol/L      CO2 21.0 mmol/L      Calcium 8.9 mg/dL      eGFR Non African Amer 64 mL/min/1.73      BUN/Creatinine Ratio 10.9     Anion Gap 16.0 mmol/L     Narrative:      GFR Normal >60  Chronic Kidney Disease <60  Kidney Failure <15      Urinalysis, Microscopic Only - Urine, Clean Catch [[id number]]  (Abnormal) Collected: 10/02/21 0359    Specimen: Urine, Clean Catch Updated: 10/02/21 0453     RBC, UA 3-5 /HPF      WBC, UA 0-2 /HPF      Bacteria, UA Trace /HPF      Squamous Epithelial Cells, UA 0-2 /HPF      Yeast, UA Small/1+ Yeast /HPF      Hyaline Casts, UA None Seen /LPF      Methodology Manual Light Microscopy    Urinalysis With Microscopic If Indicated (No Culture) - Urine,  Clean Catch [[id number]]  (Abnormal) Collected: 10/02/21 0359    Specimen: Urine, Clean Catch Updated: 10/02/21 0452     Color, UA Yellow     Appearance, UA Cloudy     pH, UA 5.5     Specific Gravity, UA 1.033     Glucose, UA >=1000 mg/dL (3+)     Ketones, UA 80 mg/dL (3+)     Bilirubin, UA Negative     Blood, UA Moderate (2+)     Protein,  mg/dL (2+)     Leuk Esterase, UA Small (1+)     Nitrite, UA Positive     Urobilinogen, UA 1.0 E.U./dL    Blood Culture - Blood, Arm, Left [[id number]] Collected: 10/02/21 0424    Specimen: Blood from Arm, Left Updated: 10/02/21 0427    Troponin [[id number]]  (Normal) Collected: 10/02/21 0312    Specimen: Blood from Arm, Right Updated: 10/02/21 0351     Troponin T <0.010 ng/mL     Narrative:      Troponin T Reference Range:  <= 0.03 ng/mL-   Negative for AMI  >0.03 ng/mL-     Abnormal for myocardial necrosis.  Clinicians would have to utilize clinical acumen, EKG, Troponin and serial changes to determine if it is an Acute Myocardial Infarction or myocardial injury due to an underlying chronic condition.       Results may be falsely decreased if patient taking Biotin.      Lactic Acid, Plasma [[id number]]  (Normal) Collected: 10/02/21 0312    Specimen: Blood from Arm, Right Updated: 10/02/21 0349     Lactate 1.6 mmol/L     Blood Culture - Blood, Arm, Right [[id number]] Collected: 10/02/21 0312    Specimen: Blood from Arm, Right Updated: 10/02/21 0326    Procalcitonin [[id number]]  (Abnormal) Collected: 10/02/21 0200    Specimen: Blood Updated: 10/02/21 0258     Procalcitonin 11.82 ng/mL     Narrative:      As a Marker for Sepsis (Non-Neonates):     1. <0.5 ng/mL represents a low risk of severe sepsis and/or septic shock.  2. >2 ng/mL represents a high risk of severe sepsis and/or septic shock.    As a Marker for Lower Respiratory Tract Infections that require antibiotic therapy:  PCT on Admission     Antibiotic Therapy             6-12 Hrs later  >0.5                           "Strongly Recommended            >0.25 - <0.5             Recommended  0.1 - 0.25                  Discouraged                       Remeasure/reassess PCT  <0.1                         Strongly Discouraged         Remeasure/reassess PCT      As 28 day mortality risk marker: \"Change in Procalcitonin Result\" (>80% or <=80%) if Day 0 (or Day 1) and Day 4 values are available. Refer to http://www.AppChinapct-calculator.com/    Change in PCT <=80 %   A decrease of PCT levels below or equal to 80% defines a positive change in PCT test result representing a higher risk for 28-day all-cause mortality of patients diagnosed with severe sepsis or septic shock.    Change in PCT >80 %   A decrease of PCT levels of more than 80% defines a negative change in PCT result representing a lower risk for 28-day all-cause mortality of patients diagnosed with severe sepsis or septic shock.              Results may be falsely decreased if patient taking Biotin.     COVID-19 and FLU A/B PCR - Swab, Nasopharynx [[id number]]  (Abnormal) Collected: 10/02/21 0219    Specimen: Swab from Nasopharynx Updated: 10/02/21 0248     COVID19 Detected     Influenza A PCR Not Detected     Influenza B PCR Not Detected    Narrative:      Fact sheet for providers: https://www.fda.gov/media/880463/download    Fact sheet for patients: https://www.fda.gov/media/073541/download    Test performed by PCR.  Influenza A and Influenza B negative results should be considered presumptive in samples that have a positive SARS-CoV-2 result.    Competitive inhibition studies showed that SARS-CoV-2 virus, when present at concentrations above 3.6E+04 copies/mL, can inhibit the detection and amplification of influenza A and influenza B virus RNA if present at or below 1.8E+02 copies/mL or 4.9E+02 copies/mL, respectively, and may lead to false negative influenza virus results. If co-infection with influenza A or influenza B virus is suspected in samples with a positive SARS-CoV-2 " result, the sample should be re-tested with another FDA cleared, approved, or authorized influenza test, if influenza virus detection would change clinical management.    Basic Metabolic Panel [[id number]]  (Abnormal) Collected: 10/02/21 0200    Specimen: Blood Updated: 10/02/21 0238     Glucose 440 mg/dL      BUN 12 mg/dL      Creatinine 1.01 mg/dL      Sodium 125 mmol/L      Potassium 3.7 mmol/L      Comment: Slight hemolysis detected by analyzer. Results may be affected.        Chloride 88 mmol/L      CO2 21.0 mmol/L      Calcium 9.6 mg/dL      eGFR Non African Amer 64 mL/min/1.73      BUN/Creatinine Ratio 11.9     Anion Gap 16.0 mmol/L     Narrative:      GFR Normal >60  Chronic Kidney Disease <60  Kidney Failure <15      Troponin [[id number]]  (Normal) Collected: 10/02/21 0200    Specimen: Blood Updated: 10/02/21 0228     Troponin T <0.010 ng/mL     Narrative:      Troponin T Reference Range:  <= 0.03 ng/mL-   Negative for AMI  >0.03 ng/mL-     Abnormal for myocardial necrosis.  Clinicians would have to utilize clinical acumen, EKG, Troponin and serial changes to determine if it is an Acute Myocardial Infarction or myocardial injury due to an underlying chronic condition.       Results may be falsely decreased if patient taking Biotin.      BNP [[id number]]  (Normal) Collected: 10/02/21 0200    Specimen: Blood Updated: 10/02/21 0226     proBNP 139.6 pg/mL     Narrative:      Among patients with dyspnea, NT-proBNP is highly sensitive for the detection of acute congestive heart failure. In addition NT-proBNP of <300 pg/ml effectively rules out acute congestive heart failure with 99% negative predictive value.    Results may be falsely decreased if patient taking Biotin.      D-dimer, Quantitative [[id number]]  (Abnormal) Collected: 10/02/21 0200    Specimen: Blood Updated: 10/02/21 0222     D-Dimer, Quantitative 2,470 ng/mL (FEU)     Narrative:      Dimer values <500 ng/ml FEU are FDA approved as aid in diagnosis of  deep venous thrombosis and pulmonary embolism.  This test should not be used in an exclusion strategy with pretest probability alone.    A recent guideline regarding diagnosis for pulmonary thromboembolism recommends an adjusted exclusion criterion of age x 10 ng/ml FEU for patients >50 years of age ([employer]a Intern Med 2015; 163: 701-711).      hCG, Serum, Qualitative [[id number]]  (Normal) Collected: 10/02/21 0200    Specimen: Blood Updated: 10/02/21 0217     HCG Qualitative Negative    CBC & Differential [[id number]]  (Abnormal) Collected: 10/02/21 0200    Specimen: Blood Updated: 10/02/21 0203    Narrative:      The following orders were created for panel order CBC & Differential.  Procedure                               Abnormality         Status                     ---------                               -----------         ------                     CBC Auto Differential[[id number]]        Abnormal            Final result                 Please view results for these tests on the individual orders.    CBC Auto Differential [[id number]]  (Abnormal) Collected: 10/02/21 0200    Specimen: Blood Updated: 10/02/21 0203     WBC 19.92 10*3/mm3      RBC 4.78 10*6/mm3      Hemoglobin 14.4 g/dL      Hematocrit 40.5 %      MCV 84.7 fL      MCH 30.1 pg      MCHC 35.6 g/dL      RDW 12.4 %      RDW-SD 38.2 fl      MPV 10.4 fL      Platelets 152 10*3/mm3      Neutrophil % 89.7 %      Lymphocyte % 2.5 %      Monocyte % 5.2 %      Eosinophil % 0.1 %      Basophil % 0.4 %      Immature Grans % 2.1 %      Neutrophils, Absolute 17.88 10*3/mm3      Lymphocytes, Absolute 0.50 10*3/mm3      Monocytes, Absolute 1.03 10*3/mm3      Eosinophils, Absolute 0.02 10*3/mm3      Basophils, Absolute 0.07 10*3/mm3      Immature Grans, Absolute 0.42 10*3/mm3      nRBC 0.0 /100 WBC         Imaging Results (Last 24 Hours)     Procedure Component Value Units Date/Time    CT Angiogram Chest [[id number]] Collected: 10/02/21 0320     Updated: 10/02/21 0401     Narrative:      EXAM:    CT Angiography Chest With Intravenous Contrast    CLINICAL HISTORY:    The patient is 31 years old and is Female; soa    TECHNIQUE:    Axial computed tomographic angiography images of the chest with  intravenous contrast.  Sagittal and coronal reformatted images  were created and reviewed.  This CT exam was performed using one  or more of the following dose reduction techniques:  automated  exposure control, adjustment of the mA and/or kV according to  patient size, and/or use of iterative reconstruction technique.    MIP reconstructed images were created and reviewed.    COMPARISON:    No relevant prior studies available.    FINDINGS:    PULMONARY ARTERIES:  There are no obvious filling defects  identified within the pulmonary arteries to suggest pulmonary  embolism.    AORTA:  No acute findings.  No thoracic aortic aneurysm.    LUNGS:  Area of linear atelectasis in the lingula and right  middle lobe are present. The lungs are otherwise clear. The  tracheobronchial tree is widely patent.  No mass.    PLEURAL SPACE:  Unremarkable.  No significant effusion.  No  pneumothorax.    HEART:  Unremarkable.  No cardiomegaly.  No significant  pericardial effusion.  No evidence of RV dysfunction.    BONES/JOINTS:  No acute fracture.  No dislocation.    SOFT TISSUES:  Unremarkable.    LYMPH NODES:  Unremarkable.  No enlarged lymph nodes.    LIVER:  The liver is enlarged and diffusely fatty.      Impression:      1.  No evidence of pulmonary embolism.  2.  Findings suggest areas of atelectasis within the right middle  lobe and lingula.    Electronically signed by:  Shanon Bethea MD  10/2/2021 4:00 AM  CDT Workstation: [id number]    XR Chest 1 View [[id number]] Collected: 10/02/21 0153     Updated: 10/02/21 0213    Narrative:      EXAM:    XR Chest, 1 View    CLINICAL HISTORY:    The patient is 31 years old and is Female; soa    TECHNIQUE:    Frontal view of the chest.    COMPARISON:    No relevant prior  studies available.    FINDINGS:    LUNGS:  Unremarkable.  No consolidation.    PLEURAL SPACE:  Unremarkable.  No pneumothorax.    HEART:  Unremarkable.  No cardiomegaly.    MEDIASTINUM:  Unremarkable.    BONES/JOINTS:  Unremarkable.    UPPER ABDOMEN:  Unremarkable as visualized.      Impression:        No acute cardiopulmonary process.    Electronically signed by:  Shanon Bethea MD  10/2/2021 2:11 AM  CDT Workstation: [id number]            Assessment:    Active Hospital Problems    Diagnosis    • COVID-19 virus infection      # Sepsis, present on admission, concern for sepsis considering significantly elevated procalcitonin level  # Possible pneumonia, community acquired pneumonia cannot be excluded however CTA only suggestive of atelectatic changes  # Right flank pain unclear etiology  # UTI, concerning right pyelonephritis  # Hyponatremia, pseudohyponatremia  # Dehydration   # Uncontrolled diabetes mellitus type 2 with hyperglycemia  # Leukocytosis reactive  # Anxiety and panic attack   # Morbid obesity with BMI of 40   # Significantly elevated procalcitonin level suggestive of sepsis  # COVID-19 infection initial symptoms, 2 weeks old, CT of the chest not suggestive of Covid like pneumonia, patient on room air  # Elevated D-dimer level in setting of infection, sepsis potential bacterial infection          Plan:  Admit to inpatient medical service  Placed on telemetry  Blood cultures were obtained in ED  Add urine culture to current UA.  Informed laboratory department in this regard  Obtain stat CT of the abdomen and pelvis considering right flank pain.  Placed on broad-spectrum IV antibiotic Zosyn, Levaquin, vancomycin concerning sepsis, potential severe sepsis, potential pneumonia versus urinary tract infection as source of pneumonia  Give fluid bolus  Placed on high rate IV fluid  Placed on insulin sliding scale  Comorbidities will be treated appropriately  Further decision-making depending on response to  "above care.  Please see orders for comprehensive plan.    Addendum: 7:33 AM  Severe right-sided pyelonephritis  I discussed with radiology service.  CT of the abdomen and pelvis suggestive of severe right-sided pyelonephritis without hydronephrosis.  Official CT of the abdomen and pelvis report pending.  Defer to medical care provider in a.m. any need urology service consultation upon availability of results of official report of CT of the abdomen and pelvis.    I confirmed that the patient's Advance Care Plan is present, code status is documented, or surrogate decision maker is listed in the patient's medical record.     I have utilized all available immediate resources to obtain, update, or review the patient's current medications.     I discussed the patient's findings and my recommendations with: She agreed with above plan of care.      Saeid Behroozi, MD   10/02/21   06:22 CDT                Electronically signed by Behroozi, Saeid, MD at 10/02/21 0806          Emergency Department Notes      Adam Pelletier, RN at 10/02/21 0219        Pt given glass of water per pt request     Adam Pelletier RN  10/02/21 0219      Electronically signed by Adam Pelletier RN at 10/02/21 0219     William Anguiano MD at 10/02/21 0300      Procedure Orders    1. ECG 12 Lead [[id number]] ordered by William Anguiano MD               Subjective   30yo female pmh significant dm2/obesity presents ED c/o 4d hx subjective fever/chills/rhinorrhea/productive cough \"white\" sputum/myalgias/anosmia/dysguesia/soa.  ROS (+) diarrhea, right sided chest pain.  Denies abdominal pain/vomiting/dysuria.  Pt reports recent covid-19 (+) test at urgent care 2d previously.      History provided by:  Patient  URI  Presenting symptoms: congestion, cough, fatigue, fever and rhinorrhea    Severity:  Moderate  Onset quality:  Gradual  Duration:  4 days      Review of Systems   Constitutional: Positive for fatigue and fever.   HENT: Positive for congestion and " rhinorrhea.    Eyes: Negative for redness.   Respiratory: Positive for cough and shortness of breath.    Cardiovascular: Positive for chest pain.   Gastrointestinal: Positive for diarrhea. Negative for abdominal pain, nausea and vomiting.   Genitourinary: Negative.    Musculoskeletal: Negative.    Allergic/Immunologic: Negative for immunocompromised state.   All other systems reviewed and are negative.      Past Medical History:   Diagnosis Date   • Anxiety    • Asthma    • Diabetes mellitus (HCC)     Type 2   • Gestational diabetes     controlled with Insulin        No Known Allergies    Past Surgical History:   Procedure Laterality Date   • ANKLE SURGERY Left 2010   • [place]IS[place] TOOTH EXTRACTION         Family History   Problem Relation Age of Onset   • Cancer Mother    • Heart disease Mother    • Diabetes Maternal Grandmother        Social History     Socioeconomic History   • Marital status:      Spouse name: Not on file   • Number of children: Not on file   • Years of education: Not on file   • Highest education level: Not on file   Tobacco Use   • Smoking status: Current Every Day Smoker     Packs/day: 1.00     Types: Cigarettes   • Smokeless tobacco: Never Used   • Tobacco comment: 1*800*quit*now   Substance and Sexual Activity   • Alcohol use: No   • Drug use: No   • Sexual activity: Yes     Partners: Male     Birth control/protection: None           Objective   Physical Exam  Vitals and nursing note reviewed.   Constitutional:       Appearance: Normal appearance.   HENT:      Head: Normocephalic and atraumatic.      Nose: Nose normal.      Mouth/Throat:      Mouth: Mucous membranes are moist.   Cardiovascular:      Rate and Rhythm: Regular rhythm. Tachycardia present.      Pulses: Normal pulses.      Heart sounds: Normal heart sounds. No murmur heard.   No friction rub. No gallop.    Pulmonary:      Effort: Pulmonary effort is normal. No respiratory distress.      Breath sounds: Normal breath sounds. No  wheezing, rhonchi or rales.   Abdominal:      General: Bowel sounds are normal. There is no distension.      Palpations: Abdomen is soft.      Tenderness: There is no abdominal tenderness. There is no guarding or rebound.   Musculoskeletal:         General: No swelling or deformity.      Cervical back: Normal range of motion and neck supple. No rigidity.   Lymphadenopathy:      Cervical: No cervical adenopathy.   Skin:     General: Skin is warm and dry.   Neurological:      General: No focal deficit present.      Mental Status: She is alert and oriented to person, place, and time.      GCS: GCS eye subscore is 4. GCS verbal subscore is 5. GCS motor subscore is 6.         ECG 12 Lead      Date/Time: 10/2/2021 3:03 AM  Performed by: William Anguiano MD  Authorized by: William Anguiano MD   Interpreted by physician  Rhythm: sinus tachycardia  Rate: tachycardic  BPM: 133  QRS axis: normal  Conduction: conduction normal  ST Segments: ST segments normal  T Waves: T waves normal  Other: no other findings  Clinical impression: normal ECG                ED Course      Labs Reviewed   COVID-19 AND FLU A/B, NP SWAB IN TRANSPORT MEDIA 8-12 HR TAT - Abnormal; Notable for the following components:       Result Value    COVID19 Detected (*)     All other components within normal limits    Narrative:     Fact sheet for providers: https://www.fda.gov/media/592021/download    Fact sheet for patients: https://www.fda.gov/media/170342/download    Test performed by PCR.  Influenza A and Influenza B negative results should be considered presumptive in samples that have a positive SARS-CoV-2 result.    Competitive inhibition studies showed that SARS-CoV-2 virus, when present at concentrations above 3.6E+04 copies/mL, can inhibit the detection and amplification of influenza A and influenza B virus RNA if present at or below 1.8E+02 copies/mL or 4.9E+02 copies/mL, respectively, and may lead to false negative influenza virus results. If  "co-infection with influenza A or influenza B virus is suspected in samples with a positive SARS-CoV-2 result, the sample should be re-tested with another FDA cleared, approved, or authorized influenza test, if influenza virus detection would change clinical management.   BASIC METABOLIC PANEL - Abnormal; Notable for the following components:    Glucose 440 (*)     Creatinine 1.01 (*)     Sodium 125 (*)     Chloride 88 (*)     CO2 21.0 (*)     Anion Gap 16.0 (*)     All other components within normal limits    Narrative:     GFR Normal >60  Chronic Kidney Disease <60  Kidney Failure <15     URINALYSIS W/ MICROSCOPIC IF INDICATED (NO CULTURE) - Abnormal; Notable for the following components:    Appearance, UA Cloudy (*)     Specific Gravity, UA 1.033 (*)     Glucose, UA >=1000 mg/dL (3+) (*)     Ketones, UA 80 mg/dL (3+) (*)     Blood, UA Moderate (2+) (*)     Protein,  mg/dL (2+) (*)     Leuk Esterase, UA Small (1+) (*)     Nitrite, UA Positive (*)     All other components within normal limits   PROCALCITONIN - Abnormal; Notable for the following components:    Procalcitonin 11.82 (*)     All other components within normal limits    Narrative:     As a Marker for Sepsis (Non-Neonates):     1. <0.5 ng/mL represents a low risk of severe sepsis and/or septic shock.  2. >2 ng/mL represents a high risk of severe sepsis and/or septic shock.    As a Marker for Lower Respiratory Tract Infections that require antibiotic therapy:  PCT on Admission     Antibiotic Therapy             6-12 Hrs later  >0.5                          Strongly Recommended            >0.25 - <0.5             Recommended  0.1 - 0.25                  Discouraged                       Remeasure/reassess PCT  <0.1                         Strongly Discouraged         Remeasure/reassess PCT      As 28 day mortality risk marker: \"Change in Procalcitonin Result\" (>80% or <=80%) if Day 0 (or Day 1) and Day 4 values are available. Refer to " http://www.[email or web address]-pct-calculator.com/    Change in PCT <=80 %   A decrease of PCT levels below or equal to 80% defines a positive change in PCT test result representing a higher risk for 28-day all-cause mortality of patients diagnosed with severe sepsis or septic shock.    Change in PCT >80 %   A decrease of PCT levels of more than 80% defines a negative change in PCT result representing a lower risk for 28-day all-cause mortality of patients diagnosed with severe sepsis or septic shock.              Results may be falsely decreased if patient taking Biotin.    D-DIMER, QUANTITATIVE - Abnormal; Notable for the following components:    D-Dimer, Quantitative 2,470 (*)     All other components within normal limits    Narrative:     Dimer values <500 ng/ml FEU are FDA approved as aid in diagnosis of deep venous thrombosis and pulmonary embolism.  This test should not be used in an exclusion strategy with pretest probability alone.    A recent guideline regarding diagnosis for pulmonary thromboembolism recommends an adjusted exclusion criterion of age x 10 ng/ml FEU for patients >50 years of age ([employer] Intern Med 2015; 163: 701-711).     CBC WITH AUTO DIFFERENTIAL - Abnormal; Notable for the following components:    WBC 19.92 (*)     Neutrophil % 89.7 (*)     Lymphocyte % 2.5 (*)     Eosinophil % 0.1 (*)     Immature Grans % 2.1 (*)     Neutrophils, Absolute 17.88 (*)     Lymphocytes, Absolute 0.50 (*)     Monocytes, Absolute 1.03 (*)     Immature Grans, Absolute 0.42 (*)     All other components within normal limits   URINALYSIS, MICROSCOPIC ONLY - Abnormal; Notable for the following components:    RBC, UA 3-5 (*)     Bacteria, UA Trace (*)     All other components within normal limits   BASIC METABOLIC PANEL - Abnormal; Notable for the following components:    Glucose 355 (*)     Creatinine 1.01 (*)     Sodium 129 (*)     Chloride 92 (*)     CO2 21.0 (*)     Anion Gap 16.0 (*)     All other components within normal  limits    Narrative:     GFR Normal >60  Chronic Kidney Disease <60  Kidney Failure <15     HCG, SERUM, QUALITATIVE - Normal   TROPONIN (IN-HOUSE) - Normal    Narrative:     Troponin T Reference Range:  <= 0.03 ng/mL-   Negative for AMI  >0.03 ng/mL-     Abnormal for myocardial necrosis.  Clinicians would have to utilize clinical acumen, EKG, Troponin and serial changes to determine if it is an Acute Myocardial Infarction or myocardial injury due to an underlying chronic condition.       Results may be falsely decreased if patient taking Biotin.     TROPONIN (IN-HOUSE) - Normal    Narrative:     Troponin T Reference Range:  <= 0.03 ng/mL-   Negative for AMI  >0.03 ng/mL-     Abnormal for myocardial necrosis.  Clinicians would have to utilize clinical acumen, EKG, Troponin and serial changes to determine if it is an Acute Myocardial Infarction or myocardial injury due to an underlying chronic condition.       Results may be falsely decreased if patient taking Biotin.     BNP (IN-HOUSE) - Normal    Narrative:     Among patients with dyspnea, NT-proBNP is highly sensitive for the detection of acute congestive heart failure. In addition NT-proBNP of <300 pg/ml effectively rules out acute congestive heart failure with 99% negative predictive value.    Results may be falsely decreased if patient taking Biotin.     LACTIC ACID, PLASMA - Normal   BLOOD CULTURE   BLOOD CULTURE   CBC AND DIFFERENTIAL    Narrative:     The following orders were created for panel order CBC & Differential.  Procedure                               Abnormality         Status                     ---------                               -----------         ------                     CBC Auto Differential[[id number]]        Abnormal            Final result                 Please view results for these tests on the individual orders.     XR Chest 1 View    Result Date: 10/2/2021  Narrative: EXAM:   XR Chest, 1 View CLINICAL HISTORY:   The patient is 31  years old and is Female; soa TECHNIQUE:   Frontal view of the chest. COMPARISON:   No relevant prior studies available. FINDINGS:   LUNGS:  Unremarkable.  No consolidation.   PLEURAL SPACE:  Unremarkable.  No pneumothorax.   HEART:  Unremarkable.  No cardiomegaly.   MEDIASTINUM:  Unremarkable.   BONES/JOINTS:  Unremarkable.   UPPER ABDOMEN:  Unremarkable as visualized.     Impression:   No acute cardiopulmonary process. Electronically signed by:  Shanon Bethea MD  10/2/2021 2:11 AM CDT Workstation: [id number]    CT Angiogram Chest    Result Date: 10/2/2021  Narrative: EXAM:   CT Angiography Chest With Intravenous Contrast CLINICAL HISTORY:   The patient is 31 years old and is Female; soa TECHNIQUE:   Axial computed tomographic angiography images of the chest with intravenous contrast.  Sagittal and coronal reformatted images were created and reviewed.  This CT exam was performed using one or more of the following dose reduction techniques:  automated exposure control, adjustment of the mA and/or kV according to patient size, and/or use of iterative reconstruction technique.   MIP reconstructed images were created and reviewed. COMPARISON:   No relevant prior studies available. FINDINGS:   PULMONARY ARTERIES:  There are no obvious filling defects identified within the pulmonary arteries to suggest pulmonary embolism.   AORTA:  No acute findings.  No thoracic aortic aneurysm.   LUNGS:  Area of linear atelectasis in the lingula and right middle lobe are present. The lungs are otherwise clear. The tracheobronchial tree is widely patent.  No mass.   PLEURAL SPACE:  Unremarkable.  No significant effusion.  No pneumothorax.   HEART:  Unremarkable.  No cardiomegaly.  No significant pericardial effusion.  No evidence of RV dysfunction.   BONES/JOINTS:  No acute fracture.  No dislocation.   SOFT TISSUES:  Unremarkable.   LYMPH NODES:  Unremarkable.  No enlarged lymph nodes.   LIVER:  The liver is enlarged and diffusely  fatty.     Impression: 1.  No evidence of pulmonary embolism. 2.  Findings suggest areas of atelectasis within the right middle lobe and lingula. Electronically signed by:  Shanon Bethea MD  10/2/2021 4:00 AM CDT Workstation: [id number]                                         [facility]    Final diagnoses:   COVID-19 virus infection   Sepsis, due to unspecified organism, unspecified whether acute organ dysfunction present (HCC)   UTI (urinary tract infection), bacterial   Type 2 diabetes mellitus with hyperglycemia, unspecified whether long term insulin use (HCC)       ED Disposition  ED Disposition     ED Disposition Condition Comment    Decision to Admit  Level of Care: Telemetry [5]   Admitting Physician: BEHROOZI, SAEID [[id number]]   Attending Physician: BEHROOZI, SAEID [[id number]]   Patient Class: Inpatient [101]            No follow-up provider specified.       Medication List      No changes were made to your prescriptions during this visit.          William Anguiano MD  10/02/21 0600      Electronically signed by William Anguiano MD at 10/02/21 0600     Adam Pelletier RN at 10/02/21 0515        BMP specimen collected and sent to lab with pt label.      Adam Pelletier RN  10/02/21 0516      Electronically signed by Adam Pelletier RN at 10/02/21 0516         Facility-Administered Medications as of 10/4/2021   Medication Dose Route Frequency Provider Last Rate Last Admin   • [COMPLETED] acetaminophen (TYLENOL) tablet 1,000 mg  1,000 mg Oral Once William Anguiano MD   1,000 mg at 10/02/21 0513   • acetaminophen (TYLENOL) tablet 650 mg  650 mg Oral Q6H PRN Behroozi, Saeid, MD   650 mg at 10/03/21 2006   • albuterol sulfate HFA (PROVENTIL HFA;VENTOLIN HFA;PROAIR HFA) inhaler 2 puff  2 puff Inhalation 4x Daily - RT Christian Man APRN   2 puff at 10/04/21 0827   • ALPRAZolam (XANAX) tablet 0.25 mg  0.25 mg Oral Once Behroozi, Saeid, MD       • [COMPLETED] AZITHROMYCIN 500 MG/250 ML 0.9% NS IVPB (vial-mate)  500  mg Intravenous Once William Anguiano MD   500 mg at 10/02/21 0446   • budesonide-formoterol (SYMBICORT) 160-4.5 MCG/ACT inhaler 2 puff  2 puff Inhalation BID - RT Christian Man APRN   2 puff at 10/04/21 0403   • [COMPLETED] cefepime (MAXIPIME) 2 g/100 mL 0.9% NS (mbp)  2 g Intravenous Once Behroozi, Saeid,  mL/hr at 10/02/21 2031 2 g at 10/02/21 2031   • [COMPLETED] cefepime (MAXIPIME) 2 g/100 mL 0.9% NS (mbp)  2 g Intravenous Once Christian Man APRN 200 mL/hr at 10/03/21 1145 2 g at 10/03/21 1145   • [COMPLETED] cefTRIAXone (ROCEPHIN) 1 g/100 mL 0.9% NS (MBP)  1 g Intravenous Once William Anguiano MD   Stopped at 10/02/21 0447   • cefTRIAXone (ROCEPHIN) 2 g/100 mL 0.9% NS IVPB (MBP)  2 g Intravenous Q24H Christian Man APRN   2 g at 10/04/21 1002   • cetirizine (zyrTEC) tablet 10 mg  10 mg Oral Daily Behroozi, Saeid, MD   10 mg at 10/04/21 0817   • dexamethasone (DECADRON) injection 6 mg  6 mg Intravenous Daily Christian Man APRN   6 mg at 10/04/21 0817   • dextrose (D50W) 25 g/ 50mL Intravenous Solution 25 g  25 g Intravenous Q15 Min PRN Behroozi, Saeid, MD       • dextrose (GLUTOSE) oral gel 15 g  15 g Oral Q15 Min PRN Behroozi, Saeid, MD       • enoxaparin (LOVENOX) syringe 40 mg  40 mg Subcutaneous Q24H Behroozi, Saeid, MD   40 mg at 10/04/21 0817   • famotidine (PEPCID) tablet 20 mg  20 mg Oral BID AC Behroozi, Saeid, MD   20 mg at 10/04/21 0817   • glucagon (human recombinant) (GLUCAGEN DIAGNOSTIC) injection 1 mg  1 mg Subcutaneous Q15 Min PRN Behroozi, Saeid, MD       • guaiFENesin-codeine (GUAIFENESIN AC) 100-10 MG/5ML liquid 5 mL  5 mL Oral Q4H PRN Behroozi, Saeid, MD   5 mL at 10/04/21 0826   • HYDROcodone-acetaminophen (NORCO) 5-325 MG per tablet 1 tablet  1 tablet Oral Q6H PRN Christian Man APRN   1 tablet at 10/04/21 1006   • ibuprofen (ADVIL,MOTRIN) tablet 400 mg  400 mg Oral Q4H PRN Christian Man APRN   400 mg at 10/02/21 1625    • insulin aspart (novoLOG) injection 0-24 Units  0-24 Units Subcutaneous TID AC Christian Man APRN   12 Units at 10/04/21 0817   • insulin aspart (novoLOG) injection 10 Units  10 Units Subcutaneous TID With Meals Christian Man APRN   10 Units at 10/04/21 0818   • [COMPLETED] insulin aspart (novoLOG) injection 10 Units  10 Units Subcutaneous Once Behroozi, Saeid, MD   10 Units at 10/04/21 0041   • [COMPLETED] insulin aspart (novoLOG) injection 12 Units  12 Units Subcutaneous Once Behroozi, Saeid, MD   12 Units at 10/02/21 2137   • [COMPLETED] insulin aspart (novoLOG) injection 6 Units  6 Units Subcutaneous Once Behroozi, Saeid, MD   6 Units at 10/02/21 2347   • [COMPLETED] insulin aspart (novoLOG) injection 8 Units  8 Units Subcutaneous Once Behroozi, Saeid, MD   8 Units at 10/03/21 2147   • insulin detemir (LEVEMIR) injection 20 Units  20 Units Subcutaneous Q12H Christian Man APRN   20 Units at 10/04/21 0819   • [COMPLETED] insulin regular (humuLIN R,novoLIN R) injection 8 Units  8 Units Subcutaneous Once William Anguiano MD   8 Units at 10/02/21 0329   • [COMPLETED] iopamidol (ISOVUE-370) 76 % injection 100 mL  100 mL Intravenous Once in imaging William Anguiano MD   56 mL at 10/02/21 0323   • ipratropium (ATROVENT HFA) inhaler 2 puff  2 puff Inhalation 4x Daily - RT Christian Man APRN   2 puff at 10/04/21 0827   • [COMPLETED] lactated ringers bolus 1,000 mL  1,000 mL Intravenous Once Christian Man APRN 4,000 mL/hr at 10/02/21 1624 1,000 mL at 10/02/21 1624   • [COMPLETED] levoFLOXacin (LEVAQUIN) tablet 750 mg  750 mg Oral Once Behroozi, Saeid, MD   750 mg at 10/03/21 0557   • morphine injection 2 mg  2 mg Intravenous Q4H PRN Christian Man APRN   2 mg at 10/03/21 1231   • ondansetron (ZOFRAN) injection 4 mg  4 mg Intravenous Q6H PRN Behroozi, Saeid, MD       • polyethylene glycol (MIRALAX) packet 17 g  17 g Oral Daily Christian Man,  APRN   17 g at 10/04/21 0816   • [COMPLETED] promethazine (PHENERGAN) tablet 12.5 mg  12.5 mg Oral Once Behroozi, Saeid, MD   12.5 mg at 10/03/21 0821   • sertraline (ZOLOFT) tablet 50 mg  50 mg Oral Daily Behroozi, Saeid, MD   50 mg at 10/04/21 0817   • [COMPLETED] sodium chloride 0.9 % bolus 1,000 mL  1,000 mL Intravenous Once William Anguiano MD 1,000 mL/hr at 10/02/21 0513 1,000 mL at 10/02/21 0513   • [COMPLETED] sodium chloride 0.9 % bolus 1,000 mL  1,000 mL Intravenous Once Behroozi, Saeid, MD 1,000 mL/hr at 10/02/21 0956 1,000 mL at 10/02/21 0956   • [COMPLETED] sodium chloride 0.9 % bolus 500 mL  500 mL Intravenous Once William Anguiano MD   Stopped at 10/02/21 0314   • [COMPLETED] sodium chloride 0.9 % bolus 500 mL  500 mL Intravenous Once William Anguiano MD   Stopped at 10/02/21 0447   • sodium chloride 0.9 % flush 10 mL  10 mL Intravenous PRN William Anguiano MD       • sodium chloride 0.9 % flush 10 mL  10 mL Intravenous Q12H Behroozi, Saeid, MD   10 mL at 10/04/21 0818   • sodium chloride 0.9 % flush 10 mL  10 mL Intravenous PRN Behroozi, Saeid, MD       • sodium chloride 0.9 % with KCl 20 mEq/L infusion  125 mL/hr Intravenous Continuous Behroozi, Saeid,  mL/hr at 10/04/21 0515 125 mL/hr at 10/04/21 0515   • [COMPLETED] vancomycin 2000 mg/500 mL 0.9% NS IVPB ([facility])  20 mg/kg Intravenous Once Behroozi, Saeid, MD   2,000 mg at 10/02/21 1141     Lab Results (last 48 hours)     Procedure Component Value Units Date/Time    POC Glucose Once [[id number]]  (Abnormal) Collected: 10/04/21 0727    Specimen: Blood Updated: 10/04/21 0741     Glucose 280 mg/dL      Comment: RN NotifiedOperator: [id number] KARL Harrell ID: [id number]       Comprehensive Metabolic Panel [[id number]]  (Abnormal) Collected: 10/04/21 0535    Specimen: Blood Updated: 10/04/21 0621     Glucose 277 mg/dL      BUN 17 mg/dL      Creatinine 0.63 mg/dL      Sodium 130 mmol/L      Potassium 4.5 mmol/L      Chloride 102 mmol/L       CO2 19.0 mmol/L      Calcium 8.9 mg/dL      Total Protein 6.5 g/dL      Albumin 3.10 g/dL      ALT (SGPT) 24 U/L      AST (SGOT) 27 U/L      Alkaline Phosphatase 139 U/L      Total Bilirubin 0.6 mg/dL      eGFR Non African Amer 111 mL/min/1.73      Globulin 3.4 gm/dL      A/G Ratio 0.9 g/dL      BUN/Creatinine Ratio 27.0     Anion Gap 9.0 mmol/L     Narrative:      GFR Normal >60  Chronic Kidney Disease <60  Kidney Failure <15      Bilirubin, Direct [[id number]]  (Abnormal) Collected: 10/04/21 0535    Specimen: Blood Updated: 10/04/21 0618     Bilirubin, Direct 0.4 mg/dL     CBC & Differential [[id number]]  (Abnormal) Collected: 10/04/21 0535    Specimen: Blood Updated: 10/04/21 0612    Narrative:      The following orders were created for panel order CBC & Differential.  Procedure                               Abnormality         Status                     ---------                               -----------         ------                     CBC Auto Differential[[id number]]        Abnormal            Final result               Scan Slide[[id number]]                                                                    Please view results for these tests on the individual orders.    CBC Auto Differential [[id number]]  (Abnormal) Collected: 10/04/21 0535    Specimen: Blood Updated: 10/04/21 0611     WBC 8.84 10*3/mm3      RBC 3.56 10*6/mm3      Hemoglobin 10.5 g/dL      Hematocrit 30.3 %      MCV 85.1 fL      MCH 29.5 pg      MCHC 34.7 g/dL      RDW 13.2 %      RDW-SD 41.0 fl      MPV 10.7 fL      Platelets 122 10*3/mm3      Neutrophil % 84.1 %      Lymphocyte % 7.8 %      Monocyte % 6.8 %      Eosinophil % 0.0 %      Basophil % 0.5 %      Immature Grans % 0.8 %      Neutrophils, Absolute 7.44 10*3/mm3      Lymphocytes, Absolute 0.69 10*3/mm3      Monocytes, Absolute 0.60 10*3/mm3      Eosinophils, Absolute 0.00 10*3/mm3      Basophils, Absolute 0.04 10*3/mm3      Immature Grans, Absolute 0.07 10*3/mm3      nRBC 0.0 /100  WBC     Blood Culture - Blood, Arm, Right [[id number]]  (Abnormal)  (Susceptibility) Collected: 10/02/21 0312    Specimen: Blood from Arm, Right Updated: 10/04/21 0530     Blood Culture Escherichia coli     Isolated from Aerobic and Anaerobic Bottles     Gram Stain Aerobic Bottle Gram negative bacilli     Comment: 1 bottle positive of 3 drawn         Anaerobic Bottle Gram negative bacilli     Comment: 2 bottles positive of 3 drawn       Susceptibility      Escherichia coli      ALBERTO      Ampicillin Resistant      Ampicillin + Sulbactam Intermediate      Cefepime Susceptible      Ceftazidime Susceptible      Ceftriaxone Susceptible      Gentamicin Susceptible      Levofloxacin Susceptible      Piperacillin + Tazobactam Susceptible      Trimethoprim + Sulfamethoxazole Resistant               Linear View               Susceptibility Comments     Escherichia coli    Cefazolin sensitivity will not be reported for Enterobacteriaceae in non-urine isolates. If cefazolin is preferred, please call the microbiology lab to request an E-test.  With the exception of urinary-sourced infections, aminoglycosides should not be used as monotherapy.             POC Glucose Once [[id number]]  (Abnormal) Collected: 10/03/21 1628    Specimen: Blood Updated: 10/04/21 0441     Glucose 268 mg/dL      Comment: RN NotifiedOperator: [id number] ARTHUR Acuna ID: [id number]       Blood Culture - Blood, Arm, Left [[id number]] Collected: 10/02/21 0424    Specimen: Blood from Arm, Left Updated: 10/04/21 0431     Blood Culture No growth at 2 days    POC Glucose Once [[id number]]  (Abnormal) Collected: 10/03/21 1940    Specimen: Blood Updated: 10/03/21 2036     Glucose 365 mg/dL      Comment: RN NotifiedOperator: [id number] VIOLETA Carlin ID: [id number]       Hemoglobin A1c [[id number]]  (Abnormal) Collected: 10/03/21 1121    Specimen: Blood Updated: 10/03/21 1248     Hemoglobin A1C 9.40 %     Narrative:      Hemoglobin A1C  Ranges:    Increased Risk for Diabetes  5.7% to 6.4%  Diabetes                     >= 6.5%  Diabetic Goal                < 7.0%    Comprehensive Metabolic Panel [[id number]]  (Abnormal) Collected: 10/03/21 1121    Specimen: Blood Updated: 10/03/21 1245     Glucose 221 mg/dL      BUN 13 mg/dL      Creatinine 0.89 mg/dL      Sodium 129 mmol/L      Potassium 3.6 mmol/L      Chloride 97 mmol/L      CO2 21.0 mmol/L      Calcium 8.7 mg/dL      Total Protein 6.5 g/dL      Albumin 3.00 g/dL      ALT (SGPT) 17 U/L      AST (SGOT) 15 U/L      Alkaline Phosphatase 122 U/L      Total Bilirubin 0.7 mg/dL      eGFR Non African Amer 74 mL/min/1.73      Globulin 3.5 gm/dL      A/G Ratio 0.9 g/dL      BUN/Creatinine Ratio 14.6     Anion Gap 11.0 mmol/L     Narrative:      GFR Normal >60  Chronic Kidney Disease <60  Kidney Failure <15      Bilirubin, Direct [[id number]]  (Abnormal) Collected: 10/03/21 1121    Specimen: Blood Updated: 10/03/21 1245     Bilirubin, Direct 0.4 mg/dL     Magnesium [[id number]]  (Normal) Collected: 10/03/21 1121    Specimen: Blood Updated: 10/03/21 1245     Magnesium 1.7 mg/dL     T4, Free [[id number]]  (Abnormal) Collected: 10/03/21 1121    Specimen: Blood Updated: 10/03/21 1206     Free T4 0.88 ng/dL     Narrative:      Results may be falsely increased if patient taking Biotin.      TSH [[id number]]  (Normal) Collected: 10/03/21 1121    Specimen: Blood Updated: 10/03/21 1206     TSH 1.770 uIU/mL     CBC (No Diff) [[id number]]  (Abnormal) Collected: 10/03/21 1121    Specimen: Blood Updated: 10/03/21 1147     WBC 8.21 10*3/mm3      RBC 3.50 10*6/mm3      Hemoglobin 10.5 g/dL      Hematocrit 30.2 %      MCV 86.3 fL      MCH 30.0 pg      MCHC 34.8 g/dL      RDW 13.0 %      RDW-SD 40.3 fl      MPV 10.4 fL      Platelets 106 10*3/mm3     POC Glucose Once [[id number]]  (Abnormal) Collected: 10/02/21 2309    Specimen: Blood Updated: 10/03/21 1146     Glucose 340 mg/dL      Comment: : [id number] RAYSHAWN  BethelMeter ID: [id number]       POC Glucose Once [[id number]]  (Abnormal) Collected: 10/02/21 2046    Specimen: Blood Updated: 10/03/21 1146     Glucose 401 mg/dL      Comment:  NOTIFIEDOperator: [id number] [facility]: [id number]       POC Glucose Once [[id number]]  (Abnormal) Collected: 10/02/21 2320    Specimen: Blood Updated: 10/03/21 1146     Glucose 331 mg/dL      Comment:  NOTIFIEDOperator: [id number] [facility] [id number]       POC Glucose Once [[id number]]  (Abnormal) Collected: 10/02/21 2319    Specimen: Blood Updated: 10/03/21 1145     Glucose 375 mg/dL      Comment:  NOTIFIEDOperator: [id number] [facility]: [id number]       POC Glucose Once [[id number]]  (Abnormal) Collected: 10/02/21 1929    Specimen: Blood Updated: 10/03/21 1145     Glucose 411 mg/dL      Comment: RN NotifiedOperator: [id number] GADIEL MIKAYLAMeter ID: [id number]       POC Glucose Once [[id number]]  (Abnormal) Collected: 10/02/21 1927    Specimen: Blood Updated: 10/03/21 1145     Glucose 434 mg/dL      Comment: : [id number] GADIEL MIKAYLAMeter ID: [id number]       POC Glucose Once [[id number]]  (Abnormal) Collected: 10/03/21 1015    Specimen: Blood Updated: 10/03/21 1046     Glucose 265 mg/dL      Comment: RN NotifiedOperator: [id number] ARTHUR Acuna ID: [id number]       Urine Culture - Urine, Urine, Clean Catch [[id number]]  (Abnormal) Collected: 10/02/21 0359    Specimen: Urine, Clean Catch Updated: 10/03/21 0936     Urine Culture 50,000 CFU/mL Escherichia coli      25,000 CFU/mL Mixed Ollie Isolated    Narrative:      Specimen contains mixed organisms of questionable pathogenicity which indicates contamination with commensal ollie.  Further identification is unlikely to provide clinically useful information.  Suggest recollection.    POC Glucose Once [[id number]]  (Abnormal) Collected: 10/03/21 0717    Specimen: Blood Updated: 10/03/21 0736     Glucose 285 mg/dL       Comment: RN NotifiedOperator: [id number] ARTHUR LANAMeter ID: [id number]       POC Glucose Once [[id number]]  (Abnormal) Collected: 10/03/21 0047    Specimen: Blood Updated: 10/03/21 0735     Glucose 360 mg/dL      Comment: RN NotifiedOperator: [id number] GADIEL ANNELAMeter ID: [id number]       Blood Culture ID, PCR - Blood, Arm, Right [[id number]]  (Abnormal) Collected: 10/02/21 0312    Specimen: Blood from Arm, Right Updated: 10/02/21 1749     BCID, PCR Eschericia coli. Identification by BCID2 PCR.    POC Glucose Once [[id number]]  (Abnormal) Collected: 10/02/21 1652    Specimen: Blood Updated: 10/02/21 1706     Glucose 308 mg/dL      Comment: RN NotifiedOperator: [id number] ARTHUR LANAMeter ID: [id number]       POC Glucose Once [[id number]]  (Abnormal) Collected: 10/02/21 1145    Specimen: Blood Updated: 10/02/21 1629     Glucose 306 mg/dL      Comment: : [id number] REYES JENNIFERMeter ID: [id number]             Imaging Results (Last 48 Hours)     Procedure Component Value Units Date/Time    US Guided Vascular Access [[id number]] Collected: 10/03/21 1100     Updated: 10/03/21 1237    Narrative:      PROCEDURE: Ultrasound Guidance Vascular Access      Ordering physician(s): ОЛЬГА ROSS    Clinical Indication:  Intravenous access.     Findings:    The right basilic vein was sonographically evaluated and  determined to be patent. Concurrent realtime ultrasound was used  to visualize needle entry into the right basilic vein and a  permanent image  was stored for permanent recording and  reporting.       Impression:      Impression:     Ultrasound guidance utilized for intravenous access as above.    [id number]    Electronically signed by:  Adam Jean MD  10/3/2021 12:36 PM  CDT Workstation: [id number]    IR Insert Midline Without Port Pump 5 Plus [[id number]] Resulted: 10/03/21 1131     Updated: 10/03/21 1131    Narrative:      This procedure was auto-finalized with no dictation required.    XR Chest 1  View [[id number]] Collected: 10/03/21 0438     Updated: 10/03/21 0551    Narrative:        CHEST X-RAY 1 VIEW on 10/3/2021     CLINICAL INDICATION: Sepsis, Covid 19, hypoxemia    COMPARISON: 10/2/2021    FINDINGS: There is mild elevation of the right hemidiaphragm.  There are mild developing bilateral interstitial opacities which  based upon the patient's history is suggestive of mild developing  Covid 19 pneumonia. Cardiac, hilar and mediastinal contours are  within normal limits. No bony abnormality is noted.      Impression:      Mild developing bilateral interstitial opacities  suggesting developing Covid 19 pneumonia.    Electronically signed by:  Kurt Cole  10/3/2021 5:50 AM  CDT Workstation: [id number]        ECG/EMG Results (last 48 hours)     ** No results found for the last 48 hours. **           Physician Progress Notes (last 48 hours) (Notes from 10/02/21 1044 through 10/04/21 1044)      Christian Man APRN at 10/03/21 1246                [facility] Medicine Services  INPATIENT PROGRESS NOTE    Length of Stay: 1  Date of Admission: 10/2/2021  Primary Care Physician: Beverley Dunne APRN    Subjective   Chief Complaint: fever, flank pain  HPI:  31 year old female with a history of DMII and obesity who presented with fever, chills, cough, and right sided flank/chest pain.  She was COVID-19 positive and UA was consistent with UTI.  CT of the abdomen revealed findings consistent with severe pyelonephritis.  She remains febrile.  She developed hypoxia and chest x-ray is consistent with COVID-19 pneumonia.     Review of Systems   Constitutional: Positive for chills and fever.   Respiratory: Positive for cough. Negative for shortness of breath.    Cardiovascular: Negative for chest pain.   Gastrointestinal: Positive for nausea. Negative for abdominal pain and vomiting.   Genitourinary: Positive for dysuria and flank pain.        All pertinent negatives and  positives are as above. All other systems have been reviewed and are negative unless otherwise stated.     Objective    Temp:  [97.3 °F (36.3 °C)-103.7 °F (39.8 °C)] 98.6 °F (37 °C)  Heart Rate:  [106-139] 107  Resp:  [16-19] 18  BP: (111-147)/(66-82) 132/67    Physical Exam  Vitals reviewed.   Constitutional:       General: She is not in acute distress.     Appearance: She is well-developed. She is ill-appearing, toxic-appearing and diaphoretic.   HENT:      Head: Normocephalic and atraumatic.   Eyes:      Conjunctiva/sclera: Conjunctivae normal.   Cardiovascular:      Rate and Rhythm: Regular rhythm. Tachycardia present.   Pulmonary:      Effort: Pulmonary effort is normal. No respiratory distress.      Breath sounds: Normal breath sounds.   Abdominal:      General: Bowel sounds are normal. There is no distension.      Palpations: Abdomen is soft.      Tenderness: There is abdominal tenderness (right sided).   Musculoskeletal:         General: Tenderness (right flank and CVA) present. No swelling or deformity.   Skin:     General: Skin is warm.   Neurological:      Mental Status: She is alert and oriented to person, place, and time.             Results Review:  I have reviewed the labs, radiology results, and diagnostic studies.    Laboratory Data:   Results from last 7 days   Lab Units 10/03/21  1121 10/02/21  0509 10/02/21  0200   SODIUM mmol/L 129* 129* 125*   POTASSIUM mmol/L 3.6 3.5 3.7   CHLORIDE mmol/L 97* 92* 88*   CO2 mmol/L 21.0* 21.0* 21.0*   BUN mg/dL 13 11 12   CREATININE mg/dL 0.89 1.01* 1.01*   GLUCOSE mg/dL 221* 355* 440*   CALCIUM mg/dL 8.7 8.9 9.6   BILIRUBIN mg/dL 0.7  --   --    ALK PHOS U/L 122*  --   --    ALT (SGPT) U/L 17  --   --    AST (SGOT) U/L 15  --   --    ANION GAP mmol/L 11.0 16.0* 16.0*     Estimated Creatinine Clearance: 108.8 mL/min (by C-G formula based on SCr of 0.89 mg/dL).  Results from last 7 days   Lab Units 10/03/21  1121   MAGNESIUM mg/dL 1.7         Results from last 7  days   Lab Units 10/03/21  1121 10/02/21  0200   WBC 10*3/mm3 8.21 19.92*   HEMOGLOBIN g/dL 10.5* 14.4   HEMATOCRIT % 30.2* 40.5   PLATELETS 10*3/mm3 106* 152           Culture Data:   Blood Culture   Date Value Ref Range Status   10/02/2021 No growth at 24 hours  Preliminary   10/02/2021 Abnormal Stain (C)  Preliminary     Urine Culture   Date Value Ref Range Status   10/02/2021 50,000 CFU/mL Escherichia coli (A)  Preliminary   10/02/2021 25,000 CFU/mL Mixed Kayla Isolated  Preliminary     No results found for: RESPCX  No results found for: WOUNDCX  No results found for: STOOLCX  No components found for: BODYFLD    Radiology Data:   Imaging Results (Last 24 Hours)     Procedure Component Value Units Date/Time    US Guided Vascular Access [[id number]] Collected: 10/03/21 1100     Updated: 10/03/21 1237    Narrative:      PROCEDURE: Ultrasound Guidance Vascular Access      Ordering physician(s): ОЛЬГА ROSS    Clinical Indication:  Intravenous access.     Findings:    The right basilic vein was sonographically evaluated and  determined to be patent. Concurrent realtime ultrasound was used  to visualize needle entry into the right basilic vein and a  permanent image  was stored for permanent recording and  reporting.       Impression:      Impression:     Ultrasound guidance utilized for intravenous access as above.    [id number]    Electronically signed by:  Adam Jean MD  10/3/2021 12:36 PM  CDT Workstation: [id number]    IR Insert Midline Without Port Pump 5 Plus [[id number]] Resulted: 10/03/21 1131     Updated: 10/03/21 1131    Narrative:      This procedure was auto-finalized with no dictation required.    XR Chest 1 View [[id number]] Collected: 10/03/21 0438     Updated: 10/03/21 0551    Narrative:        CHEST X-RAY 1 VIEW on 10/3/2021     CLINICAL INDICATION: Sepsis, Covid 19, hypoxemia    COMPARISON: 10/2/2021    FINDINGS: There is mild elevation of the right hemidiaphragm.  There are mild developing bilateral  interstitial opacities which  based upon the patient's history is suggestive of mild developing  Covid 19 pneumonia. Cardiac, hilar and mediastinal contours are  within normal limits. No bony abnormality is noted.      Impression:      Mild developing bilateral interstitial opacities  suggesting developing Covid 19 pneumonia.    Electronically signed by:  Kutr Cole  10/3/2021 5:50 AM  CDT Workstation: [id number]          I have reviewed the patient's current medications.     Assessment/Plan     Active Hospital Problems    Diagnosis    • **Sepsis due to urinary tract infection (HCC)    • Acute pyelonephritis    • E coli bacteremia    • COVID-19 virus infection    • Obesity (BMI 30-39.9)    • Type 2 diabetes mellitus without complication, without long-term current use of insulin (HCC)        Plan:    Cefepime, Levaquin  Follow cultures  IV fluid:  ml/hr  Tylenol for fever  Reports she was diagnosed with COVID initially approximately 2 weeks ago.  Remdesivir not indicated due to duration of illness.  Decadron day 1  Albuterol MDI, Atrovent MDI, Symbicort  Incentive spirometry, OPEP  Glucose control: Levemir 20 units BID, Novolog 10 units TIDWM, SSI 0-24 units  VTE PPx: lovenox    I confirmed that the patient's Advance Care Plan is present, code status is documented, or surrogate decision maker is listed in the patient's medical record.     The patient was evaluated during the global COVID-19 pandemic, and the diagnosis was suspected/considered upon their initial presentation.  Evaluation, treatment, and testing were consistent with current guidelines for patients who present with complaints or symptoms that may be related to COVID-19.          This document has been electronically signed by ALDAIR Wilkes on October 3, 2021 12:52 CDT       Electronically signed by Christian aMn APRN at 10/03/21 0650     Christian Man APRN at 10/02/21 7354     Attestation signed by  Jorge Hinojosa MD at 10/02/21 1852    I personally evaluated and examined the patient in conjunction with ALDAIR Fisher and agree with the assessment, treatment plan, and disposition of the patient as recorded.  Lungs: Clear                            [facility] Medicine Services  INPATIENT PROGRESS NOTE    Length of Stay: 0  Date of Admission: 10/2/2021  Primary Care Physician: Beverley Dunne APRN    Subjective   Chief Complaint: fever, flank pain  HPI:  31 year old female with a history of DMII and obesity who presented with fever, chills, cough, and right sided flank/chest pain.  She was COVID-19 positive and UA was consistent with UTI.  CT of the abdomen revealed findings consistent with severe pyelonephritis.      Review of Systems   Constitutional: Positive for chills and fever.   Respiratory: Positive for cough. Negative for shortness of breath.    Cardiovascular: Negative for chest pain.   Gastrointestinal: Positive for nausea. Negative for abdominal pain and vomiting.   Genitourinary: Positive for dysuria and flank pain.        All pertinent negatives and positives are as above. All other systems have been reviewed and are negative unless otherwise stated.     Objective    Temp:  [99.7 °F (37.6 °C)-101 °F (38.3 °C)] 100 °F (37.8 °C)  Heart Rate:  [113-136] 119  Resp:  [16-22] 16  BP: (113-139)/(59-79) 121/78    Physical Exam  Vitals reviewed.   Constitutional:       General: She is not in acute distress.     Appearance: She is well-developed. She is ill-appearing, toxic-appearing and diaphoretic.   HENT:      Head: Normocephalic and atraumatic.   Eyes:      Conjunctiva/sclera: Conjunctivae normal.   Cardiovascular:      Rate and Rhythm: Regular rhythm. Tachycardia present.   Pulmonary:      Effort: Pulmonary effort is normal. No respiratory distress.      Breath sounds: Normal breath sounds.   Abdominal:      General: Bowel sounds are normal. There is no  distension.      Palpations: Abdomen is soft.      Tenderness: There is abdominal tenderness (right sided).   Musculoskeletal:         General: Tenderness (right flank and CVA) present. No swelling or deformity.   Skin:     General: Skin is warm.   Neurological:      Mental Status: She is alert and oriented to person, place, and time.             Results Review:  I have reviewed the labs, radiology results, and diagnostic studies.    Laboratory Data:   Results from last 7 days   Lab Units 10/02/21  0509 10/02/21  0200   SODIUM mmol/L 129* 125*   POTASSIUM mmol/L 3.5 3.7   CHLORIDE mmol/L 92* 88*   CO2 mmol/L 21.0* 21.0*   BUN mg/dL 11 12   CREATININE mg/dL 1.01* 1.01*   GLUCOSE mg/dL 355* 440*   CALCIUM mg/dL 8.9 9.6   ANION GAP mmol/L 16.0* 16.0*     Estimated Creatinine Clearance: 92.8 mL/min (A) (by C-G formula based on SCr of 1.01 mg/dL (H)).          Results from last 7 days   Lab Units 10/02/21  0200   WBC 10*3/mm3 19.92*   HEMOGLOBIN g/dL 14.4   HEMATOCRIT % 40.5   PLATELETS 10*3/mm3 152           Culture Data:   No results found for: BLOODCX  No results found for: URINECX  No results found for: RESPCX  No results found for: WOUNDCX  No results found for: STOOLCX  No components found for: BODYFLD    Radiology Data:   Imaging Results (Last 24 Hours)     Procedure Component Value Units Date/Time    CT Abdomen Pelvis Without Contrast [[id number]] Collected: 10/02/21 0705     Updated: 10/02/21 0736    Narrative:      CT abdomen, pelvis without contrast.       CLINICAL INDICATION: Right flank pain. Fever and sepsis.     COMPARISON: CT chest October 2, 2021     TECHNIQUE: Noncontrast helical scanning with axial and coronal  reformations. Soft tissue, lung, and bone windows reviewed.    This exam was performed according to our departmental  dose-optimization program, which includes automated exposure  control, adjustment of the mA and/or kV according to patient size  and/or use of iterative reconstruction  technique.    ABDOMEN CT FINDINGS: Fatty liver. Liver is otherwise normal.  Enlargement of right kidney. Retention of contrast in right  kidney from recent CT chest exam. Abnormal striated appearance  diagnostic for severe pyelonephritis. Perinephric inflammatory  changes. No evidence of hydronephrotic changes.    Normal left kidney by comparison.    Normal bowel. Normal appendix. Contrast within a normal-appearing  urinary bladder from recent CT chest exam.      Impression:      Abnormal enlarged right kidney with abnormal striated  type enhancement and perinephric inflammatory changes diagnostic  for severe pyelonephritis. No evidence of a hydronephrotic  changes.    These findings discussed with Dr. Behroozi at 7:34 AM.    Electronically signed by:  Jorge Luis Lombardo MD  10/2/2021 7:35 AM CDT  Workstation: [id number]    CT Angiogram Chest [[id number]] Collected: 10/02/21 0320     Updated: 10/02/21 0401    Narrative:      EXAM:    CT Angiography Chest With Intravenous Contrast    CLINICAL HISTORY:    The patient is 31 years old and is Female; soa    TECHNIQUE:    Axial computed tomographic angiography images of the chest with  intravenous contrast.  Sagittal and coronal reformatted images  were created and reviewed.  This CT exam was performed using one  or more of the following dose reduction techniques:  automated  exposure control, adjustment of the mA and/or kV according to  patient size, and/or use of iterative reconstruction technique.    MIP reconstructed images were created and reviewed.    COMPARISON:    No relevant prior studies available.    FINDINGS:    PULMONARY ARTERIES:  There are no obvious filling defects  identified within the pulmonary arteries to suggest pulmonary  embolism.    AORTA:  No acute findings.  No thoracic aortic aneurysm.    LUNGS:  Area of linear atelectasis in the lingula and right  middle lobe are present. The lungs are otherwise clear. The  tracheobronchial tree is widely patent.  No  mass.    PLEURAL SPACE:  Unremarkable.  No significant effusion.  No  pneumothorax.    HEART:  Unremarkable.  No cardiomegaly.  No significant  pericardial effusion.  No evidence of RV dysfunction.    BONES/JOINTS:  No acute fracture.  No dislocation.    SOFT TISSUES:  Unremarkable.    LYMPH NODES:  Unremarkable.  No enlarged lymph nodes.    LIVER:  The liver is enlarged and diffusely fatty.      Impression:      1.  No evidence of pulmonary embolism.  2.  Findings suggest areas of atelectasis within the right middle  lobe and lingula.    Electronically signed by:  Shanon Bethea MD  10/2/2021 4:00 AM  CDT Workstation: [id number]    XR Chest 1 View [[id number]] Collected: 10/02/21 0153     Updated: 10/02/21 0213    Narrative:      EXAM:    XR Chest, 1 View    CLINICAL HISTORY:    The patient is 31 years old and is Female; soa    TECHNIQUE:    Frontal view of the chest.    COMPARISON:    No relevant prior studies available.    FINDINGS:    LUNGS:  Unremarkable.  No consolidation.    PLEURAL SPACE:  Unremarkable.  No pneumothorax.    HEART:  Unremarkable.  No cardiomegaly.    MEDIASTINUM:  Unremarkable.    BONES/JOINTS:  Unremarkable.    UPPER ABDOMEN:  Unremarkable as visualized.      Impression:        No acute cardiopulmonary process.    Electronically signed by:  Shanon Bethea MD  10/2/2021 2:11 AM  CDT Workstation: [id number]          I have reviewed the patient's current medications.     Assessment/Plan     Active Hospital Problems    Diagnosis    • **Sepsis due to urinary tract infection (HCC)    • Acute pyelonephritis    • COVID-19 virus infection    • Obesity (BMI 30-39.9)    • Type 2 diabetes mellitus without complication, without long-term current use of insulin (HCC)        Plan:    Levaquin  Follow cultures  IV fluid:  ml/hr  Tylenol for fever  No shortness of breath. Does not require oxygen.  No indication for remdesivir or steroids.  Glucose control: SSI 0-14 units  VTE PPx: lovenox  I confirmed  that the patient's Advance Care Plan is present, code status is documented, or surrogate decision maker is listed in the patient's medical record.     The patient was evaluated during the global COVID-19 pandemic, and the diagnosis was suspected/considered upon their initial presentation.  Evaluation, treatment, and testing were consistent with current guidelines for patients who present with complaints or symptoms that may be related to COVID-19.          This document has been electronically signed by ALDAIR Wilkes on October 2, 2021 13:23 CDT       Electronically signed by Jorge Hinojosa MD at 10/02/21 1852       Consult Notes (last 48 hours) (Notes from 10/02/21 1044 through 10/04/21 1044)    No notes of this type exist for this encounter.

## 2021-10-05 ENCOUNTER — READMISSION MANAGEMENT (OUTPATIENT)
Dept: CALL CENTER | Facility: HOSPITAL | Age: 31
End: 2021-10-05

## 2021-10-05 VITALS
OXYGEN SATURATION: 95 % | TEMPERATURE: 97.2 F | SYSTOLIC BLOOD PRESSURE: 159 MMHG | RESPIRATION RATE: 18 BRPM | DIASTOLIC BLOOD PRESSURE: 80 MMHG | BODY MASS INDEX: 42.76 KG/M2 | HEIGHT: 62 IN | HEART RATE: 64 BPM | WEIGHT: 232.4 LBS

## 2021-10-05 PROBLEM — D89.832 CYTOKINE RELEASE SYNDROME, GRADE 2: Status: ACTIVE | Noted: 2021-10-05

## 2021-10-05 PROBLEM — D89.831 CYTOKINE RELEASE SYNDROME, GRADE 1: Status: ACTIVE | Noted: 2021-10-05

## 2021-10-05 LAB
ALBUMIN SERPL-MCNC: 3.1 G/DL (ref 3.5–5.2)
ALBUMIN/GLOB SERPL: 0.9 G/DL
ALP SERPL-CCNC: 180 U/L (ref 39–117)
ALT SERPL W P-5'-P-CCNC: 45 U/L (ref 1–33)
ANION GAP SERPL CALCULATED.3IONS-SCNC: 10 MMOL/L (ref 5–15)
AST SERPL-CCNC: 47 U/L (ref 1–32)
BASOPHILS # BLD AUTO: 0.04 10*3/MM3 (ref 0–0.2)
BASOPHILS NFR BLD AUTO: 0.4 % (ref 0–1.5)
BILIRUB CONJ SERPL-MCNC: 0.3 MG/DL (ref 0–0.3)
BILIRUB SERPL-MCNC: 0.5 MG/DL (ref 0–1.2)
BUN SERPL-MCNC: 19 MG/DL (ref 6–20)
BUN/CREAT SERPL: 23.2 (ref 7–25)
CALCIUM SPEC-SCNC: 9.2 MG/DL (ref 8.6–10.5)
CHLORIDE SERPL-SCNC: 104 MMOL/L (ref 98–107)
CO2 SERPL-SCNC: 22 MMOL/L (ref 22–29)
CREAT SERPL-MCNC: 0.82 MG/DL (ref 0.57–1)
DEPRECATED RDW RBC AUTO: 44.8 FL (ref 37–54)
EOSINOPHIL # BLD AUTO: 0 10*3/MM3 (ref 0–0.4)
EOSINOPHIL NFR BLD AUTO: 0 % (ref 0.3–6.2)
ERYTHROCYTE [DISTWIDTH] IN BLOOD BY AUTOMATED COUNT: 14 % (ref 12.3–15.4)
GFR SERPL CREATININE-BSD FRML MDRD: 82 ML/MIN/1.73
GLOBULIN UR ELPH-MCNC: 3.3 GM/DL
GLUCOSE BLDC GLUCOMTR-MCNC: 316 MG/DL (ref 70–130)
GLUCOSE BLDC GLUCOMTR-MCNC: 351 MG/DL (ref 70–130)
GLUCOSE SERPL-MCNC: 283 MG/DL (ref 65–99)
HCT VFR BLD AUTO: 33.1 % (ref 34–46.6)
HGB BLD-MCNC: 11.1 G/DL (ref 12–15.9)
IMM GRANULOCYTES # BLD AUTO: 0.22 10*3/MM3 (ref 0–0.05)
IMM GRANULOCYTES NFR BLD AUTO: 2.2 % (ref 0–0.5)
LYMPHOCYTES # BLD AUTO: 1.2 10*3/MM3 (ref 0.7–3.1)
LYMPHOCYTES NFR BLD AUTO: 11.7 % (ref 19.6–45.3)
MCH RBC QN AUTO: 29.3 PG (ref 26.6–33)
MCHC RBC AUTO-ENTMCNC: 33.5 G/DL (ref 31.5–35.7)
MCV RBC AUTO: 87.3 FL (ref 79–97)
MONOCYTES # BLD AUTO: 0.67 10*3/MM3 (ref 0.1–0.9)
MONOCYTES NFR BLD AUTO: 6.6 % (ref 5–12)
NEUTROPHILS NFR BLD AUTO: 79.1 % (ref 42.7–76)
NEUTROPHILS NFR BLD AUTO: 8.09 10*3/MM3 (ref 1.7–7)
NRBC BLD AUTO-RTO: 0 /100 WBC (ref 0–0.2)
PLATELET # BLD AUTO: 174 10*3/MM3 (ref 140–450)
PMV BLD AUTO: 11 FL (ref 6–12)
POTASSIUM SERPL-SCNC: 4.6 MMOL/L (ref 3.5–5.2)
PROT SERPL-MCNC: 6.4 G/DL (ref 6–8.5)
RBC # BLD AUTO: 3.79 10*6/MM3 (ref 3.77–5.28)
SODIUM SERPL-SCNC: 136 MMOL/L (ref 136–145)
WBC # BLD AUTO: 10.22 10*3/MM3 (ref 3.4–10.8)

## 2021-10-05 PROCEDURE — 25010000002 CEFTRIAXONE PER 250 MG: Performed by: NURSE PRACTITIONER

## 2021-10-05 PROCEDURE — 85025 COMPLETE CBC W/AUTO DIFF WBC: CPT | Performed by: NURSE PRACTITIONER

## 2021-10-05 PROCEDURE — 94799 UNLISTED PULMONARY SVC/PX: CPT

## 2021-10-05 PROCEDURE — 82962 GLUCOSE BLOOD TEST: CPT

## 2021-10-05 PROCEDURE — 25010000002 SODIUM CHLORIDE 0.9 % WITH KCL 20 MEQ 20-0.9 MEQ/L-% SOLUTION: Performed by: NURSE PRACTITIONER

## 2021-10-05 PROCEDURE — 25010000002 DEXAMETHASONE PER 1 MG: Performed by: NURSE PRACTITIONER

## 2021-10-05 PROCEDURE — 82248 BILIRUBIN DIRECT: CPT | Performed by: NURSE PRACTITIONER

## 2021-10-05 PROCEDURE — 63710000001 INSULIN ASPART PER 5 UNITS: Performed by: NURSE PRACTITIONER

## 2021-10-05 PROCEDURE — 63710000001 INSULIN DETEMIR PER 5 UNITS: Performed by: NURSE PRACTITIONER

## 2021-10-05 PROCEDURE — 80053 COMPREHEN METABOLIC PANEL: CPT | Performed by: NURSE PRACTITIONER

## 2021-10-05 PROCEDURE — 25010000002 ENOXAPARIN PER 10 MG: Performed by: INTERNAL MEDICINE

## 2021-10-05 RX ORDER — CEFDINIR 300 MG/1
300 CAPSULE ORAL 2 TIMES DAILY
Qty: 16 CAPSULE | Refills: 0 | Status: SHIPPED | OUTPATIENT
Start: 2021-10-05 | End: 2021-10-13

## 2021-10-05 RX ORDER — HYDROCODONE BITARTRATE AND ACETAMINOPHEN 5; 325 MG/1; MG/1
1 TABLET ORAL EVERY 6 HOURS PRN
Qty: 10 TABLET | Refills: 0 | Status: SHIPPED | OUTPATIENT
Start: 2021-10-05 | End: 2021-10-09

## 2021-10-05 RX ADMIN — INSULIN ASPART 15 UNITS: 100 INJECTION, SOLUTION INTRAVENOUS; SUBCUTANEOUS at 08:35

## 2021-10-05 RX ADMIN — SERTRALINE 50 MG: 50 TABLET, FILM COATED ORAL at 08:34

## 2021-10-05 RX ADMIN — CETIRIZINE HYDROCHLORIDE 10 MG: 10 TABLET, FILM COATED ORAL at 08:34

## 2021-10-05 RX ADMIN — BUDESONIDE AND FORMOTEROL FUMARATE DIHYDRATE 2 PUFF: 160; 4.5 AEROSOL RESPIRATORY (INHALATION) at 07:34

## 2021-10-05 RX ADMIN — SODIUM CHLORIDE, PRESERVATIVE FREE 10 ML: 5 INJECTION INTRAVENOUS at 08:34

## 2021-10-05 RX ADMIN — INSULIN ASPART 12 UNITS: 100 INJECTION, SOLUTION INTRAVENOUS; SUBCUTANEOUS at 08:34

## 2021-10-05 RX ADMIN — INSULIN ASPART 15 UNITS: 100 INJECTION, SOLUTION INTRAVENOUS; SUBCUTANEOUS at 11:26

## 2021-10-05 RX ADMIN — POTASSIUM CHLORIDE AND SODIUM CHLORIDE 75 ML/HR: 900; 150 INJECTION, SOLUTION INTRAVENOUS at 05:33

## 2021-10-05 RX ADMIN — HYDROCODONE BITARTRATE AND ACETAMINOPHEN 1 TABLET: 5; 325 TABLET ORAL at 08:43

## 2021-10-05 RX ADMIN — DEXAMETHASONE SODIUM PHOSPHATE 6 MG: 4 INJECTION, SOLUTION INTRAMUSCULAR; INTRAVENOUS at 08:33

## 2021-10-05 RX ADMIN — INSULIN DETEMIR 25 UNITS: 100 INJECTION, SOLUTION SUBCUTANEOUS at 08:42

## 2021-10-05 RX ADMIN — ENOXAPARIN SODIUM 40 MG: 40 INJECTION SUBCUTANEOUS at 08:34

## 2021-10-05 RX ADMIN — INSULIN ASPART 20 UNITS: 100 INJECTION, SOLUTION INTRAVENOUS; SUBCUTANEOUS at 11:26

## 2021-10-05 RX ADMIN — ALBUTEROL SULFATE 2 PUFF: 90 AEROSOL, METERED RESPIRATORY (INHALATION) at 07:34

## 2021-10-05 RX ADMIN — CEFTRIAXONE SODIUM 2 G: 2 INJECTION, POWDER, FOR SOLUTION INTRAMUSCULAR; INTRAVENOUS at 08:35

## 2021-10-05 RX ADMIN — IPRATROPIUM BROMIDE 2 PUFF: 17 AEROSOL, METERED RESPIRATORY (INHALATION) at 07:34

## 2021-10-05 RX ADMIN — GUAIFENESIN AND CODEINE PHOSPHATE 5 ML: 100; 10 SOLUTION ORAL at 08:34

## 2021-10-05 RX ADMIN — FAMOTIDINE 20 MG: 20 TABLET ORAL at 08:34

## 2021-10-05 NOTE — PLAN OF CARE
Goal Outcome Evaluation:  Plan of Care Reviewed With: patient        Progress: no change  Outcome Summary: Patient received sitting up in bed awake alert and oriented. VS taken stable. offers no complaints, slept well throughout the night. condition remains unchanged will continue to monitor.

## 2021-10-05 NOTE — DISCHARGE SUMMARY
[facility] Medicine Services  DISCHARGE SUMMARY       Date of Admission: 10/2/2021  Date of Discharge:  10/5/2021  Primary Care Physician: Beverley Dunne APRN    Presenting Problem/History of Present Illness:  UTI (urinary tract infection), bacterial [N39.0, A49.9]  Type 2 diabetes mellitus with hyperglycemia, unspecified whether long term insulin use (HCC) [E11.65]  Sepsis, due to unspecified organism, unspecified whether acute organ dysfunction present (HCC) [A41.9]  COVID-19 virus infection [U07.1]     Final Discharge Diagnoses:    Sepsis due to urinary tract infection (HCC)    Acute pyelonephritis    Type 2 diabetes mellitus without complication, without long-term current use of insulin (HCC)    Obesity (BMI 30-39.9)    COVID-19 virus infection    E coli bacteremia    Cytokine release syndrome, grade 2      Consults:   Consults     Date and Time Order Name Status Description    10/2/2021  5:59 AM Hospitalist (on-call MD unless specified)            Pertinent Test Results:   CT Abdomen Pelvis Without Contrast    Result Date: 10/2/2021  CT abdomen, pelvis without contrast. CLINICAL INDICATION: Right flank pain. Fever and sepsis. COMPARISON: CT chest October 2, 2021 TECHNIQUE: Noncontrast helical scanning with axial and coronal reformations. Soft tissue, lung, and bone windows reviewed. This exam was performed according to our departmental dose-optimization program, which includes automated exposure control, adjustment of the mA and/or kV according to patient size and/or use of iterative reconstruction technique. ABDOMEN CT FINDINGS: Fatty liver. Liver is otherwise normal. Enlargement of right kidney. Retention of contrast in right kidney from recent CT chest exam. Abnormal striated appearance diagnostic for severe pyelonephritis. Perinephric inflammatory changes. No evidence of hydronephrotic changes. Normal left kidney by comparison. Normal bowel. Normal appendix.  Contrast within a normal-appearing urinary bladder from recent CT chest exam.     Abnormal enlarged right kidney with abnormal striated type enhancement and perinephric inflammatory changes diagnostic for severe pyelonephritis. No evidence of a hydronephrotic changes. These findings discussed with Dr. Behroozi at 7:34 AM. Electronically signed by:  Jorge Luis Lombardo MD  10/2/2021 7:35 AM CDT Workstation: [id number]    US Guided Vascular Access    Result Date: 10/3/2021  PROCEDURE: Ultrasound Guidance Vascular Access Ordering physician(s): ОЛЬГА ROSS Clinical Indication:  Intravenous access. Findings: The right basilic vein was sonographically evaluated and determined to be patent. Concurrent realtime ultrasound was used to visualize needle entry into the right basilic vein and a permanent image  was stored for permanent recording and reporting.     Impression: Ultrasound guidance utilized for intravenous access as above. [id number] Electronically signed by:  Adam Jean MD  10/3/2021 12:36 PM CDT Workstation: [id number]    XR Chest 1 View    Result Date: 10/3/2021  CHEST X-RAY 1 VIEW on 10/3/2021 CLINICAL INDICATION: Sepsis, Covid 19, hypoxemia COMPARISON: 10/2/2021 FINDINGS: There is mild elevation of the right hemidiaphragm. There are mild developing bilateral interstitial opacities which based upon the patient's history is suggestive of mild developing Covid 19 pneumonia. Cardiac, hilar and mediastinal contours are within normal limits. No bony abnormality is noted.     Mild developing bilateral interstitial opacities suggesting developing Covid 19 pneumonia. Electronically signed by:  Kurt Cole  10/3/2021 5:50 AM CDT Workstation: [id number]    XR Chest 1 View    Result Date: 10/2/2021  EXAM:   XR Chest, 1 View CLINICAL HISTORY:   The patient is 31 years old and is Female; soa TECHNIQUE:   Frontal view of the chest. COMPARISON:   No relevant prior studies available. FINDINGS:   LUNGS:  Unremarkable.  No  consolidation.   PLEURAL SPACE:  Unremarkable.  No pneumothorax.   HEART:  Unremarkable.  No cardiomegaly.   MEDIASTINUM:  Unremarkable.   BONES/JOINTS:  Unremarkable.   UPPER ABDOMEN:  Unremarkable as visualized.       No acute cardiopulmonary process. Electronically signed by:  Shanon Bethea MD  10/2/2021 2:11 AM CDT Workstation: [id number]    IR Insert Midline Without Port Pump 5 Plus    Result Date: 10/3/2021  This procedure was auto-finalized with no dictation required.    CT Angiogram Chest    Result Date: 10/2/2021  EXAM:   CT Angiography Chest With Intravenous Contrast CLINICAL HISTORY:   The patient is 31 years old and is Female; soa TECHNIQUE:   Axial computed tomographic angiography images of the chest with intravenous contrast.  Sagittal and coronal reformatted images were created and reviewed.  This CT exam was performed using one or more of the following dose reduction techniques:  automated exposure control, adjustment of the mA and/or kV according to patient size, and/or use of iterative reconstruction technique.   MIP reconstructed images were created and reviewed. COMPARISON:   No relevant prior studies available. FINDINGS:   PULMONARY ARTERIES:  There are no obvious filling defects identified within the pulmonary arteries to suggest pulmonary embolism.   AORTA:  No acute findings.  No thoracic aortic aneurysm.   LUNGS:  Area of linear atelectasis in the lingula and right middle lobe are present. The lungs are otherwise clear. The tracheobronchial tree is widely patent.  No mass.   PLEURAL SPACE:  Unremarkable.  No significant effusion.  No pneumothorax.   HEART:  Unremarkable.  No cardiomegaly.  No significant pericardial effusion.  No evidence of RV dysfunction.   BONES/JOINTS:  No acute fracture.  No dislocation.   SOFT TISSUES:  Unremarkable.   LYMPH NODES:  Unremarkable.  No enlarged lymph nodes.   LIVER:  The liver is enlarged and diffusely fatty.     1.  No evidence of pulmonary embolism.  "2.  Findings suggest areas of atelectasis within the right middle lobe and lingula. Electronically signed by:  Shanon Bethea MD  10/2/2021 4:00 AM CDT Workstation: [id number]      HPI/Hospital Course:  The patient is a 31 y.o. female with a history of DMII and obesity who presented to [facility] with fever, chills, cough, and right sided flank/chest pain. She was COVID-19 positive and UA was consistent with UTI.  CT of the abdomen revealed findings consistent with severe pyelonephritis.  Blood cultures grew E. Coli.  Antibiotics were de-escalated based on culture.  She is afebrile over 48 hours.  She is on room air.  She will complete a total of 10 days of antibiotics. She is stable and discharged to home.       Condition on Discharge:  Stable    Physical Exam on Discharge:  /80 (BP Location: Left arm, Patient Position: Lying)   Pulse 64   Temp 97.2 °F (36.2 °C) (Oral)   Resp 18   Ht 157.5 cm (62\")   Wt 105 kg (232 lb 6.4 oz)   LMP 09/18/2021   SpO2 95%   Breastfeeding No   BMI 42.51 kg/m²   Physical Exam  Vitals reviewed.   Constitutional:       General: She is not in acute distress.     Appearance: She is well-developed.  HENT:      Head: Normocephalic and atraumatic.   Eyes:      Conjunctiva/sclera: Conjunctivae normal.   Cardiovascular:      Rate and Rhythm: Normal rate and regular rhythm.   Pulmonary:      Effort: Pulmonary effort is normal. No respiratory distress.      Breath sounds: Normal breath sounds.   Abdominal:      General: Bowel sounds are normal. There is no distension.      Palpations: Abdomen is soft.   Musculoskeletal:         General: No swelling or deformity.   Skin:     General: Skin is warm and dry.   Neurological:      General: No focal deficit present.      Mental Status: She is alert and oriented to person, place, and time.     Discharge Disposition:  Home or Self Care    Discharge Medications:     Discharge Medications      New Medications      " "Instructions Start Date   cefdinir 300 MG capsule  Commonly known as: OMNICEF   300 mg, Oral, 2 Times Daily      HYDROcodone-acetaminophen 5-325 MG per tablet  Commonly known as: NORCO   Take 1 tablet by mouth Every 6 (Six) Hours As Needed for Moderate Pain for up to 4 days.         Continue These Medications      Instructions Start Date   albuterol sulfate  (90 Base) MCG/ACT inhaler  Commonly known as: Ventolin HFA   2 puffs, Inhalation, Every 4 Hours PRN      Alcohol Wipes 70 % pads   Use 4 x daily      B-D ULTRA-FINE 33 LANCETS misc   Use 4 x daily , any brand covered by insurance      BASAGLAR KWIKPEN 100 UNIT/ML injection pen   15 units at night      Blood Glucose Monitoring Suppl w/Device kit   USE AS INDICATED, ANY MONITOR , ICD10 code is E11.9      Blood Glucose Test strip   Use 4 x daily use any brand covered by insurance or same brand as before ICD10 code is E11.9      Ertugliflozin L-PyroglutamicAc 5 MG tablet  Commonly known as: Steglatro   5 mg, Oral, Every Morning      insulin lispro 100 UNIT/ML injection  Commonly known as: Admelog   100 Units, Subcutaneous, Daily      Insulin Syringe 31G X 5/16\" 0.5 ML misc   Use 4 x daily       Insulin Syringes (Disposable) U-100 0.5 ML misc   1 each, Does not apply, 3 Times Daily      Lancing Device misc   USE AS INDICATED TO CORRELATE WITH STRIPS AND METER      loratadine 10 MG tablet  Commonly known as: CLARITIN   10 mg, Oral, Daily      Ozempic (1 MG/DOSE) 2 MG/1.5ML solution pen-injector  Generic drug: Semaglutide (1 MG/DOSE)  Notes to patient: As directed   1 mg, Subcutaneous, Weekly      Pen Needles 32G X 4 MM misc   1 each, Does not apply, 4 Times Daily, Use 4 x daily, Dx code E11.65      propranolol 20 MG tablet  Commonly known as: INDERAL   20 mg, 3 Times Daily PRN      pseudoephedrine 30 MG tablet  Commonly known as: SUDAFED   30 mg, Oral, 2 Times Daily      sertraline 50 MG tablet  Commonly known as: ZOLOFT   50 mg, Oral, Daily      sertraline 100 " MG tablet  Commonly known as: ZOLOFT   No dose, route, or frequency recorded.           Discharge Diet:   Diet Instructions     Diet: Regular, Consistent Carbohydrate; Thin      Discharge Diet:  Regular  Consistent Carbohydrate       Fluid Consistency: Thin          Activity at Discharge:   Activity Instructions     Activity as Tolerated          Follow-up Appointments:   1. PCP 1 week    Test Results Pending at Discharge:   Pending Labs     Order Current Status    Blood Culture - Blood, Arm, Left Preliminary result          Christian Man, ALADIR  10/05/21  12:55 CDT    Time: Discharge planning and teaching took greater than 30 minutes.

## 2021-10-06 ENCOUNTER — READMISSION MANAGEMENT (OUTPATIENT)
Dept: CALL CENTER | Facility: HOSPITAL | Age: 31
End: 2021-10-06

## 2021-10-06 NOTE — PAYOR COMM NOTE
"Elizabeth Shettyisra   [facility]  phone [phone]  fax [phone]    Auth# [id number]    Mallorie Gonzalez (31 y.o. Female)     Date of Birth Social Security Number Address Home Phone MRN    1990  [address] [place], [place] [id number] [phone] [id number]    Shinto Marital Status          None        Admission Date Admission Type Admitting Provider Attending Provider Department, Room/Bed    10/2/21 Emergency Behroozi, Saeid, MD  [address], 420/1    Discharge Date Discharge Disposition Discharge Destination        10/5/2021 Home or Self Care              Attending Provider: (none)   Allergies: No Known Allergies    Isolation: None   Infection: COVID (confirmed) (10/02/21)   Code Status: Prior    Ht: 157.5 cm (62\")   Wt: 105 kg (232 lb 6.4 oz)    Admission Cmt: None   Principal Problem: Sepsis due to urinary tract infection (HCC) [A41.9,N39.0]                 Active Insurance as of 10/2/2021     Primary Coverage     Payor Plan Insurance Group Employer/Plan Group    [facility] [employer] [facility] [employer] [place]      Payor Plan Address Payor Plan Phone Number Payor Plan Fax Number Effective Dates    [address] [address]   6/1/2015 - None [place] [address]       Subscriber Name Subscriber Birth Date Member ID       MALLORIE GONZALEZ 1990 [id number]                 Emergency Contacts      (Rel.) Home Phone Work Phone Mobile Phone    MeetaMalik (Spouse) [phone] -- [phone]               Discharge Summary      Christian Man APRN at 10/05/21 1225              [facility] Medicine Services  DISCHARGE SUMMARY       Date of Admission: 10/2/2021  Date of Discharge:  10/5/2021  Primary Care Physician: Beverley Dunne APRN    Presenting Problem/History of Present Illness:  UTI (urinary tract infection), bacterial [N39.0, A49.9]  Type 2 " diabetes mellitus with hyperglycemia, unspecified whether long term insulin use ([facility]) [E11.65]  Sepsis, due to unspecified organism, unspecified whether acute organ dysfunction present ([facility]) [A41.9]  COVID-19 virus infection [U07.1]     Final Discharge Diagnoses:    Sepsis due to urinary tract infection ([facility])    Acute pyelonephritis    Type 2 diabetes mellitus without complication, without long-term current use of insulin ([facility])    Obesity (BMI 30-39.9)    COVID-19 virus infection    E coli bacteremia    Cytokine release syndrome, grade 2      Consults:   Consults     Date and Time Order Name Status Description    10/2/2021  5:59 AM Hospitalist (on-call MD unless specified)            Pertinent Test Results:   CT Abdomen Pelvis Without Contrast    Result Date: 10/2/2021  CT abdomen, pelvis without contrast. CLINICAL INDICATION: Right flank pain. Fever and sepsis. COMPARISON: CT chest October 2, 2021 TECHNIQUE: Noncontrast helical scanning with axial and coronal reformations. Soft tissue, lung, and bone windows reviewed. This exam was performed according to our departmental dose-optimization program, which includes automated exposure control, adjustment of the mA and/or kV according to patient size and/or use of iterative reconstruction technique. ABDOMEN CT FINDINGS: Fatty liver. Liver is otherwise normal. Enlargement of right kidney. Retention of contrast in right kidney from recent CT chest exam. Abnormal striated appearance diagnostic for severe pyelonephritis. Perinephric inflammatory changes. No evidence of hydronephrotic changes. Normal left kidney by comparison. Normal bowel. Normal appendix. Contrast within a normal-appearing urinary bladder from recent CT chest exam.     Abnormal enlarged right kidney with abnormal striated type enhancement and perinephric inflammatory changes diagnostic for severe pyelonephritis. No evidence of a hydronephrotic changes. These findings discussed with Dr. Behroozi at 7:34 AM.  Electronically signed by:  Jorge Luis Lombardo MD  10/2/2021 7:35 AM CDT Workstation: [id number]    US Guided Vascular Access    Result Date: 10/3/2021  PROCEDURE: Ultrasound Guidance Vascular Access Ordering physician(s): ОЛЬГА ROSS Clinical Indication:  Intravenous access. Findings: The right basilic vein was sonographically evaluated and determined to be patent. Concurrent realtime ultrasound was used to visualize needle entry into the right basilic vein and a permanent image  was stored for permanent recording and reporting.     Impression: Ultrasound guidance utilized for intravenous access as above. [id number] Electronically signed by:  Adam Jean MD  10/3/2021 12:36 PM CDT Workstation: [id number]    XR Chest 1 View    Result Date: 10/3/2021  CHEST X-RAY 1 VIEW on 10/3/2021 CLINICAL INDICATION: Sepsis, Covid 19, hypoxemia COMPARISON: 10/2/2021 FINDINGS: There is mild elevation of the right hemidiaphragm. There are mild developing bilateral interstitial opacities which based upon the patient's history is suggestive of mild developing Covid 19 pneumonia. Cardiac, hilar and mediastinal contours are within normal limits. No bony abnormality is noted.     Mild developing bilateral interstitial opacities suggesting developing Covid 19 pneumonia. Electronically signed by:  Kurt Cole  10/3/2021 5:50 AM CDT Workstation: [id number]    XR Chest 1 View    Result Date: 10/2/2021  EXAM:   XR Chest, 1 View CLINICAL HISTORY:   The patient is 31 years old and is Female; soa TECHNIQUE:   Frontal view of the chest. COMPARISON:   No relevant prior studies available. FINDINGS:   LUNGS:  Unremarkable.  No consolidation.   PLEURAL SPACE:  Unremarkable.  No pneumothorax.   HEART:  Unremarkable.  No cardiomegaly.   MEDIASTINUM:  Unremarkable.   BONES/JOINTS:  Unremarkable.   UPPER ABDOMEN:  Unremarkable as visualized.       No acute cardiopulmonary process. Electronically signed by:  Shanon Bethea MD  10/2/2021 2:11 AM CDT Workstation:  [id number]    IR Insert Midline Without Port Pump 5 Plus    Result Date: 10/3/2021  This procedure was auto-finalized with no dictation required.    CT Angiogram Chest    Result Date: 10/2/2021  EXAM:   CT Angiography Chest With Intravenous Contrast CLINICAL HISTORY:   The patient is 31 years old and is Female; soa TECHNIQUE:   Axial computed tomographic angiography images of the chest with intravenous contrast.  Sagittal and coronal reformatted images were created and reviewed.  This CT exam was performed using one or more of the following dose reduction techniques:  automated exposure control, adjustment of the mA and/or kV according to patient size, and/or use of iterative reconstruction technique.   MIP reconstructed images were created and reviewed. COMPARISON:   No relevant prior studies available. FINDINGS:   PULMONARY ARTERIES:  There are no obvious filling defects identified within the pulmonary arteries to suggest pulmonary embolism.   AORTA:  No acute findings.  No thoracic aortic aneurysm.   LUNGS:  Area of linear atelectasis in the lingula and right middle lobe are present. The lungs are otherwise clear. The tracheobronchial tree is widely patent.  No mass.   PLEURAL SPACE:  Unremarkable.  No significant effusion.  No pneumothorax.   HEART:  Unremarkable.  No cardiomegaly.  No significant pericardial effusion.  No evidence of RV dysfunction.   BONES/JOINTS:  No acute fracture.  No dislocation.   SOFT TISSUES:  Unremarkable.   LYMPH NODES:  Unremarkable.  No enlarged lymph nodes.   LIVER:  The liver is enlarged and diffusely fatty.     1.  No evidence of pulmonary embolism. 2.  Findings suggest areas of atelectasis within the right middle lobe and lingula. Electronically signed by:  Shanon Bethea MD  10/2/2021 4:00 AM CDT Workstation: [id number]      HPI/Hospital Course:  The patient is a 31 y.o. female with a history of DMII and obesity who presented to [facility] with fever, chills,  "cough, and right sided flank/chest pain. She was COVID-19 positive and UA was consistent with UTI.  CT of the abdomen revealed findings consistent with severe pyelonephritis.  Blood cultures grew E. Coli.  Antibiotics were de-escalated based on culture.  She is afebrile over 48 hours.  She is on room air.  She will complete a total of 10 days of antibiotics. She is stable and discharged to home.       Condition on Discharge:  Stable    Physical Exam on Discharge:  /80 (BP Location: Left arm, Patient Position: Lying)   Pulse 64   Temp 97.2 °F (36.2 °C) (Oral)   Resp 18   Ht 157.5 cm (62\")   Wt 105 kg (232 lb 6.4 oz)   LMP 09/18/2021   SpO2 95%   Breastfeeding No   BMI 42.51 kg/m²   Physical Exam  Vitals reviewed.   Constitutional:       General: She is not in acute distress.     Appearance: She is well-developed.  HENT:      Head: Normocephalic and atraumatic.   Eyes:      Conjunctiva/sclera: Conjunctivae normal.   Cardiovascular:      Rate and Rhythm: Normal rate and regular rhythm.   Pulmonary:      Effort: Pulmonary effort is normal. No respiratory distress.      Breath sounds: Normal breath sounds.   Abdominal:      General: Bowel sounds are normal. There is no distension.      Palpations: Abdomen is soft.   Musculoskeletal:         General: No swelling or deformity.   Skin:     General: Skin is warm and dry.   Neurological:      General: No focal deficit present.      Mental Status: She is alert and oriented to person, place, and time.     Discharge Disposition:  Home or Self Care    Discharge Medications:     Discharge Medications      New Medications      Instructions Start Date   cefdinir 300 MG capsule  Commonly known as: OMNICEF   300 mg, Oral, 2 Times Daily      HYDROcodone-acetaminophen 5-325 MG per tablet  Commonly known as: NORCO   Take 1 tablet by mouth Every 6 (Six) Hours As Needed for Moderate Pain for up to 4 days.         Continue These Medications      Instructions Start Date " "  albuterol sulfate  (90 Base) MCG/ACT inhaler  Commonly known as: Ventolin HFA   2 puffs, Inhalation, Every 4 Hours PRN      Alcohol Wipes 70 % pads   Use 4 x daily      B-D ULTRA-FINE 33 LANCETS misc   Use 4 x daily , any brand covered by insurance      BASAGLAR KWIKPEN 100 UNIT/ML injection pen   15 units at night      Blood Glucose Monitoring Suppl w/Device kit   USE AS INDICATED, ANY MONITOR , ICD10 code is E11.9      Blood Glucose Test strip   Use 4 x daily use any brand covered by insurance or same brand as before ICD10 code is E11.9      Ertugliflozin L-PyroglutamicAc 5 MG tablet  Commonly known as: Steglatro   5 mg, Oral, Every Morning      insulin lispro 100 UNIT/ML injection  Commonly known as: Admelog   100 Units, Subcutaneous, Daily      Insulin Syringe 31G X 5/16\" 0.5 ML misc   Use 4 x daily       Insulin Syringes (Disposable) U-100 0.5 ML misc   1 each, Does not apply, 3 Times Daily      Lancing Device misc   USE AS INDICATED TO CORRELATE WITH STRIPS AND METER      loratadine 10 MG tablet  Commonly known as: CLARITIN   10 mg, Oral, Daily      Ozempic (1 MG/DOSE) 2 MG/1.5ML solution pen-injector  Generic drug: Semaglutide (1 MG/DOSE)  Notes to patient: As directed   1 mg, Subcutaneous, Weekly      Pen Needles 32G X 4 MM misc   1 each, Does not apply, 4 Times Daily, Use 4 x daily, Dx code E11.65      propranolol 20 MG tablet  Commonly known as: INDERAL   20 mg, 3 Times Daily PRN      pseudoephedrine 30 MG tablet  Commonly known as: SUDAFED   30 mg, Oral, 2 Times Daily      sertraline 50 MG tablet  Commonly known as: ZOLOFT   50 mg, Oral, Daily      sertraline 100 MG tablet  Commonly known as: ZOLOFT   No dose, route, or frequency recorded.           Discharge Diet:   Diet Instructions     Diet: Regular, Consistent Carbohydrate; Thin      Discharge Diet:  Regular  Consistent Carbohydrate       Fluid Consistency: Thin          Activity at Discharge:   Activity Instructions     Activity as Tolerated "          Follow-up Appointments:   1. PCP 1 week    Test Results Pending at Discharge:   Pending Labs     Order Current Status    Blood Culture - Blood, Arm, Left Preliminary result          ALDAIR Wilkes  10/05/21  12:55 CDT    Time: Discharge planning and teaching took greater than 30 minutes.                 Electronically signed by Christian Man APRN at 10/05/21 1977

## 2021-10-06 NOTE — OUTREACH NOTE
Prep Survey      Responses   [facility] facility patient discharged from?  [place]   Is LACE score < 7 ?  No   Emergency Room discharge w/ pulse ox?  No   Eligibility  Readm Mgmt   Discharge diagnosis  Sepsis due to urinary tract infection,   COVID-19 virus infection   Does the patient have one of the following disease processes/diagnoses(primary or secondary)?  COVID-19   Does the patient have Home health ordered?  No   Is there a DME ordered?  No   Prep survey completed?  Yes          Lawanda Larsen RN

## 2021-10-06 NOTE — OUTREACH NOTE
COVID-19 Week 1 Survey      Responses   [facility] patient discharged from?  [place]   Does the patient have one of the following disease processes/diagnoses(primary or secondary)?  COVID-19   COVID-19 underlying condition?  Sepsis   Call Number  Call 1   Week 1 Call successful?  Yes   Call start time  1254   Call end time  1300   Discharge diagnosis  Sepsis due to urinary tract infection,   COVID-19 virus infection   Meds reviewed with patient/caregiver?  Yes   Is the patient having any side effects they believe may be caused by any medication additions or changes?  No   Does the patient have all medications ordered at discharge?  Yes   Is the patient taking all medications as directed (includes completed medication regime)?  Yes   Comments regarding appointments  10.11.2021 PCP scheduled   Does the patient have a primary care provider?   Yes   Does the patient have an appointment with their PCP or specialist within 7 days of discharge?  No   Nursing Interventions  Verified appointment date/time/provider   Has the patient kept scheduled appointments due by today?  N/A   What DME was ordered?  pulse ox   Has all DME been delivered?  Yes   Psychosocial issues?  No   Did the patient receive a copy of their discharge instructions?  Yes   Nursing interventions  Reviewed instructions with patient   What is the patient's perception of their health status since discharge?  Improving   Does the patient have any of the following symptoms?  Cough, Shortness of breath [fatigue]   Pulse Ox monitoring  Intermittent   Pulse Ox device source  Hospital   O2 Sat comments  99%RA   O2 Sat: education provided  Sat levels, Monitoring frequency, When to seek care   O2 Sat education comments  <90% seek emergency care, or call 911   Is the patient/caregiver able to teach back steps to recovery at home?  Set small, achievable goals for return to baseline health, Rest and rebuild strength, gradually increase activity, Eat a  well-balance diet, Practice good oral hygiene   If the patient is a current smoker, are they able to teach back resources for cessation?  Not a smoker   Is the patient/caregiver able to teach back the hierarchy of who to call/visit for symptoms/problems? PCP, Specialist, Home health nurse, Urgent Care, ED, 911  Yes   Is the patient/caregiver able to teach back Sepsis?  S - Shivering,fever or very cold, P - Pale or discolored skin, S - Short of breath   Nursing interventions  Nurse provided reassurance to patient   Is patient/caregiver able to teach back steps to recovery at home?  Set small, achievable goals for return to baseline health, Eat a balanced diet, Make a list of questions for PCP appoinment, Learn about sepsis(sepsis.org)   Is the patient/caregiver able to teach back signs and symptoms of worsening condition:  Fever, Hyperthermia, Edema, Altered mental status(confusion/coma)   COVID-19 call completed?  Yes   Wrap up additional comments  Patient states she is still fatigues with cough and SOA, monitoring sats, states she 'feels better then I did a few days ago', tolerating PO intake, resting well, follow-up scheduled. no concerns or questions at this time, NAD noted          Monica Cordon, RN

## 2021-10-07 ENCOUNTER — READMISSION MANAGEMENT (OUTPATIENT)
Dept: CALL CENTER | Facility: HOSPITAL | Age: 31
End: 2021-10-07

## 2021-10-07 LAB — BACTERIA SPEC AEROBE CULT: NORMAL

## 2021-10-07 NOTE — OUTREACH NOTE
COVID-19 Week 1 Survey      Responses   [facility] patient discharged from?  [place]   Does the patient have one of the following disease processes/diagnoses(primary or secondary)?  COVID-19   COVID-19 underlying condition?  Sepsis   Call Number  Call 2   Week 1 Call successful?  No          Suze Guerrero RN

## 2021-10-08 ENCOUNTER — READMISSION MANAGEMENT (OUTPATIENT)
Dept: CALL CENTER | Facility: HOSPITAL | Age: 31
End: 2021-10-08

## 2021-10-08 LAB — GLUCOSE BLDC GLUCOMTR-MCNC: 256 MG/DL (ref 70–130)

## 2021-10-08 NOTE — OUTREACH NOTE
COVID-19 Week 1 Survey      Responses   [facility], [place] patient discharged from?  [place]   Does the patient have one of the following disease processes/diagnoses(primary or secondary)?  COVID-19   COVID-19 underlying condition?  Sepsis   Call Number  Call 3   Week 1 Call successful?  Yes   Call start time  1115   Call end time  1120   Discharge diagnosis  Sepsis due to urinary tract infection,   COVID-19 virus infection   Meds reviewed with patient/caregiver?  Yes   Is the patient having any side effects they believe may be caused by any medication additions or changes?  No   Does the patient have all medications ordered at discharge?  Yes   Is the patient taking all medications as directed (includes completed medication regime)?  Yes   Comments regarding appointments  10.11.2021 PCP scheduled   Does the patient have a primary care provider?   Yes   Does the patient have an appointment with their PCP or specialist within 7 days of discharge?  No   Nursing Interventions  Verified appointment date/time/provider   Has the patient kept scheduled appointments due by today?  N/A   What DME was ordered?  pulse ox   Has all DME been delivered?  Yes   Psychosocial issues?  No   Did the patient receive a copy of their discharge instructions?  Yes   Nursing interventions  Reviewed instructions with patient   What is the patient's perception of their health status since discharge?  Improving   Does the patient have any of the following symptoms?  Cough [fatigue]   Nursing Interventions  Nurse provided patient education   Pulse Ox monitoring  Intermittent   Pulse Ox device source  Hospital   O2 Sat comments  99%RA   O2 Sat: education provided  Sat levels, Monitoring frequency, When to seek care   O2 Sat education comments  <90% seek emergency care, or call 911   Is the patient/caregiver able to teach back steps to recovery at home?  Set small, achievable goals for return to baseline health, Rest and rebuild strength,  gradually increase activity, Eat a well-balance diet   If the patient is a current smoker, are they able to teach back resources for cessation?  Not a smoker   Is the patient/caregiver able to teach back the hierarchy of who to call/visit for symptoms/problems? PCP, Specialist, Home health nurse, Urgent Care, ED, 911  Yes   Is the patient/caregiver able to teach back Sepsis?  S - Shivering,fever or very cold, P - Pale or discolored skin, S - Short of breath   Nursing interventions  Nurse provided reassurance to patient   Is patient/caregiver able to teach back steps to recovery at home?  Set small, achievable goals for return to baseline health, Eat a balanced diet, Make a list of questions for PCP appoinment, Learn about sepsis(sepsis.org)   Is the patient/caregiver able to teach back signs and symptoms of worsening condition:  Fever, Hyperthermia, Edema, Altered mental status(confusion/coma), Rapid heart rate (>90), Shortness of breath/rapid respiratory rate   COVID-19 call completed?  Yes   Wrap up additional comments  Patient states she is still fatigues with cough,  monitoring sats, states she 'feels better except just tired': tolerating PO intake, resting well, follow-up scheduled. no concerns or questions at this time, NAD noted          Monica Cordon RN

## 2021-10-12 ENCOUNTER — READMISSION MANAGEMENT (OUTPATIENT)
Dept: CALL CENTER | Facility: HOSPITAL | Age: 31
End: 2021-10-12

## 2021-10-12 NOTE — OUTREACH NOTE
COVID-19 Week 2 Survey      Responses   [facility] [place] patient discharged from? [place]   Does the patient have one of the following disease processes/diagnoses(primary or secondary)? COVID-19   COVID-19 underlying condition? Sepsis   Call Number Call 1   COVID-19 Week 2: Call 1 attempt successful? Yes   Call start time 1003   Call end time 1006   Discharge diagnosis Sepsis due to urinary tract infection,   COVID-19 virus infection   Meds reviewed with patient/caregiver? Yes   Is the patient having any side effects they believe may be caused by any medication additions or changes? No   Does the patient have all medications ordered at discharge? Yes   Is the patient taking all medications as directed (includes completed medication regime)? Yes   Does the patient have a primary care provider?  Yes   Does the patient have an appointment with their PCP or specialist within 7 days of discharge? Yes   Has the patient kept scheduled appointments due by today? Yes   What DME was ordered? pulse ox   Has all DME been delivered? Yes   Did the patient receive a copy of their discharge instructions? Yes   Did the patient receive a copy of COVID-19 specific instructions? Yes   Nursing interventions Reviewed instructions with patient   What is the patient's perception of their health status since discharge? Improving   Does the patient have any of the following symptoms? Cough   Nursing Interventions Nurse provided patient education   Pulse Ox monitoring Intermittent   Pulse Ox device source Hospital   O2 Sat comments 98%RA   O2 Sat: education provided Sat levels,  Monitoring frequency,  When to seek care   Is the patient/caregiver able to teach back steps to recovery at home? Set small, achievable goals for return to baseline health,  Rest and rebuild strength, gradually increase activity,  Eat a well-balance diet   If the patient is a current smoker, are they able to teach back resources for cessation? Not a smoker   Is  the patient/caregiver able to teach back Sepsis? S - Shivering,fever or very cold,  P - Pale or discolored skin,  S - Short of breath   Nursing interventions Nurse provided reassurance to patient   Is patient/caregiver able to teach back steps to recovery at home? Set small, achievable goals for return to baseline health,  Eat a balanced diet,  Make a list of questions for PCP appoinment,  Learn about sepsis(sepsis.org)   Is the patient/caregiver able to teach back signs and symptoms of worsening condition: Fever,  Hyperthermia,  Edema,  Altered mental status(confusion/coma),  Rapid heart rate (>90),  Shortness of breath/rapid respiratory rate   COVID-19 call completed? Yes   Wrap up additional comments Doing much better. No questions or concerns.           Dileep Lord RN

## 2022-06-13 NOTE — PROGRESS NOTES
Subjective   Veena Buck is a 28 y.o. female.     History of Present Illness  patient states less than 8 weeks pregnant and has not visited OB/GYN yet.  Currently on medicines as listed in the side bar.  We briefly  need for endocrinology consultation since she's pregnant.  She is to stop all medicines except metformin and Prozac for now.  Otherwise counseled be deferring to OB/GYN and endocrinology.    The following portions of the patient's history were reviewed and updated as appropriate: allergies, current medications, past family history, past medical history, past social history, past surgical history and problem list.    Review of Systems   Constitutional: Negative for activity change, appetite change, fatigue and unexpected weight change.   HENT: Negative for trouble swallowing and voice change.    Eyes: Negative for redness and visual disturbance.   Respiratory: Negative for cough and wheezing.    Cardiovascular: Negative for chest pain and palpitations.   Gastrointestinal: Negative for abdominal pain, constipation, diarrhea, nausea and vomiting.   Genitourinary: Negative for urgency.   Musculoskeletal: Negative for joint swelling.   Neurological: Negative for syncope and headaches.   Hematological: Negative for adenopathy.   Psychiatric/Behavioral: Negative for sleep disturbance.       Objective   Physical Exam   Constitutional: She appears well-developed.   HENT:   Head: Normocephalic.   Eyes: Pupils are equal, round, and reactive to light.   Neck: Normal range of motion.   Cardiovascular: Normal rate.   Psychiatric: She has a normal mood and affect. Her behavior is normal. Judgment and thought content normal.       Assessment/Plan   Veena was seen today for follow-up.    Diagnoses and all orders for this visit:    Type 2 diabetes mellitus without complication, without long-term current use of insulin (CMS/[facility])  -     Ambulatory Referral to Endocrinology    Panic anxiety syndrome    Less  than 8 weeks gestation of pregnancy  -     Ambulatory Referral to Endocrinology       Plan as above          present

## 2022-11-14 ENCOUNTER — TRANSCRIBE ORDERS (OUTPATIENT)
Dept: PODIATRY | Facility: CLINIC | Age: 32
End: 2022-11-14

## 2022-11-14 DIAGNOSIS — M79.672 BILATERAL FOOT PAIN: Primary | ICD-10-CM

## 2022-11-14 DIAGNOSIS — R60.9 EDEMA, UNSPECIFIED TYPE: ICD-10-CM

## 2022-11-14 DIAGNOSIS — M79.671 BILATERAL FOOT PAIN: Primary | ICD-10-CM

## 2022-11-28 ENCOUNTER — OFFICE VISIT (OUTPATIENT)
Dept: PODIATRY | Facility: CLINIC | Age: 32
End: 2022-11-28

## 2022-11-28 VITALS — HEIGHT: 63 IN | HEART RATE: 101 BPM | BODY MASS INDEX: 37.92 KG/M2 | OXYGEN SATURATION: 100 % | WEIGHT: 214 LBS

## 2022-11-28 DIAGNOSIS — M79.671 BILATERAL FOOT PAIN: ICD-10-CM

## 2022-11-28 DIAGNOSIS — E11.649 TYPE 2 DIABETES MELLITUS WITH HYPOGLYCEMIA WITHOUT COMA, WITH LONG-TERM CURRENT USE OF INSULIN: ICD-10-CM

## 2022-11-28 DIAGNOSIS — M79.672 BILATERAL FOOT PAIN: ICD-10-CM

## 2022-11-28 DIAGNOSIS — Z79.4 TYPE 2 DIABETES MELLITUS WITH HYPOGLYCEMIA WITHOUT COMA, WITH LONG-TERM CURRENT USE OF INSULIN: ICD-10-CM

## 2022-11-28 DIAGNOSIS — E11.42 DIABETIC POLYNEUROPATHY ASSOCIATED WITH TYPE 2 DIABETES MELLITUS: Primary | ICD-10-CM

## 2022-11-28 DIAGNOSIS — B35.1 ONYCHOMYCOSIS: ICD-10-CM

## 2022-11-28 DIAGNOSIS — B35.3 TINEA PEDIS OF BOTH FEET: ICD-10-CM

## 2022-11-28 PROCEDURE — 99213 OFFICE O/P EST LOW 20 MIN: CPT | Performed by: NURSE PRACTITIONER

## 2022-11-28 RX ORDER — PRENATAL VIT 91/IRON/FOLIC/DHA 28-975-200
1 COMBINATION PACKAGE (EA) ORAL NIGHTLY
Qty: 15 G | Refills: 1 | Status: SHIPPED | OUTPATIENT
Start: 2022-11-28

## 2022-11-28 NOTE — PROGRESS NOTES
Veena Buck  1990  32 y.o. female   PCP: Beverley Dunne 8/10/2022  BS: 350 per patient       2022    Chief Complaint   Patient presents with   • Left Foot - Pain   • Right Foot - Pain       History of Present Illness    Veena Buck is a 32 y.o.female came to clinic for bilateral foot pain. Xray obtained today.     32-year-old  female in the office today for evaluation of bilateral foot pain.  She reports that several years she has progressively experienced a worsening pain in both feet, its worse on the left than the right.  She has been evaluated by primary care provider several times who recently referred her to us for further evaluation of her symptoms.  She has been to our practice once before to see Dr. Field for an ankle sprain.  She currently denies any fever, chills or evidence of infection.  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  She is a diabetic however does not routinely check her blood sugars.  She has not had any  previous treatment for her foot pain but has been diagnosed with plantar fasciitis.  She reports that the pain is worse with standing is the same all of the time and does not improve no matter what position or activity she is in but only resolves while she is nonweightbearing.      Past Medical History:   Diagnosis Date   • Anxiety    • Asthma    • Diabetes mellitus (HCC)     Type 2   • Gestational diabetes     controlled with Insulin          Past Surgical History:   Procedure Laterality Date   • ANKLE SURGERY Left    • WISDOM TOOTH EXTRACTION           Family History   Problem Relation Age of Onset   • Cancer Mother    • Heart disease Mother    • Diabetes Maternal Grandmother        No Known Allergies    Social History     Socioeconomic History   • Marital status:    Tobacco Use   • Smoking status: Former     Packs/day: 1.00     Years: 12.00     Pack years: 12.00     Types: Cigarettes     Quit date: 2021     Years since quittin.1  "  • Smokeless tobacco: Never   Vaping Use   • Vaping Use: Former   • Substances: Nicotine, Flavoring   • Devices: Pre-filled or refillable cartridge, Refillable tank   Substance and Sexual Activity   • Alcohol use: No   • Drug use: No   • Sexual activity: Yes     Partners: Male     Birth control/protection: None         Current Outpatient Medications   Medication Sig Dispense Refill   • metFORMIN (GLUCOPHAGE) 500 MG tablet Take 500 mg by mouth Daily.     • propranolol (INDERAL) 20 MG tablet 20 mg 3 (Three) Times a Day As Needed (anxiety).     • albuterol sulfate HFA (Ventolin HFA) 108 (90 Base) MCG/ACT inhaler Inhale 2 puffs Every 4 (Four) Hours As Needed for Wheezing. 1 inhaler 0   • Alcohol Swabs (ALCOHOL WIPES) 70 % pads Use 4 x daily 120 each 11   • B-D ULTRA-FINE 33 LANCETS misc Use 4 x daily , any brand covered by insurance 120 each 11   • Blood Glucose Monitoring Suppl w/Device kit USE AS INDICATED, ANY MONITOR , ICD10 code is E11.9 1 each 1   • glipizide (GLUCOTROL) 10 MG tablet Take 10 mg by mouth Daily.     • Glucose Blood (BLOOD GLUCOSE TEST) strip Use 4 x daily use any brand covered by insurance or same brand as before ICD10 code is E11.9 120 each 11   • Insulin Pen Needle (PEN NEEDLES) 32G X 4 MM misc 1 each 4 (Four) Times a Day. Use 4 x daily, Dx code E11.65 120 each 11   • Insulin Syringe 31G X 5/16\" 0.5 ML misc Use 4 x daily 120 each 11   • Insulin Syringes, Disposable, U-100 0.5 ML misc 1 each 3 (Three) Times a Day. 100 each 11   • Lancet Devices (LANCING DEVICE) misc USE AS INDICATED TO CORRELATE WITH STRIPS AND METER 1 each 11   • loratadine (CLARITIN) 10 MG tablet Take 1 tablet by mouth Daily. 30 tablet 0   • Semaglutide, 1 MG/DOSE, (Ozempic, 1 MG/DOSE,) 2 MG/1.5ML solution pen-injector Inject 1 mg under the skin into the appropriate area as directed 1 (One) Time Per Week. 2 pen 11   • sertraline (ZOLOFT) 100 MG tablet      • sertraline (ZOLOFT) 50 MG tablet Take 1 tablet by mouth Daily. 90 " "tablet 2   • terbinafine (LamISIL AT) 1 % cream Apply 1 application topically to the appropriate area as directed Every Night. 15 g 1     No current facility-administered medications for this visit.       Review of Systems   HENT: Negative.    Eyes: Negative.    Respiratory: Negative.    Cardiovascular: Negative.    Gastrointestinal: Negative.    Endocrine: Negative.    Genitourinary: Negative.    Musculoskeletal:        Foot pain and ankle pain    Skin: Negative.    Allergic/Immunologic: Negative.    Neurological: Negative.    Hematological: Negative.    Psychiatric/Behavioral: Negative.          OBJECTIVE    Pulse 101   Ht 160 cm (63\")   Wt 97.1 kg (214 lb)   SpO2 100%   BMI 37.91 kg/m²     Physical Exam  Vitals reviewed.   Constitutional:       Appearance: Normal appearance. She is well-developed.   HENT:      Head: Normocephalic and atraumatic.   Neck:      Trachea: Trachea and phonation normal.   Cardiovascular:      Pulses:           Dorsalis pedis pulses are 2+ on the right side and 2+ on the left side.        Posterior tibial pulses are 2+ on the right side and 2+ on the left side.   Pulmonary:      Effort: Pulmonary effort is normal. No respiratory distress.   Abdominal:      General: There is no distension.      Palpations: Abdomen is soft.   Musculoskeletal:        Feet:    Feet:      Right foot:      Protective Sensation: 10 sites tested. 10 sites sensed.      Toenail Condition: Right toenails are abnormally thick and long. Fungal disease present.     Left foot:      Protective Sensation: 10 sites tested. 10 sites sensed.      Toenail Condition: Left toenails are abnormally thick and long. Fungal disease present.  Skin:     General: Skin is warm and dry.   Neurological:      Mental Status: She is alert and oriented to person, place, and time.      GCS: GCS eye subscore is 4. GCS verbal subscore is 5. GCS motor subscore is 6.   Psychiatric:         Speech: Speech normal.         Behavior: Behavior " normal. Behavior is cooperative.         Thought Content: Thought content normal.         Judgment: Judgment normal.     Radiology-reviewed x-rays today of both feet, there is retained surgical hardware in the ankle from previous fracture which remains in acceptable position and alignment on the left foot exam, right foot exam is normal with no musculoskeletal abnormality noted.    Obtain blood sugar reading, 350      Procedures        ASSESSMENT AND PLAN    Diagnoses and all orders for this visit:    1. Diabetic polyneuropathy associated with type 2 diabetes mellitus (HCC) (Primary)    2. Bilateral foot pain  -     XR foot 3+ vw bilateral; Future    3. Tinea pedis of both feet    4. Type 2 diabetes mellitus with hypoglycemia without coma, with long-term current use of insulin (HCC)    5. Onychomycosis    Other orders  -     terbinafine (LamISIL AT) 1 % cream; Apply 1 application topically to the appropriate area as directed Every Night.  Dispense: 15 g; Refill: 1        After long discussion with the patient and her mother I suspect that this is diabetic polyneuropathy.  The patient also has diffuse tinea pedis.  We will initiate a course of Lamisil treatment along with a compounded pain cream prescription will be sent in.  She will focus on lowering her blood glucose with the help of her primary care provider and follow-up in 1 month for recheck or sooner if symptoms worsen.  She was given information on diabetic foot care prior to her discharge and left ambulatory without difficulty.          This document has been electronically signed by Carlos QUINTEROS, FNP-C, ONP-C on November 28, 2022 11:45 CST

## 2022-12-29 PROCEDURE — 87480 CANDIDA DNA DIR PROBE: CPT | Performed by: NURSE PRACTITIONER

## 2022-12-29 PROCEDURE — 87510 GARDNER VAG DNA DIR PROBE: CPT | Performed by: NURSE PRACTITIONER

## 2022-12-29 PROCEDURE — 87660 TRICHOMONAS VAGIN DIR PROBE: CPT | Performed by: NURSE PRACTITIONER

## 2023-08-18 NOTE — CASE MANAGEMENT/SOCIAL WORK
Discharge Planning Assessment  [facility]     Patient Name: Veena Buck  MRN: [id number]  Today's Date: 10/5/2021    Admit Date: 10/2/2021    Discharge Needs Assessment     Row Name 10/05/21 1058       Living Environment    Lives With  child(padmini), dependent;spouse    Current Living Arrangements  home/apartment/condo    Primary Care Provided by  self    Provides Primary Care For  child(padmini)    Family Caregiver if Needed  spouse    Quality of Family Relationships  helpful;involved;supportive    Able to Return to Prior Arrangements  yes       Resource/Environmental Concerns    Resource/Environmental Concerns  none    Transportation Concerns  car, none       Transition Planning    Patient/Family Anticipates Transition to  home with family    Patient/Family Anticipated Services at Transition  none    Transportation Anticipated  family or friend will provide       Discharge Needs Assessment    Readmission Within the Last 30 Days  no previous admission in last 30 days    Equipment Currently Used at Home  glucometer    Concerns to be Addressed  no discharge needs identified    Anticipated Changes Related to Illness  none    Equipment Needed After Discharge  none    Current Discharge Risk  chronically ill        Discharge Plan     Row Name 10/05/21 1114       Plan    Plan Comments  LACE complete.  Pharmacy and coverage confirmed.  Patient has a glucometer at home.  No DME needed.  Patient was independent prior to hospitalization and reports a good support system.  Patient's mother will transport at discharge... MARY Jewell    Row Name 10/05/21 1056       Plan    Final Discharge Disposition Code  01 - home or self-care    Row Name 10/05/21 0942       Plan    Plan Comments  per team hudle with Christian QUINTEROS, plan d/c home on po antibiotics this afternoon. BC neg. x 3 days. afebrile. Bobbi Wood RN, CCM        Continued Care and Services - Admitted Since 10/2/2021    Coordination has not been started for this  encounter.       Expected Discharge Date and Time     Expected Discharge Date Expected Discharge Time    Oct 5, 2021 10:28 AM        Demographic Summary     Row Name 10/05/21 1111       General Information    Admission Type  inpatient    Arrived From  emergency department    Referral Source  high risk screening    Preferred Language  English     Used During This Interaction  no        Functional Status     Row Name 10/05/21 1057       Functional Status    Usual Activity Tolerance  good    Current Activity Tolerance  moderate       Functional Status, IADL    Medications  independent    Meal Preparation  independent    Housekeeping  independent    Laundry  independent    Shopping  independent       Mental Status Summary    Recent Changes in Mental Status/Cognitive Functioning  no changes       Employment/    Employment Status  unemployed        Psychosocial    No documentation.       Abuse/Neglect    No documentation.       Legal    No documentation.       Substance Abuse    No documentation.       Patient Forms    No documentation.           MARY Jewell     Yes

## 2023-08-29 ENCOUNTER — OFFICE VISIT (OUTPATIENT)
Dept: BEHAVIORAL HEALTH | Facility: CLINIC | Age: 33
End: 2023-08-29
Payer: COMMERCIAL

## 2023-08-29 DIAGNOSIS — F41.1 GENERALIZED ANXIETY DISORDER: Primary | ICD-10-CM

## 2023-08-29 PROCEDURE — 90791 PSYCH DIAGNOSTIC EVALUATION: CPT | Performed by: PSYCHOLOGIST
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
## 2023-10-16 ENCOUNTER — OFFICE VISIT (OUTPATIENT)
Dept: BEHAVIORAL HEALTH | Facility: CLINIC | Age: 33
End: 2023-10-16
Payer: COMMERCIAL

## 2023-10-16 DIAGNOSIS — F32.0 MAJOR DEPRESSIVE DISORDER, SINGLE EPISODE, MILD WITH ANXIOUS DISTRESS: Primary | ICD-10-CM
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
